# Patient Record
Sex: MALE | Race: BLACK OR AFRICAN AMERICAN | Employment: OTHER | URBAN - METROPOLITAN AREA
[De-identification: names, ages, dates, MRNs, and addresses within clinical notes are randomized per-mention and may not be internally consistent; named-entity substitution may affect disease eponyms.]

---

## 2017-01-20 NOTE — TELEPHONE ENCOUNTER
Requested Prescriptions     Pending Prescriptions Disp Refills    losartan (COZAAR) 100 mg tablet 30 Tab 5     Sig: Take 1 Tab by mouth daily.        Last office visit was  11/10/16  Next office visit is     2/9/17    Please assist.

## 2017-01-23 RX ORDER — LOSARTAN POTASSIUM 100 MG/1
100 TABLET ORAL DAILY
Qty: 30 TAB | Refills: 5 | Status: SHIPPED | OUTPATIENT
Start: 2017-01-23 | End: 2017-03-14 | Stop reason: SDUPTHER

## 2017-02-06 ENCOUNTER — HOSPITAL ENCOUNTER (OUTPATIENT)
Dept: LAB | Age: 72
Discharge: HOME | End: 2017-02-06
Payer: MEDICARE

## 2017-02-06 ENCOUNTER — LAB ONLY (OUTPATIENT)
Dept: INTERNAL MEDICINE CLINIC | Age: 72
End: 2017-02-06

## 2017-02-06 DIAGNOSIS — E11.65 TYPE 2 DIABETES MELLITUS WITH HYPERGLYCEMIA, WITHOUT LONG-TERM CURRENT USE OF INSULIN (HCC): ICD-10-CM

## 2017-02-06 LAB — HBA1C MFR BLD: 6.6 % (ref 4.2–5.6)

## 2017-02-06 PROCEDURE — 83036 HEMOGLOBIN GLYCOSYLATED A1C: CPT | Performed by: HOSPITALIST

## 2017-02-06 PROCEDURE — 36415 COLL VENOUS BLD VENIPUNCTURE: CPT | Performed by: HOSPITALIST

## 2017-02-09 ENCOUNTER — OFFICE VISIT (OUTPATIENT)
Dept: INTERNAL MEDICINE CLINIC | Age: 72
End: 2017-02-09

## 2017-02-09 DIAGNOSIS — I10 ESSENTIAL HYPERTENSION: Primary | ICD-10-CM

## 2017-02-09 NOTE — PATIENT INSTRUCTIONS
Hemoglobin A1c: About This Test  What is it? Hemoglobin A1c is a blood test that checks your average blood sugar level over the past 2 to 3 months. This test also is called a glycohemoglobin test or an A1c test.  Why is this test done? The A1c test is done to check how well your diabetes has been controlled over the past 2 to 3 months. Your doctor can use this information to adjust your medicine and diabetes treatment, if needed. How can you prepare for the test?  You do not need to stop eating before you have an A1c test. This test can be done at any time during the day, even after a meal.  What happens during the test?  The health professional taking a sample of your blood will:  · Wrap an elastic band around your upper arm. This makes the veins below the band larger so it is easier to put a needle into the vein. · Clean the needle site with alcohol. · Put the needle into the vein. · Attach a tube to the needle to fill it with blood. · Remove the band from your arm when enough blood is collected. · Put a gauze pad or cotton ball over the needle site as the needle is removed. · Put pressure on the site and then put on a bandage. What else should you know about the test?  The test result is usually given as a percentage. The normal A1c is less than 5.7%. The A1c test result also can be used to find your estimated average glucose, or eAG. Your eAG and A1c show the same thing in two different ways. They both help you learn more about your average blood sugar range over the past 2 to 3 months. Where can you learn more? Go to http://david-kayla.info/. Enter U216 in the search box to learn more about \"Hemoglobin A1c: About This Test.\"  Current as of: May 23, 2016  Content Version: 11.1  © 4259-6501 [employer]. Care instructions adapted under license by [employer] (which disclaims liability or warranty for this information).  If you have questions about a medical condition or this instruction, always ask your healthcare professional. [place] [address] any warranty or liability for your use of this information.

## 2017-03-08 NOTE — TELEPHONE ENCOUNTER
Requested Prescriptions     Pending Prescriptions Disp Refills    atorvastatin (LIPITOR) 80 mg tablet       Si Tab.

## 2017-03-08 NOTE — TELEPHONE ENCOUNTER
Requested Prescriptions     Pending Prescriptions Disp Refills    atorvastatin (LIPITOR) 80 mg tablet       Si Tab.        Last office visit was  17  Next office visit is     3/14/17    Please assist.

## 2017-03-09 RX ORDER — ATORVASTATIN CALCIUM 80 MG/1
80 TABLET, FILM COATED ORAL DAILY
Qty: 30 TAB | Refills: 4 | Status: SHIPPED | OUTPATIENT
Start: 2017-03-09 | End: 2017-10-10 | Stop reason: ALTCHOICE

## 2017-03-14 ENCOUNTER — OFFICE VISIT (OUTPATIENT)
Dept: INTERNAL MEDICINE CLINIC | Age: 72
End: 2017-03-14

## 2017-03-14 VITALS
BODY MASS INDEX: 32.2 KG/M2 | HEART RATE: 97 BPM | DIASTOLIC BLOOD PRESSURE: 68 MMHG | SYSTOLIC BLOOD PRESSURE: 142 MMHG | RESPIRATION RATE: 19 BRPM | HEIGHT: 64 IN | TEMPERATURE: 98.6 F | OXYGEN SATURATION: 98 % | WEIGHT: 188.6 LBS

## 2017-03-14 DIAGNOSIS — I10 ESSENTIAL HYPERTENSION: Primary | ICD-10-CM

## 2017-03-14 DIAGNOSIS — E11.65 TYPE 2 DIABETES MELLITUS WITH HYPERGLYCEMIA, WITHOUT LONG-TERM CURRENT USE OF INSULIN (HCC): ICD-10-CM

## 2017-03-14 DIAGNOSIS — E55.9 VITAMIN D DEFICIENCY: ICD-10-CM

## 2017-03-14 DIAGNOSIS — E78.00 PURE HYPERCHOLESTEROLEMIA: ICD-10-CM

## 2017-03-14 DIAGNOSIS — E66.9 OBESITY (BMI 30.0-34.9): ICD-10-CM

## 2017-03-14 RX ORDER — LOSARTAN POTASSIUM 100 MG/1
100 TABLET ORAL
Status: CANCELLED | OUTPATIENT
Start: 2017-03-14

## 2017-03-14 RX ORDER — CLOPIDOGREL BISULFATE 75 MG/1
75 TABLET ORAL DAILY
Qty: 30 TAB | Refills: 4 | Status: SHIPPED | OUTPATIENT
Start: 2017-03-14 | End: 2017-10-10 | Stop reason: ALTCHOICE

## 2017-03-14 RX ORDER — LOSARTAN POTASSIUM 100 MG/1
100 TABLET ORAL DAILY
Qty: 30 TAB | Refills: 5 | Status: SHIPPED | OUTPATIENT
Start: 2017-03-14 | End: 2017-08-12

## 2017-03-14 NOTE — PATIENT INSTRUCTIONS
A Healthy Lifestyle: Care Instructions  Your Care Instructions  A healthy lifestyle can help you feel good, stay at a healthy weight, and have plenty of energy for both work and play. A healthy lifestyle is something you can share with your whole family. A healthy lifestyle also can lower your risk for serious health problems, such as high blood pressure, heart disease, and diabetes. You can follow a few steps listed below to improve your health and the health of your family. Follow-up care is a key part of your treatment and safety. Be sure to make and go to all appointments, and call your doctor if you are having problems. Its also a good idea to know your test results and keep a list of the medicines you take. How can you care for yourself at home? · Do not eat too much sugar, fat, or fast foods. You can still have dessert and treats now and then. The goal is moderation. · Start small to improve your eating habits. Pay attention to portion sizes, drink less juice and soda pop, and eat more fruits and vegetables. ¨ Eat a healthy amount of food. A 3-ounce serving of meat, for example, is about the size of a deck of cards. Fill the rest of your plate with vegetables and whole grains. ¨ Limit the amount of soda and sports drinks you have every day. Drink more water when you are thirsty. ¨ Eat at least 5 servings of fruits and vegetables every day. It may seem like a lot, but it is not hard to reach this goal. A serving or helping is 1 piece of fruit, 1 cup of vegetables, or 2 cups of leafy, raw vegetables. Have an apple or some carrot sticks as an afternoon snack instead of a candy bar. Try to have fruits and/or vegetables at every meal.  · Make exercise part of your daily routine. You may want to start with simple activities, such as walking, bicycling, or slow swimming. Try to be active 30 to 60 minutes every day. You do not need to do all 30 to 60 minutes all at once.  For example, you can exercise 3 times a day for 10 or 20 minutes. Moderate exercise is safe for most people, but it is always a good idea to talk to your doctor before starting an exercise program.  · Keep moving. Su [employer] the lawn, work in the garden, or [employer]. Take the stairs instead of the elevator at work. · If you smoke, quit. People who smoke have an increased risk for heart attack, stroke, cancer, and other lung illnesses. Quitting is hard, but there are ways to boost your chance of quitting tobacco for good. ¨ Use nicotine gum, patches, or lozenges. ¨ Ask your doctor about stop-smoking programs and medicines. ¨ Keep trying. In addition to reducing your risk of diseases in the future, you will notice some benefits soon after you stop using tobacco. If you have shortness of breath or asthma symptoms, they will likely get better within a few weeks after you quit. · Limit how much alcohol you drink. Moderate amounts of alcohol (up to 2 drinks a day for men, 1 drink a day for women) are okay. But drinking too much can lead to liver problems, high blood pressure, and other health problems. Family health  If you have a family, there are many things you can do together to improve your health. · Eat meals together as a family as often as possible. · Eat healthy foods. This includes fruits, vegetables, lean meats and dairy, and whole grains. · Include your family in your fitness plan. Most people think of activities such as jogging or tennis as the way to fitness, but there are many ways you and your family can be more active. Anything that makes you breathe hard and gets your heart pumping is exercise. Here are some tips:  ¨ Walk to do errands or to take your child to school or the bus. ¨ Go for a family bike ride after dinner instead of watching TV. Where can you learn more? Go to http://david-kayla.info/. Enter V033 in the search box to learn more about \"A Healthy Lifestyle: Care Instructions. \"  Current as of: July 26, 2016  Content Version: 11.1  © 0228-5398 [employer]. Care instructions adapted under license by [employer] (which disclaims liability or warranty for this information). If you have questions about a medical condition or this instruction, always ask your healthcare professional. [address] any warranty or liability for your use of this information. DASH Diet: Care Instructions  Your Care Instructions  The DASH diet is an eating plan that can help lower your blood pressure. DASH stands for Dietary Approaches to Stop Hypertension. Hypertension is high blood pressure. The DASH diet focuses on eating foods that are high in calcium, potassium, and magnesium. These nutrients can lower blood pressure. The foods that are highest in these nutrients are fruits, vegetables, low-fat dairy products, nuts, seeds, and legumes. But taking calcium, potassium, and magnesium supplements instead of eating foods that are high in those nutrients does not have the same effect. The DASH diet also includes whole grains, fish, and poultry. The DASH diet is one of several lifestyle changes your doctor may recommend to lower your high blood pressure. Your doctor may also want you to decrease the amount of sodium in your diet. Lowering sodium while following the DASH diet can lower blood pressure even further than just the DASH diet alone. Follow-up care is a key part of your treatment and safety. Be sure to make and go to all appointments, and call your doctor if you are having problems. It's also a good idea to know your test results and keep a list of the medicines you take. How can you care for yourself at home? Following the DASH diet  · Eat 4 to 5 servings of fruit each day. A serving is 1 medium-sized piece of fruit, ½ cup chopped or canned fruit, 1/4 cup dried fruit, or 4 ounces (½ cup) of fruit juice. Choose fruit more often than fruit juice.   · Eat 4 to 5 servings of vegetables each day. A serving is 1 cup of lettuce or raw leafy vegetables, ½ cup of chopped or cooked vegetables, or 4 ounces (½ cup) of vegetable juice. Choose vegetables more often than vegetable juice. · Get 2 to 3 servings of low-fat and fat-free dairy each day. A serving is 8 ounces of milk, 1 cup of yogurt, or 1 ½ ounces of cheese. · Eat 6 to 8 servings of grains each day. A serving is 1 slice of bread, 1 ounce of dry cereal, or ½ cup of cooked rice, pasta, or cooked cereal. Try to choose whole-grain products as much as possible. · Limit lean meat, poultry, and fish to 2 servings each day. A serving is 3 ounces, about the size of a deck of cards. · Eat 4 to 5 servings of nuts, seeds, and legumes (cooked dried beans, lentils, and split peas) each week. A serving is 1/3 cup of nuts, 2 tablespoons of seeds, or ½ cup of cooked beans or peas. · Limit fats and oils to 2 to 3 servings each day. A serving is 1 teaspoon of vegetable oil or 2 tablespoons of salad dressing. · Limit sweets and added sugars to 5 servings or less a week. A serving is 1 tablespoon jelly or jam, ½ cup sorbet, or 1 cup of lemonade. · Eat less than 2,300 milligrams (mg) of sodium a day. If you limit your sodium to 1,500 mg a day, you can lower your blood pressure even more. Tips for success  · Start small. Do not try to make dramatic changes to your diet all at once. You might feel that you are missing out on your favorite foods and then be more likely to not follow the plan. Make small changes, and stick with them. Once those changes become habit, add a few more changes. · Try some of the following:  ¨ Make it a goal to eat a fruit or vegetable at every meal and at snacks. This will make it easy to get the recommended amount of fruits and vegetables each day. ¨ Try yogurt topped with fruit and nuts for a snack or healthy dessert. ¨ Add lettuce, tomato, cucumber, and onion to sandwiches.   ¨ Combine a ready-made pizza crust with low-fat mozzarella cheese and lots of vegetable toppings. Try using tomatoes, squash, spinach, broccoli, carrots, cauliflower, and onions. ¨ Have a variety of cut-up vegetables with a low-fat dip as an appetizer instead of chips and dip. ¨ Sprinkle sunflower seeds or chopped almonds over salads. Or try adding chopped walnuts or almonds to cooked vegetables. ¨ Try some vegetarian meals using beans and peas. Add garbanzo or kidney beans to salads. Make burritos and tacos with mashed barboza beans or black beans. Where can you learn more? Go to http://davidZi Uniform Supplykayla.info/. Enter E473 in the search box to learn more about \"DASH Diet: Care Instructions. \"  Current as of: March 23, 2016  Content Version: 11.1  © 7484-8416 [employer]. Care instructions adapted under license by [employer] (which disclaims liability or warranty for this information). If you have questions about a medical condition or this instruction, always ask your healthcare professional. [place] [address] any warranty or liability for your use of this information. Learning About Diabetes Food Guidelines  Your Care Instructions  Meal planning is important to manage diabetes. It helps keep your blood sugar at a target level (which you set with your doctor). You don't have to eat special foods. You can eat what your family eats, including sweets once in a while. But you do have to pay attention to how often you eat and how much you eat of certain foods. You may want to work with a dietitian or a certified diabetes educator (CDE) to help you plan meals and snacks. A dietitian or CDE can also help you lose weight if that is one of your goals. What should you know about eating carbs? Managing the amount of carbohydrate (carbs) you eat is an important part of healthy meals when you have diabetes. Carbohydrate is found in many foods. · Learn which foods have carbs.  And learn the amounts of carbs in different foods. ¨ Bread, cereal, pasta, and rice have about 15 grams of carbs in a serving. A serving is 1 slice of bread (1 ounce), ½ cup of cooked cereal, or 1/3 cup of cooked pasta or rice. ¨ Fruits have 15 grams of carbs in a serving. A serving is 1 small fresh fruit, such as an apple or orange; ½ of a banana; ½ cup of cooked or canned fruit; ½ cup of fruit juice; 1 cup of melon or raspberries; or 2 tablespoons of dried fruit. ¨ Milk and no-sugar-added yogurt have 15 grams of carbs in a serving. A serving is 1 cup of milk or 2/3 cup of no-sugar-added yogurt. ¨ Starchy vegetables have 15 grams of carbs in a serving. A serving is ½ cup of mashed potatoes or sweet potato; 1 cup winter squash; ½ of a small baked potato; ½ cup of cooked beans; or ½ cup cooked corn or green peas. · Learn how much carbs to eat each day and at each meal. A dietitian or CDE can teach you how to keep track of the amount of carbs you eat. This is called carbohydrate counting. · If you are not sure how to count carbohydrate grams, use the Plate Method to plan meals. It is a good, quick way to make sure that you have a balanced meal. It also helps you spread carbs throughout the day. ¨ Divide your plate by types of foods. Put non-starchy vegetables on half the plate, meat or other protein food on one-quarter of the plate, and a grain or starchy vegetable in the final quarter of the plate. To this you can add a small piece of fruit and 1 cup of milk or yogurt, depending on how many carbs you are supposed to eat at a meal.  · Try to eat about the same amount of carbs at each meal. Do not \"save up\" your daily allowance of carbs to eat at one meal.  · Proteins have very little or no carbs per serving. Examples of proteins are beef, chicken, turkey, fish, eggs, tofu, cheese, cottage cheese, and peanut butter. A serving size of meat is 3 ounces, which is about the size of a deck of cards.  Examples of meat substitute serving sizes (equal to 1 ounce of meat) are 1/4 cup of cottage cheese, 1 egg, 1 tablespoon of peanut butter, and ½ cup of tofu. How can you eat out and still eat healthy? · Learn to estimate the serving sizes of foods that have carbohydrate. If you measure food at home, it will be easier to estimate the amount in a serving of restaurant food. · If the meal you order has too much carbohydrate (such as potatoes, corn, or baked beans), ask to have a low-carbohydrate food instead. Ask for a salad or green vegetables. · If you use insulin, check your blood sugar before and after eating out to help you plan how much to eat in the future. · If you eat more carbohydrate at a meal than you had planned, take a walk or do other exercise. This will help lower your blood sugar. What else should you know? · Limit saturated fat, such as the fat from meat and dairy products. This is a healthy choice because people who have diabetes are at higher risk of heart disease. So choose lean cuts of meat and nonfat or low-fat dairy products. Use olive or canola oil instead of butter or shortening when cooking. · Don't skip meals. Your blood sugar may drop too low if you skip meals and take insulin or certain medicines for diabetes. · Check with your doctor before you drink alcohol. Alcohol can cause your blood sugar to drop too low. Alcohol can also cause a bad reaction if you take certain diabetes medicines. Follow-up care is a key part of your treatment and safety. Be sure to make and go to all appointments, and call your doctor if you are having problems. It's also a good idea to know your test results and keep a list of the medicines you take. Where can you learn more? Go to http://david-kayla.info/. Enter R438 in the search box to learn more about \"Learning About Diabetes Food Guidelines. \"  Current as of: May 23, 2016  Content Version: 11.1  © 3434-6026 [employer].  Care instructions adapted under license by [address] (which disclaims liability or warranty for this information). If you have questions about a medical condition or this instruction, always ask your healthcare professional. [address] any warranty or liability for your use of this information. Starting a Weight Loss Plan: Care Instructions  Your Care Instructions  If you are thinking about losing weight, it can be hard to know where to start. Your doctor can help you set up a weight loss plan that best meets your needs. You may want to take a class on nutrition or exercise, or join a weight loss support group. If you have questions about how to make changes to your eating or exercise habits, ask your doctor about seeing a registered dietitian or an exercise specialist.  It can be a big challenge to lose weight. But you do not have to make huge changes at once. Make small changes, and stick with them. When those changes become habit, add a few more changes. If you do not think you are ready to make changes right now, try to pick a date in the future. Make an appointment to see your doctor to discuss whether the time is right for you to start a plan. Follow-up care is a key part of your treatment and safety. Be sure to make and go to all appointments, and call your doctor if you are having problems. Its also a good idea to know your test results and keep a list of the medicines you take. How can you care for yourself at home? · Set realistic goals. Many people expect to lose much more weight than is likely. A weight loss of 5% to 10% of your body weight may be enough to improve your health. · Get family and friends involved to provide support. Talk to them about why you are trying to lose weight, and ask them to help. They can help by participating in exercise and having meals with you, even if they may be eating something different. · Find what works best for you.  If you do not have time or do not like to cook, a program that offers meal replacement bars or shakes may be better for you. Or if you like to prepare meals, finding a plan that includes daily menus and recipes may be best.  · Ask your doctor about other health professionals who can help you achieve your weight loss goals. ¨ A dietitian can help you make healthy changes in your diet. ¨ An exercise specialist or  can help you develop a safe and effective exercise program.  ¨ A counselor or psychiatrist can help you cope with issues such as depression, anxiety, or family problems that can make it hard to focus on weight loss. · Consider joining a support group for people who are trying to lose weight. Your doctor can suggest groups in your area. Where can you learn more? Go to http://david-kayla.info/. Enter T723 in the search box to learn more about \"Starting a Weight Loss Plan: Care Instructions. \"  Current as of: February 16, 2016  Content Version: 11.1  © 2510-4872 [employer]. Care instructions adapted under license by [employer] (which disclaims liability or warranty for this information). If you have questions about a medical condition or this instruction, always ask your healthcare professional. [address] any warranty or liability for your use of this information.

## 2017-03-14 NOTE — PROGRESS NOTES
Chief Complaint   Patient presents with    Hypertension    Diabetes    Medication Refill     1. Have you been to the ER, urgent care clinic since your last visit? Hospitalized since your last visit? No    2. Have you seen or consulted any other health care providers outside of the [address] [place] [address] since your last visit? Include any pap smears or colon screening.  No

## 2017-03-14 NOTE — MR AVS SNAPSHOT
Visit Information Date & Time Provider Department Dept. Phone Encounter #  
 3/14/2017  1:30 PM Flip Telles DO [facility] [employer] [phone] Follow-up Instructions Return in about 3 months (around 6/14/2017) for Labs 1 week before. Your Appointments 3/21/2017 10:00 AM  
PHYSICAL THERAPY with Bridget Roman PTA [facility]. [address]) Appt Note: MCR,  [employer]  
 301 [facility] [address] [facility] [address]  
  
   
 [place] [address] [id number]  
  
    
 3/29/2017  9:00 AM  
INJECTION with [facility] NURSE [facility]. [address] ([address]) Appt Note: 1st ici dosing/already paid $35.00 on 2/1/2017  
 301 [facility] [address] [facility] [address]  
  
   
 [place] [address] [id number]  
  
    
 4/6/2017  1:30 PM  
ESTABLISHED PATIENT with Rodolfo Madison MD  
[facility] [address]) Appt Note: F/U 3 mos with PSA. 301 [facility], [address] [facility] [address]  
[phone]  
  
   
 301 [facility], [address]  
  
    
 4/12/2017  2:15 PM  
ESTABLISHED PATIENT with Eli Villa MD  
[facility]. [address] ([address]) Appt Note: 3 mth follow up ED  
 [address] [address]  
[phone]  
  
   
 [facility] [phone] Upcoming Health Maintenance Date Due DTaP/Tdap/Td series (1 - Tdap) 4/23/1968 ZOSTER VACCINE AGE 60> 4/23/2007 GLAUCOMA SCREENING Q2Y 4/23/2012 MEDICARE YEARLY EXAM 4/23/2012 FOOT EXAM Q1 2/3/2015 EYE EXAM RETINAL OR DILATED Q1 3/3/2015 HEMOGLOBIN A1C Q6M 8/6/2017 MICROALBUMIN Q1 11/2/2017 LIPID PANEL Q1 11/2/2017 COLONOSCOPY 11/21/2021 Allergies as of 3/14/2017  Review Complete On: 3/14/2017 By: Flip Telles DO  
 No Known Allergies Current Immunizations  Reviewed on 7/12/2012 Name Date Pneumococcal Vaccine (Unspecified Type) 7/12/2012 Not reviewed this visit You Were Diagnosed With   
  
 Codes Comments Essential hypertension    -  Primary ICD-10-CM: I10 
ICD-9-CM: 401.9 Pure hypercholesterolemia     ICD-10-CM: E78.00 ICD-9-CM: 272.0 Type 2 diabetes mellitus with hyperglycemia, without long-term current use of insulin (HCC)     ICD-10-CM: E11.65 ICD-9-CM: 250.00, 790.29 Vitamin D deficiency     ICD-10-CM: E55.9 ICD-9-CM: 268.9 Obesity (BMI 30.0-34.9)     ICD-10-CM: R46.8 ICD-9-CM: 278.00 Vitals BP Pulse Temp Resp Height(growth percentile) Weight(growth percentile) 142/68 (BP 1 Location: Left arm, BP Patient Position: Sitting) 97 98.6 °F (37 °C) (Oral) 19 5' 4\" (1.626 m) 188 lb 9.6 oz (85.5 kg) SpO2 BMI Smoking Status 98% 32.37 kg/m2 Never Smoker Vitals History BMI and BSA Data Body Mass Index Body Surface Area  
 32.37 kg/m 2 1.96 m 2 Preferred Pharmacy Pharmacy Name Phone [facility] [address] [phone] Your Updated Medication List  
  
   
This list is accurate as of: 3/14/17  2:06 PM.  Always use your most recent med list.  
  
  
  
  
 AGGRENOX  mg per SR capsule Generic drug:  aspirin-dipyridamole Take 1 Cap by Mouth Twice Daily. atorvastatin 80 mg tablet Commonly known as:  LIPITOR Take 1 Tab by mouth daily. clopidogrel 75 mg Tab Commonly known as:  PLAVIX Take 1 Tab by mouth daily. FISH OIL 1,000 mg Cap Generic drug:  omega-3 fatty acids-vitamin e Take 1 Cap by mouth. hydroCHLOROthiazide 25 mg tablet Commonly known as:  HYDRODIURIL  
25 mg.  
  
 * losartan 100 mg tablet Commonly known as:  COZAAR  
100 mg.  
  
 * losartan 100 mg tablet Commonly known as:  COZAAR Take 1 Tab by mouth daily. * metFORMIN 500 mg tablet Commonly known as:  GLUCOPHAGE  
500 mg.  
  
 * metFORMIN 500 mg tablet Commonly known as:  GLUCOPHAGE Take 1 Tab by mouth two (2) times daily (with meals). oxybutynin 5 mg tablet Commonly known as:  DITROPAN  
5 mg.  
  
 tadalafil 5 mg tablet Commonly known as:  CIALIS Take 1 Tab by mouth daily. * FLOMAX 0.4 mg capsule Generic drug:  tamsulosin 0.4 mg.  
  
 * tamsulosin 0.4 mg capsule Commonly known as:  FLOMAX Take 2 Caps by mouth daily (after dinner). terazosin 2 mg capsule Commonly known as:  HYTRIN  
2 mg. VITAMIN D3 1,000 unit Cap Generic drug:  cholecalciferol Take  by mouth daily. * Notice: This list has 6 medication(s) that are the same as other medications prescribed for you. Read the directions carefully, and ask your doctor or other care provider to review them with you. Prescriptions Sent to Pharmacy Refills  
 clopidogrel (PLAVIX) 75 mg tab 4 Sig: Take 1 Tab by mouth daily. Class: Normal  
 Pharmacy: [place] [address] [address], [facility] [address] Ph #: [phone] Route: Oral  
 losartan (COZAAR) 100 mg tablet 5 Sig: Take 1 Tab by mouth daily. Class: Normal  
 Pharmacy: [place] [address] [address], [facility] [address] Ph #: [phone] Route: Oral  
  
Follow-up Instructions Return in about 3 months (around 6/14/2017) for Labs 1 week before. To-Do List   
 06/12/2017 Lab:  CBC WITH AUTOMATED DIFF   
  
 06/12/2017 Lab:  HEMOGLOBIN A1C W/O EAG   
  
 06/12/2017 Lab:  LIPID PANEL   
  
 06/12/2017 Lab:  METABOLIC PANEL, COMPREHENSIVE   
  
 06/12/2017 Lab:  T4, FREE   
  
 06/12/2017 Lab:  TSH 3RD GENERATION   
  
 06/12/2017 Lab:  VITAMIN D, 25 HYDROXY Patient Instructions A Healthy Lifestyle: Care Instructions Your Care Instructions A healthy lifestyle can help you feel good, stay at a healthy weight, and have plenty of energy for both work and play. A healthy lifestyle is something you can share with your whole family. A healthy lifestyle also can lower your risk for serious health problems, such as high blood pressure, heart disease, and diabetes. You can follow a few steps listed below to improve your health and the health of your family. Follow-up care is a key part of your treatment and safety. Be sure to make and go to all appointments, and call your doctor if you are having problems. Its also a good idea to know your test results and keep a list of the medicines you take. How can you care for yourself at home? · Do not eat too much sugar, fat, or fast foods. You can still have dessert and treats now and then. The goal is moderation. · Start small to improve your eating habits. Pay attention to portion sizes, drink less juice and soda pop, and eat more fruits and vegetables. ¨ Eat a healthy amount of food. A 3-ounce serving of meat, for example, is about the size of a deck of cards. Fill the rest of your plate with vegetables and whole grains. ¨ Limit the amount of soda and sports drinks you have every day. Drink more water when you are thirsty. ¨ Eat at least 5 servings of fruits and vegetables every day. It may seem like a lot, but it is not hard to reach this goal. A serving or helping is 1 piece of fruit, 1 cup of vegetables, or 2 cups of leafy, raw vegetables. Have an apple or some carrot sticks as an afternoon snack instead of a candy bar. Try to have fruits and/or vegetables at every meal. 
· Make exercise part of your daily routine. You may want to start with simple activities, such as walking, bicycling, or slow swimming. Try to be active 30 to 60 minutes every day. You do not need to do all 30 to 60 minutes all at once.  For example, you can exercise 3 times a day for 10 or 20 minutes. Moderate exercise is safe for most people, but it is always a good idea to talk to your doctor before starting an exercise program. 
· Keep moving. [place] Bun the lawn, work in the garden, or [employer]. Take the stairs instead of the elevator at work. · If you smoke, quit. People who smoke have an increased risk for heart attack, stroke, cancer, and other lung illnesses. Quitting is hard, but there are ways to boost your chance of quitting tobacco for good. ¨ Use nicotine gum, patches, or lozenges. ¨ Ask your doctor about stop-smoking programs and medicines. ¨ Keep trying. In addition to reducing your risk of diseases in the future, you will notice some benefits soon after you stop using tobacco. If you have shortness of breath or asthma symptoms, they will likely get better within a few weeks after you quit. · Limit how much alcohol you drink. Moderate amounts of alcohol (up to 2 drinks a day for men, 1 drink a day for women) are okay. But drinking too much can lead to liver problems, high blood pressure, and other health problems. Family health If you have a family, there are many things you can do together to improve your health. · Eat meals together as a family as often as possible. · Eat healthy foods. This includes fruits, vegetables, lean meats and dairy, and whole grains. · Include your family in your fitness plan. Most people think of activities such as jogging or tennis as the way to fitness, but there are many ways you and your family can be more active. Anything that makes you breathe hard and gets your heart pumping is exercise. Here are some tips: 
¨ Walk to do errands or to take your child to school or the bus. ¨ Go for a family bike ride after dinner instead of watching TV. Where can you learn more? Go to http://david-kayla.info/. Enter G688 in the search box to learn more about \"A Healthy Lifestyle: Care Instructions. \" Current as of: July 26, 2016 Content Version: 11.1 © 5907-3462 [employer]. Care instructions adapted under license by [employer] (which disclaims liability or warranty for this information). If you have questions about a medical condition or this instruction, always ask your healthcare professional. [address] any warranty or liability for your use of this information. DASH Diet: Care Instructions Your Care Instructions The DASH diet is an eating plan that can help lower your blood pressure. DASH stands for Dietary Approaches to Stop Hypertension. Hypertension is high blood pressure. The DASH diet focuses on eating foods that are high in calcium, potassium, and magnesium. These nutrients can lower blood pressure. The foods that are highest in these nutrients are fruits, vegetables, low-fat dairy products, nuts, seeds, and legumes. But taking calcium, potassium, and magnesium supplements instead of eating foods that are high in those nutrients does not have the same effect. The DASH diet also includes whole grains, fish, and poultry. The DASH diet is one of several lifestyle changes your doctor may recommend to lower your high blood pressure. Your doctor may also want you to decrease the amount of sodium in your diet. Lowering sodium while following the DASH diet can lower blood pressure even further than just the DASH diet alone. Follow-up care is a key part of your treatment and safety. Be sure to make and go to all appointments, and call your doctor if you are having problems. It's also a good idea to know your test results and keep a list of the medicines you take. How can you care for yourself at home? Following the DASH diet · Eat 4 to 5 servings of fruit each day. A serving is 1 medium-sized piece of fruit, ½ cup chopped or canned fruit, 1/4 cup dried fruit, or 4 ounces (½ cup) of fruit juice. Choose fruit more often than fruit juice. · Eat 4 to 5 servings of vegetables each day. A serving is 1 cup of lettuce or raw leafy vegetables, ½ cup of chopped or cooked vegetables, or 4 ounces (½ cup) of vegetable juice. Choose vegetables more often than vegetable juice. · Get 2 to 3 servings of low-fat and fat-free dairy each day. A serving is 8 ounces of milk, 1 cup of yogurt, or 1 ½ ounces of cheese. · Eat 6 to 8 servings of grains each day. A serving is 1 slice of bread, 1 ounce of dry cereal, or ½ cup of cooked rice, pasta, or cooked cereal. Try to choose whole-grain products as much as possible. · Limit lean meat, poultry, and fish to 2 servings each day. A serving is 3 ounces, about the size of a deck of cards. · Eat 4 to 5 servings of nuts, seeds, and legumes (cooked dried beans, lentils, and split peas) each week. A serving is 1/3 cup of nuts, 2 tablespoons of seeds, or ½ cup of cooked beans or peas. · Limit fats and oils to 2 to 3 servings each day. A serving is 1 teaspoon of vegetable oil or 2 tablespoons of salad dressing. · Limit sweets and added sugars to 5 servings or less a week. A serving is 1 tablespoon jelly or jam, ½ cup sorbet, or 1 cup of lemonade. · Eat less than 2,300 milligrams (mg) of sodium a day. If you limit your sodium to 1,500 mg a day, you can lower your blood pressure even more. Tips for success · Start small. Do not try to make dramatic changes to your diet all at once. You might feel that you are missing out on your favorite foods and then be more likely to not follow the plan. Make small changes, and stick with them. Once those changes become habit, add a few more changes. · Try some of the following: ¨ Make it a goal to eat a fruit or vegetable at every meal and at snacks. This will make it easy to get the recommended amount of fruits and vegetables each day. ¨ Try yogurt topped with fruit and nuts for a snack or healthy dessert. ¨ Add lettuce, tomato, cucumber, and onion to sandwiches. ¨ Combine a ready-made pizza crust with low-fat mozzarella cheese and lots of vegetable toppings. Try using tomatoes, squash, spinach, broccoli, carrots, cauliflower, and onions. ¨ Have a variety of cut-up vegetables with a low-fat dip as an appetizer instead of chips and dip. ¨ Sprinkle sunflower seeds or chopped almonds over salads. Or try adding chopped walnuts or almonds to cooked vegetables. ¨ Try some vegetarian meals using beans and peas. Add garbanzo or kidney beans to salads. Make burritos and tacos with mashed barboza beans or black beans. Where can you learn more? Go to http://davidEnjoikayla.info/. Enter M724 in the search box to learn more about \"DASH Diet: Care Instructions. \" Current as of: March 23, 2016 Content Version: 11.1 © 3545-2783 [employer]entials. Care instructions adapted under license by [employer] (which disclaims liability or warranty for this information). If you have questions about a medical condition or this instruction, always ask your healthcare professional. [address] any warranty or liability for your use of this information. Learning About Diabetes Food Guidelines Your Care Instructions Meal planning is important to manage diabetes. It helps keep your blood sugar at a target level (which you set with your doctor). You don't have to eat special foods. You can eat what your family eats, including sweets once in a while. But you do have to pay attention to how often you eat and how much you eat of certain foods. You may want to work with a dietitian or a certified diabetes educator (CDE) to help you plan meals and snacks. A dietitian or CDE can also help you lose weight if that is one of your goals. What should you know about eating carbs? Managing the amount of carbohydrate (carbs) you eat is an important part of healthy meals when you have diabetes. Carbohydrate is found in many foods. · Learn which foods have carbs. And learn the amounts of carbs in different foods. ¨ Bread, cereal, pasta, and rice have about 15 grams of carbs in a serving. A serving is 1 slice of bread (1 ounce), ½ cup of cooked cereal, or 1/3 cup of cooked pasta or rice. ¨ Fruits have 15 grams of carbs in a serving. A serving is 1 small fresh fruit, such as an apple or orange; ½ of a banana; ½ cup of cooked or canned fruit; ½ cup of fruit juice; 1 cup of melon or raspberries; or 2 tablespoons of dried fruit. ¨ Milk and no-sugar-added yogurt have 15 grams of carbs in a serving. A serving is 1 cup of milk or 2/3 cup of no-sugar-added yogurt. ¨ Starchy vegetables have 15 grams of carbs in a serving. A serving is ½ cup of mashed potatoes or sweet potato; 1 cup winter squash; ½ of a small baked potato; ½ cup of cooked beans; or ½ cup cooked corn or green peas. · Learn how much carbs to eat each day and at each meal. A dietitian or CDE can teach you how to keep track of the amount of carbs you eat. This is called carbohydrate counting. · If you are not sure how to count carbohydrate grams, use the Plate Method to plan meals. It is a good, quick way to make sure that you have a balanced meal. It also helps you spread carbs throughout the day. ¨ Divide your plate by types of foods. Put non-starchy vegetables on half the plate, meat or other protein food on one-quarter of the plate, and a grain or starchy vegetable in the final quarter of the plate. To this you can add a small piece of fruit and 1 cup of milk or yogurt, depending on how many carbs you are supposed to eat at a meal. 
· Try to eat about the same amount of carbs at each meal. Do not \"save up\" your daily allowance of carbs to eat at one meal. 
· Proteins have very little or no carbs per serving. Examples of proteins are beef, chicken, turkey, fish, eggs, tofu, cheese, cottage cheese, and peanut butter.  A serving size of meat is 3 ounces, which is about the size of a deck of cards. Examples of meat substitute serving sizes (equal to 1 ounce of meat) are 1/4 cup of cottage cheese, 1 egg, 1 tablespoon of peanut butter, and ½ cup of tofu. How can you eat out and still eat healthy? · Learn to estimate the serving sizes of foods that have carbohydrate. If you measure food at home, it will be easier to estimate the amount in a serving of restaurant food. · If the meal you order has too much carbohydrate (such as potatoes, corn, or baked beans), ask to have a low-carbohydrate food instead. Ask for a salad or green vegetables. · If you use insulin, check your blood sugar before and after eating out to help you plan how much to eat in the future. · If you eat more carbohydrate at a meal than you had planned, take a walk or do other exercise. This will help lower your blood sugar. What else should you know? · Limit saturated fat, such as the fat from meat and dairy products. This is a healthy choice because people who have diabetes are at higher risk of heart disease. So choose lean cuts of meat and nonfat or low-fat dairy products. Use olive or canola oil instead of butter or shortening when cooking. · Don't skip meals. Your blood sugar may drop too low if you skip meals and take insulin or certain medicines for diabetes. · Check with your doctor before you drink alcohol. Alcohol can cause your blood sugar to drop too low. Alcohol can also cause a bad reaction if you take certain diabetes medicines. Follow-up care is a key part of your treatment and safety. Be sure to make and go to all appointments, and call your doctor if you are having problems. It's also a good idea to know your test results and keep a list of the medicines you take. Where can you learn more? Go to http://david-kayla.info/. Enter N779 in the search box to learn more about \"Learning About Diabetes Food Guidelines. \" Current as of: May 23, 2016 Content Version: 11.1 © 7698-2093 Healthwise, Incorporated. Care instructions adapted under license by [employer] (which disclaims liability or warranty for this information). If you have questions about a medical condition or this instruction, always ask your healthcare professional. [address] any warranty or liability for your use of this information. Starting a Weight Loss Plan: Care Instructions Your Care Instructions If you are thinking about losing weight, it can be hard to know where to start. Your doctor can help you set up a weight loss plan that best meets your needs. You may want to take a class on nutrition or exercise, or join a weight loss support group. If you have questions about how to make changes to your eating or exercise habits, ask your doctor about seeing a registered dietitian or an exercise specialist. 
It can be a big challenge to lose weight. But you do not have to make huge changes at once. Make small changes, and stick with them. When those changes become habit, add a few more changes. If you do not think you are ready to make changes right now, try to pick a date in the future. Make an appointment to see your doctor to discuss whether the time is right for you to start a plan. Follow-up care is a key part of your treatment and safety. Be sure to make and go to all appointments, and call your doctor if you are having problems. Its also a good idea to know your test results and keep a list of the medicines you take. How can you care for yourself at home? · Set realistic goals. Many people expect to lose much more weight than is likely. A weight loss of 5% to 10% of your body weight may be enough to improve your health. · Get family and friends involved to provide support. Talk to them about why you are trying to lose weight, and ask them to help.  They can help by participating in exercise and having meals with you, even if they may be eating something different. · Find what works best for you. If you do not have time or do not like to cook, a program that offers meal replacement bars or shakes may be better for you. Or if you like to prepare meals, finding a plan that includes daily menus and recipes may be best. 
· Ask your doctor about other health professionals who can help you achieve your weight loss goals. ¨ A dietitian can help you make healthy changes in your diet. ¨ An exercise specialist or  can help you develop a safe and effective exercise program. 
¨ A counselor or psychiatrist can help you cope with issues such as depression, anxiety, or family problems that can make it hard to focus on weight loss. · Consider joining a support group for people who are trying to lose weight. Your doctor can suggest groups in your area. Where can you learn more? Go to http://david-kayla.info/. Enter Y181 in the search box to learn more about \"Starting a Weight Loss Plan: Care Instructions. \" Current as of: February 16, 2016 Content Version: 11.1 © 3903-0293 [employer]. Care instructions adapted under license by [employer] (which disclaims liability or warranty for this information). If you have questions about a medical condition or this instruction, always ask your healthcare professional. [address] any warranty or liability for your use of this information. Introducing [facility]! Yelena Kenney introduces LectureTools patient portal. Now you can access parts of your medical record, email your doctor's office, and request medication refills online. 1. In your internet browser, go to [email or web address] 2. Click on the First Time User? Click Here link in the Sign In box. You will see the New Member Sign Up page. 3. Enter your LectureTools Access Code exactly as it appears below.  You will not need to use this code after youve completed the sign-up process. If you do not sign up before the expiration date, you must request a new code. · OkCupid Access Code: [email or web address] Expires: 5/2/2017 10:24 AM 
 
4. Enter the last four digits of your Social Security Number (xxxx) and Date of Birth (mm/dd/yyyy) as indicated and click Submit. You will be taken to the next sign-up page. 5. Create a OkCupid ID. This will be your OkCupid login ID and cannot be changed, so think of one that is secure and easy to remember. 6. Create a OkCupid password. You can change your password at any time. 7. Enter your Password Reset Question and Answer. This can be used at a later time if you forget your password. 8. Enter your e-mail address. You will receive e-mail notification when new information is available in [address]. 9. Click Sign Up. You can now view and download portions of your medical record. 10. Click the Download Summary menu link to download a portable copy of your medical information. If you have questions, please visit the Frequently Asked Questions section of the OkCupid website. Remember, OkCupid is NOT to be used for urgent needs. For medical emergencies, dial 911. Now available from your iPhone and Android! Please provide this summary of care documentation to your next provider. Your primary care clinician is listed as Zoraida [place]. If you have any questions after today's visit, please call [phone].

## 2017-03-14 NOTE — PROGRESS NOTES
Chief Complaint   Patient presents with    Hypertension    Diabetes    Medication Refill       HPI:  Patient is a 73year old obese  male with medical problems listed below presents today for follow up of Hypertension, DM 2, Hyperlipidemia, Vit D def, etc. He has been feeling well and voices no complaints today. He is complaint with his medications with no adverse effects reported and his weight has been stable. Past Medical History:   Diagnosis Date    BPH (benign prostatic hypertrophy) with urinary obstruction     CAD (coronary artery disease) 6-21-02 8-5-06 Thallium ST: +inferolat ischemic    CVA 3-17-06    acute CVA, L Eual w/ mild R sided weakness    Diabetes ([facility] [address] [facility]     [facility] 7/12/2012    ED (erectile dysfunction) 12-22-08    Elevated PSA 11/21/2011    Erectile dysfunction due to arterial insufficiency     Erectile dysfunction of organic origin     Essential hypertension     Heartburn 12-4-03    Hemorrhoids     Hypercholesterolemia 7-15-08    TSH 0.88    Hyperglycemia     Hyperlipidemia     Hypertension 6-22-01    MILD    Obstructive sleep apnea (adult) (pediatric) 2-2009    Prediabetes 8/18/2010    Prostate cancer ([facility] 03.25.2016    Darvin 7 in 1/12 cores and Darvin 6 in 2/12 cores. PSA 6.0ng/mL. TRUS Volume 143 grams    Psoriasis     Rising PSA level     Rotator cuff tear, right     [facility] Shen [facility]    Seborrheic dermatitis     Shoulder pain, right 1/27/2012    Stroke [facility])     Vitamin D deficiency 11/25/2011     No Known Allergies    Current Outpatient Prescriptions   Medication Sig Dispense Refill    atorvastatin (LIPITOR) 80 mg tablet Take 1 Tab by mouth daily. 30 Tab 4    losartan (COZAAR) 100 mg tablet Take 1 Tab by mouth daily. 30 Tab 5    aspirin-dipyridamole (AGGRENOX)  mg per SR capsule Take 1 Cap by Mouth Twice Daily.  oxybutynin (DITROPAN) 5 mg tablet 5 mg.       hydroCHLOROthiazide (HYDRODIURIL) 25 mg tablet 25 mg.      metFORMIN (GLUCOPHAGE) 500 mg tablet 500 mg.      losartan (COZAAR) 100 mg tablet 100 mg.  clopidogrel (PLAVIX) 75 mg tab 75 mg.  tamsulosin (FLOMAX) 0.4 mg capsule 0.4 mg.      terazosin (HYTRIN) 2 mg capsule 2 mg.  tadalafil (CIALIS) 5 mg tablet Take 1 Tab by mouth daily. 30 Tab 3    cholecalciferol (VITAMIN D3) 1,000 unit cap Take  by mouth daily.  tamsulosin (FLOMAX) 0.4 mg capsule Take 2 Caps by mouth daily (after dinner). 180 Cap 3    metFORMIN (GLUCOPHAGE) 500 mg tablet Take 1 Tab by mouth two (2) times daily (with meals). 60 Tab 4    omega-3 fatty acids-vitamin e (FISH OIL) 1,000 mg cap Take 1 Cap by mouth. Past Surgical History:   Procedure Laterality Date    HX KNEE ARTHROSCOPY      HX PROSTATECTOMY  11/16/2016    Robotic-assisted laparoscopic radical prostatectomy. Robotic-assisted laparoscopic bilateral pelvic lymph node dissection  .Dissection was very difficult, bladder neck repair/anastamosis complex and time for this was >50% of time ordinarily required for this procedure.     NE COLONOSCOPY FLX DX W/COLLJ SPEC WHEN PFRMD  4-30-01    papules/ Christiano    NE COLONOSCOPY FLX DX W/COLLJ SPEC WHEN PFRMD      normal . Dr. Beck       ROS:  Constitutional: Negative for fever, chills, or fatigue  Neurological: Negative for headache, dizziness, visual disturbance, or loss of conciousness  Respiratory: Negative for SOB, hemoptysis, or wheezing  Cardiovascular: Negative for chest pain, palpitation, or leg swelling  Gastrointestinal: Negative for abdominal pain, nausea, vomiting, diarrhea, blood in stool, melena, or heartburn  Musculoskeletal: Negative for falls      Physical Exam:  Visit Vitals    /68 (BP 1 Location: Left arm, BP Patient Position: Sitting)    Pulse 97    Temp 98.6 °F (37 °C) (Oral)    Resp 19    Ht 5' 4\" (1.626 m)    Wt 188 lb 9.6 oz (85.5 kg)    SpO2 98%    BMI 32.37 kg/m2 General: Obese black male, a & o x 3, afebrile, well-nourished, interacting appropriately, in no acute distress  Skin: warm and dry, no rashes , no bruises  Neck: supple, symmetrical, no thyromegaly  Respiratory: symmetrical chest expansion, lung sounds clear bilaterally, good air entry, good respiratory effort, no wheezes or crackles  Cardiovascular: normal S1S2, regular rate and rhythm, no murmurs, pulses palpable, no thrill, no carotid or abdominal bruits, no peripheral edema, no JVD  Abdomen: non-distended, normoactive bowel sounds x 4 quadrants, soft, non-tender to palpation  Musculoskeletal: normal ROM on all joints, no swelling or deformity, no perilumbar tenderness, steady gait  Diabetic foot exam: pedal pulses palpable and no callus noted          Assessment/Plan:    ICD-10-CM ICD-9-CM    1. Essential hypertension I10 401.9 Controlled  Continue current meds and he was counseled on low salt diet. losartan (COZAAR) 100 mg tablet      CBC WITH AUTOMATED DIFF      METABOLIC PANEL, COMPREHENSIVE      T4, FREE      TSH 3RD GENERATION   2. Pure hypercholesterolemia E78.00 272.0 Continue Lipitor 80 mg daily and he was counseled on low fat diet - will check lipid panel at f/u in 3 months. LIPID PANEL   3. Type 2 diabetes mellitus with hyperglycemia, without long-term current use of insulin (HCC) E11.65 250.00 Controlled with recent HBA1C at 6.6. Continue current meds and he was counseled on diabetic diet. HEMOGLOBIN A1C W/O EAG     790.29    4. Vitamin D deficiency E55.9 268.9 VITAMIN D, 25 HYDROXY   5. Obesity (BMI 30.0-34. 9) E66.9 278.00 I have reviewed/discussed the above normal BMI with the patient. I have recommended the following interventions: dietary management education, guidance, and counseling, encourage exercise and lifestyle education regarding diet . The plan is as follows: I have counseled this patient on diet and exercise regimens.  .           Orders Placed This Encounter    CBC WITH AUTOMATED DIFF     Standing Status:   Future     Standing Expiration Date:   9/10/2017    HEMOGLOBIN A1C W/O EAG     Standing Status:   Future     Standing Expiration Date:   3/15/2018    LIPID PANEL     Standing Status:   Future     Standing Expiration Date:   1/08/9685    METABOLIC PANEL, COMPREHENSIVE     Standing Status:   Future     Standing Expiration Date:   9/10/2017    T4, FREE     Standing Status:   Future     Standing Expiration Date:   9/10/2017    TSH 3RD GENERATION     Standing Status:   Future     Standing Expiration Date:   7/12/2017    VITAMIN D, 25 HYDROXY     Standing Status:   Future     Standing Expiration Date:   7/14/2017    clopidogrel (PLAVIX) 75 mg tab     Sig: Take 1 Tab by mouth daily. Dispense:  30 Tab     Refill:  4    losartan (COZAAR) 100 mg tablet     Sig: Take 1 Tab by mouth daily. Dispense:  30 Tab     Refill:  5         Recent labs reviewed with pt        Additional Notes: Discussed today's diagnosis, treatment plans. Discussed medication indications and side effects. Weight Management: Counseled  After Visit Summary: Discussed provided printed patient instructions. Answered questions accordingly. Follow-up Disposition:  In 3 months with labs 1 week prior        Kristin Pa DO, MPH  Internal Medicine

## 2017-04-06 PROBLEM — N52.9 IMPOTENCE OF ORGANIC ORIGIN: Status: ACTIVE | Noted: 2017-04-06

## 2017-04-06 PROBLEM — N39.3 SUI (STRESS URINARY INCONTINENCE), MALE: Status: ACTIVE | Noted: 2017-04-06

## 2017-06-01 RX ORDER — HYDROCHLOROTHIAZIDE 25 MG/1
25 TABLET ORAL DAILY
Qty: 30 TAB | Refills: 3 | Status: SHIPPED | OUTPATIENT
Start: 2017-06-01 | End: 2017-08-12

## 2017-06-01 NOTE — TELEPHONE ENCOUNTER
I called Pt in regards to Rx refill for HCTZ. Informed Pt that Rx was refilled and sent to the pharmacy.

## 2017-06-01 NOTE — TELEPHONE ENCOUNTER
Requested Prescriptions     Pending Prescriptions Disp Refills    hydroCHLOROthiazide (HYDRODIURIL) 25 mg tablet       Si Tab.        Last office visit was  3/14/17  Next office visit is     17    Please assist.

## 2017-06-01 NOTE — TELEPHONE ENCOUNTER
Pt called in requesting refill of his   Requested Prescriptions     Pending Prescriptions Disp Refills    hydroCHLOROthiazide (HYDRODIURIL) 25 mg tablet       Si Tab. Jonas Ricks

## 2017-06-12 ENCOUNTER — OFFICE VISIT (OUTPATIENT)
Dept: INTERNAL MEDICINE CLINIC | Age: 72
End: 2017-06-12

## 2017-06-12 ENCOUNTER — HOSPITAL ENCOUNTER (OUTPATIENT)
Dept: LAB | Age: 72
Discharge: HOME | End: 2017-06-12
Payer: MEDICARE

## 2017-06-12 VITALS
BODY MASS INDEX: 32.33 KG/M2 | OXYGEN SATURATION: 98 % | SYSTOLIC BLOOD PRESSURE: 140 MMHG | RESPIRATION RATE: 16 BRPM | TEMPERATURE: 98.1 F | HEIGHT: 64 IN | HEART RATE: 69 BPM | DIASTOLIC BLOOD PRESSURE: 78 MMHG | WEIGHT: 189.4 LBS

## 2017-06-12 DIAGNOSIS — I10 ESSENTIAL HYPERTENSION: ICD-10-CM

## 2017-06-12 DIAGNOSIS — E66.9 OBESITY (BMI 30.0-34.9): ICD-10-CM

## 2017-06-12 DIAGNOSIS — E78.00 PURE HYPERCHOLESTEROLEMIA: ICD-10-CM

## 2017-06-12 DIAGNOSIS — E55.9 VITAMIN D DEFICIENCY: ICD-10-CM

## 2017-06-12 DIAGNOSIS — E11.65 TYPE 2 DIABETES MELLITUS WITH HYPERGLYCEMIA, WITHOUT LONG-TERM CURRENT USE OF INSULIN (HCC): ICD-10-CM

## 2017-06-12 DIAGNOSIS — E11.65 TYPE 2 DIABETES MELLITUS WITH HYPERGLYCEMIA, WITHOUT LONG-TERM CURRENT USE OF INSULIN (HCC): Primary | ICD-10-CM

## 2017-06-12 LAB
ALBUMIN SERPL BCP-MCNC: 3.2 G/DL (ref 3.4–5)
ALBUMIN/GLOB SERPL: 0.9 {RATIO} (ref 0.8–1.7)
ALP SERPL-CCNC: 82 U/L (ref 45–117)
ALT SERPL-CCNC: 35 U/L (ref 16–61)
ANION GAP BLD CALC-SCNC: 7 MMOL/L (ref 3–18)
AST SERPL W P-5'-P-CCNC: 17 U/L (ref 15–37)
BASOPHILS # BLD AUTO: 0 K/UL (ref 0–0.06)
BASOPHILS # BLD: 0 % (ref 0–2)
BILIRUB SERPL-MCNC: 0.4 MG/DL (ref 0.2–1)
BUN SERPL-MCNC: 15 MG/DL (ref 7–18)
BUN/CREAT SERPL: 12 (ref 12–20)
CALCIUM SERPL-MCNC: 8.9 MG/DL (ref 8.5–10.1)
CHLORIDE SERPL-SCNC: 108 MMOL/L (ref 100–108)
CHOLEST SERPL-MCNC: 211 MG/DL
CO2 SERPL-SCNC: 26 MMOL/L (ref 21–32)
CREAT SERPL-MCNC: 1.22 MG/DL (ref 0.6–1.3)
DIFFERENTIAL METHOD BLD: ABNORMAL
EOSINOPHIL # BLD: 0.1 K/UL (ref 0–0.4)
EOSINOPHIL NFR BLD: 2 % (ref 0–5)
ERYTHROCYTE [DISTWIDTH] IN BLOOD BY AUTOMATED COUNT: 15.9 % (ref 11.6–14.5)
GLOBULIN SER CALC-MCNC: 3.5 G/DL (ref 2–4)
GLUCOSE SERPL-MCNC: 129 MG/DL (ref 74–99)
HBA1C MFR BLD: 6.7 % (ref 4.2–5.6)
HCT VFR BLD AUTO: 41.8 % (ref 36–48)
HDLC SERPL-MCNC: 56 MG/DL (ref 40–60)
HDLC SERPL: 3.8 {RATIO} (ref 0–5)
HGB BLD-MCNC: 13.3 G/DL (ref 13–16)
LDLC SERPL CALC-MCNC: 138.2 MG/DL (ref 0–100)
LIPID PROFILE,FLP: ABNORMAL
LYMPHOCYTES # BLD AUTO: 41 % (ref 21–52)
LYMPHOCYTES # BLD: 1.6 K/UL (ref 0.9–3.6)
MCH RBC QN AUTO: 30.4 PG (ref 24–34)
MCHC RBC AUTO-ENTMCNC: 31.8 G/DL (ref 31–37)
MCV RBC AUTO: 95.7 FL (ref 74–97)
MONOCYTES # BLD: 0.3 K/UL (ref 0.05–1.2)
MONOCYTES NFR BLD AUTO: 8 % (ref 3–10)
NEUTS SEG # BLD: 2 K/UL (ref 1.8–8)
NEUTS SEG NFR BLD AUTO: 49 % (ref 40–73)
PLATELET # BLD AUTO: 202 K/UL (ref 135–420)
PMV BLD AUTO: 10.8 FL (ref 9.2–11.8)
POTASSIUM SERPL-SCNC: 4 MMOL/L (ref 3.5–5.5)
PROT SERPL-MCNC: 6.7 G/DL (ref 6.4–8.2)
RBC # BLD AUTO: 4.37 M/UL (ref 4.7–5.5)
SODIUM SERPL-SCNC: 141 MMOL/L (ref 136–145)
T4 FREE SERPL-MCNC: 0.9 NG/DL (ref 0.7–1.5)
TRIGL SERPL-MCNC: 84 MG/DL (ref ?–150)
TSH SERPL DL<=0.05 MIU/L-ACNC: 1.52 UIU/ML (ref 0.36–3.74)
VLDLC SERPL CALC-MCNC: 16.8 MG/DL
WBC # BLD AUTO: 4 K/UL (ref 4.6–13.2)

## 2017-06-12 PROCEDURE — 80061 LIPID PANEL: CPT | Performed by: HOSPITALIST

## 2017-06-12 PROCEDURE — 80053 COMPREHEN METABOLIC PANEL: CPT | Performed by: HOSPITALIST

## 2017-06-12 PROCEDURE — 84439 ASSAY OF FREE THYROXINE: CPT | Performed by: HOSPITALIST

## 2017-06-12 PROCEDURE — 83036 HEMOGLOBIN GLYCOSYLATED A1C: CPT | Performed by: HOSPITALIST

## 2017-06-12 PROCEDURE — 85025 COMPLETE CBC W/AUTO DIFF WBC: CPT | Performed by: HOSPITALIST

## 2017-06-12 PROCEDURE — 84443 ASSAY THYROID STIM HORMONE: CPT | Performed by: HOSPITALIST

## 2017-06-12 PROCEDURE — 82306 VITAMIN D 25 HYDROXY: CPT | Performed by: HOSPITALIST

## 2017-06-12 PROCEDURE — 36415 COLL VENOUS BLD VENIPUNCTURE: CPT | Performed by: HOSPITALIST

## 2017-06-12 NOTE — PATIENT INSTRUCTIONS
DASH Diet: Care Instructions  Your Care Instructions  The DASH diet is an eating plan that can help lower your blood pressure. DASH stands for Dietary Approaches to Stop Hypertension. Hypertension is high blood pressure. The DASH diet focuses on eating foods that are high in calcium, potassium, and magnesium. These nutrients can lower blood pressure. The foods that are highest in these nutrients are fruits, vegetables, low-fat dairy products, nuts, seeds, and legumes. But taking calcium, potassium, and magnesium supplements instead of eating foods that are high in those nutrients does not have the same effect. The DASH diet also includes whole grains, fish, and poultry. The DASH diet is one of several lifestyle changes your doctor may recommend to lower your high blood pressure. Your doctor may also want you to decrease the amount of sodium in your diet. Lowering sodium while following the DASH diet can lower blood pressure even further than just the DASH diet alone. Follow-up care is a key part of your treatment and safety. Be sure to make and go to all appointments, and call your doctor if you are having problems. It's also a good idea to know your test results and keep a list of the medicines you take. How can you care for yourself at home? Following the DASH diet  · Eat 4 to 5 servings of fruit each day. A serving is 1 medium-sized piece of fruit, ½ cup chopped or canned fruit, 1/4 cup dried fruit, or 4 ounces (½ cup) of fruit juice. Choose fruit more often than fruit juice. · Eat 4 to 5 servings of vegetables each day. A serving is 1 cup of lettuce or raw leafy vegetables, ½ cup of chopped or cooked vegetables, or 4 ounces (½ cup) of vegetable juice. Choose vegetables more often than vegetable juice. · Get 2 to 3 servings of low-fat and fat-free dairy each day. A serving is 8 ounces of milk, 1 cup of yogurt, or 1 ½ ounces of cheese. · Eat 6 to 8 servings of grains each day.  A serving is 1 slice of bread, 1 ounce of dry cereal, or ½ cup of cooked rice, pasta, or cooked cereal. Try to choose whole-grain products as much as possible. · Limit lean meat, poultry, and fish to 2 servings each day. A serving is 3 ounces, about the size of a deck of cards. · Eat 4 to 5 servings of nuts, seeds, and legumes (cooked dried beans, lentils, and split peas) each week. A serving is 1/3 cup of nuts, 2 tablespoons of seeds, or ½ cup of cooked beans or peas. · Limit fats and oils to 2 to 3 servings each day. A serving is 1 teaspoon of vegetable oil or 2 tablespoons of salad dressing. · Limit sweets and added sugars to 5 servings or less a week. A serving is 1 tablespoon jelly or jam, ½ cup sorbet, or 1 cup of lemonade. · Eat less than 2,300 milligrams (mg) of sodium a day. If you limit your sodium to 1,500 mg a day, you can lower your blood pressure even more. Tips for success  · Start small. Do not try to make dramatic changes to your diet all at once. You might feel that you are missing out on your favorite foods and then be more likely to not follow the plan. Make small changes, and stick with them. Once those changes become habit, add a few more changes. · Try some of the following:  ¨ Make it a goal to eat a fruit or vegetable at every meal and at snacks. This will make it easy to get the recommended amount of fruits and vegetables each day. ¨ Try yogurt topped with fruit and nuts for a snack or healthy dessert. ¨ Add lettuce, tomato, cucumber, and onion to sandwiches. ¨ Combine a ready-made pizza crust with low-fat mozzarella cheese and lots of vegetable toppings. Try using tomatoes, squash, spinach, broccoli, carrots, cauliflower, and onions. ¨ Have a variety of cut-up vegetables with a low-fat dip as an appetizer instead of chips and dip. ¨ Sprinkle sunflower seeds or chopped almonds over salads. Or try adding chopped walnuts or almonds to cooked vegetables. ¨ Try some vegetarian meals using beans and peas. Add garbanzo or kidney beans to salads. Make burritos and tacos with mashed barboza beans or black beans. Where can you learn more? Go to http://david-kayla.info/. Enter M017 in the search box to learn more about \"DASH Diet: Care Instructions. \"  Current as of: March 23, 2016  Content Version: 11.2  © 0730-9846 [employer]. Care instructions adapted under license by [employer] (which disclaims liability or warranty for this information). If you have questions about a medical condition or this instruction, always ask your healthcare professional. [place] [address] any warranty or liability for your use of this information. Learning About Diabetes Food Guidelines  Your Care Instructions  Meal planning is important to manage diabetes. It helps keep your blood sugar at a target level (which you set with your doctor). You don't have to eat special foods. You can eat what your family eats, including sweets once in a while. But you do have to pay attention to how often you eat and how much you eat of certain foods. You may want to work with a dietitian or a certified diabetes educator (CDE) to help you plan meals and snacks. A dietitian or CDE can also help you lose weight if that is one of your goals. What should you know about eating carbs? Managing the amount of carbohydrate (carbs) you eat is an important part of healthy meals when you have diabetes. Carbohydrate is found in many foods. · Learn which foods have carbs. And learn the amounts of carbs in different foods. ¨ Bread, cereal, pasta, and rice have about 15 grams of carbs in a serving. A serving is 1 slice of bread (1 ounce), ½ cup of cooked cereal, or 1/3 cup of cooked pasta or rice. ¨ Fruits have 15 grams of carbs in a serving.  A serving is 1 small fresh fruit, such as an apple or orange; ½ of a banana; ½ cup of cooked or canned fruit; ½ cup of fruit juice; 1 cup of melon or raspberries; or 2 tablespoons of dried fruit. ¨ Milk and no-sugar-added yogurt have 15 grams of carbs in a serving. A serving is 1 cup of milk or 2/3 cup of no-sugar-added yogurt. ¨ Starchy vegetables have 15 grams of carbs in a serving. A serving is ½ cup of mashed potatoes or sweet potato; 1 cup winter squash; ½ of a small baked potato; ½ cup of cooked beans; or ½ cup cooked corn or green peas. · Learn how much carbs to eat each day and at each meal. A dietitian or CDE can teach you how to keep track of the amount of carbs you eat. This is called carbohydrate counting. · If you are not sure how to count carbohydrate grams, use the Plate Method to plan meals. It is a good, quick way to make sure that you have a balanced meal. It also helps you spread carbs throughout the day. ¨ Divide your plate by types of foods. Put non-starchy vegetables on half the plate, meat or other protein food on one-quarter of the plate, and a grain or starchy vegetable in the final quarter of the plate. To this you can add a small piece of fruit and 1 cup of milk or yogurt, depending on how many carbs you are supposed to eat at a meal.  · Try to eat about the same amount of carbs at each meal. Do not \"save up\" your daily allowance of carbs to eat at one meal.  · Proteins have very little or no carbs per serving. Examples of proteins are beef, chicken, turkey, fish, eggs, tofu, cheese, cottage cheese, and peanut butter. A serving size of meat is 3 ounces, which is about the size of a deck of cards. Examples of meat substitute serving sizes (equal to 1 ounce of meat) are 1/4 cup of cottage cheese, 1 egg, 1 tablespoon of peanut butter, and ½ cup of tofu. How can you eat out and still eat healthy? · Learn to estimate the serving sizes of foods that have carbohydrate. If you measure food at home, it will be easier to estimate the amount in a serving of restaurant food.   · If the meal you order has too much carbohydrate (such as potatoes, corn, or baked beans), ask to have a low-carbohydrate food instead. Ask for a salad or green vegetables. · If you use insulin, check your blood sugar before and after eating out to help you plan how much to eat in the future. · If you eat more carbohydrate at a meal than you had planned, take a walk or do other exercise. This will help lower your blood sugar. What else should you know? · Limit saturated fat, such as the fat from meat and dairy products. This is a healthy choice because people who have diabetes are at higher risk of heart disease. So choose lean cuts of meat and nonfat or low-fat dairy products. Use olive or canola oil instead of butter or shortening when cooking. · Don't skip meals. Your blood sugar may drop too low if you skip meals and take insulin or certain medicines for diabetes. · Check with your doctor before you drink alcohol. Alcohol can cause your blood sugar to drop too low. Alcohol can also cause a bad reaction if you take certain diabetes medicines. Follow-up care is a key part of your treatment and safety. Be sure to make and go to all appointments, and call your doctor if you are having problems. It's also a good idea to know your test results and keep a list of the medicines you take. Where can you learn more? Go to http://david-kayla.info/. Enter W422 in the search box to learn more about \"Learning About Diabetes Food Guidelines. \"  Current as of: May 23, 2016  Content Version: 11.2  © 7436-4112 [employer]. Care instructions adapted under license by [employer] (which disclaims liability or warranty for this information). If you have questions about a medical condition or this instruction, always ask your healthcare professional. [place] [address] any warranty or liability for your use of this information.        Starting a Weight Loss Plan: Care Instructions  Your Care Instructions  If you are thinking about losing weight, it can be hard to know where to start. Your doctor can help you set up a weight loss plan that best meets your needs. You may want to take a class on nutrition or exercise, or join a weight loss support group. If you have questions about how to make changes to your eating or exercise habits, ask your doctor about seeing a registered dietitian or an exercise specialist.  It can be a big challenge to lose weight. But you do not have to make huge changes at once. Make small changes, and stick with them. When those changes become habit, add a few more changes. If you do not think you are ready to make changes right now, try to pick a date in the future. Make an appointment to see your doctor to discuss whether the time is right for you to start a plan. Follow-up care is a key part of your treatment and safety. Be sure to make and go to all appointments, and call your doctor if you are having problems. Its also a good idea to know your test results and keep a list of the medicines you take. How can you care for yourself at home? · Set realistic goals. Many people expect to lose much more weight than is likely. A weight loss of 5% to 10% of your body weight may be enough to improve your health. · Get family and friends involved to provide support. Talk to them about why you are trying to lose weight, and ask them to help. They can help by participating in exercise and having meals with you, even if they may be eating something different. · Find what works best for you. If you do not have time or do not like to cook, a program that offers meal replacement bars or shakes may be better for you. Or if you like to prepare meals, finding a plan that includes daily menus and recipes may be best.  · Ask your doctor about other health professionals who can help you achieve your weight loss goals. ¨ A dietitian can help you make healthy changes in your diet.   ¨ An exercise specialist or  can help you develop a safe and effective exercise program.  ¨ A counselor or psychiatrist can help you cope with issues such as depression, anxiety, or family problems that can make it hard to focus on weight loss. · Consider joining a support group for people who are trying to lose weight. Your doctor can suggest groups in your area. Where can you learn more? Go to http://david-kayla.info/. Enter A037 in the search box to learn more about \"Starting a Weight Loss Plan: Care Instructions. \"  Current as of: October 13, 2016  Content Version: 11.2  © 4616-1977 [employer]. Care instructions adapted under license by [employer] (which disclaims liability or warranty for this information). If you have questions about a medical condition or this instruction, always ask your healthcare professional. [address] any warranty or liability for your use of this information.

## 2017-06-12 NOTE — PROGRESS NOTES
Chief Complaint   Patient presents with    Hypertension    Cholesterol Problem    Results     labs results   Patient is here for F/U.   1. Have you been to the ER, urgent care clinic since your last visit? Hospitalized since your last visit? No    2. Have you seen or consulted any other health care providers outside of the [address] [place] [address] since your last visit? Include any pap smears or colon screening.  No

## 2017-06-12 NOTE — MR AVS SNAPSHOT
Visit Information Date & Time Provider Department Dept. Phone Encounter #  
 6/12/2017  8:00  [address] DO [facility] 8 [id number] Follow-up Instructions Return in about 3 months (around 9/12/2017) for Labs 1 week before. Your Appointments 7/6/2017  2:15 PM  
ESTABLISHED PATIENT with Jerri Lozano MD  
[facility] [address]) Appt Note: Return for MD appt in 3mos with PSA on arrival.. [address] [facility], [place] [address] [facility] [address]  
  
   
 [address] [facility], [address] [facility] [address] Upcoming Health Maintenance Date Due DTaP/Tdap/Td series (1 - Tdap) 4/23/1968 ZOSTER VACCINE AGE 60> 4/23/2007 GLAUCOMA SCREENING Q2Y 4/23/2012 MEDICARE YEARLY EXAM 4/23/2012 EYE EXAM RETINAL OR DILATED Q1 3/3/2015 INFLUENZA AGE 9 TO ADULT 8/1/2017 HEMOGLOBIN A1C Q6M 8/6/2017 MICROALBUMIN Q1 11/2/2017 LIPID PANEL Q1 11/2/2017 FOOT EXAM Q1 6/12/2018 COLONOSCOPY 11/21/2021 Allergies as of 6/12/2017  Review Complete On: 6/12/2017 By: Linda Skelton LPN No Known Allergies Current Immunizations  Reviewed on 7/12/2012 Name Date Pneumococcal Vaccine (Unspecified Type) 7/12/2012 Not reviewed this visit You Were Diagnosed With   
  
 Codes Comments Type 2 diabetes mellitus with hyperglycemia, without long-term current use of insulin (HCC)    -  Primary ICD-10-CM: E11.65 ICD-9-CM: 250.00, 790.29 Pure hypercholesterolemia     ICD-10-CM: E78.00 ICD-9-CM: 272.0 Essential hypertension     ICD-10-CM: I10 
ICD-9-CM: 401.9 Obesity (BMI 30.0-34.9)     ICD-10-CM: Q16.9 ICD-9-CM: 278.00 Vitamin D deficiency     ICD-10-CM: E55.9 ICD-9-CM: 268.9 Vitals BP Pulse Temp Resp Height(growth percentile) Weight(growth percentile)  140/78 (BP 1 Location: Right arm, BP Patient Position: Sitting) 69 98.1 °F (36.7 °C) (Oral) 16 5' 4\" (1.626 m) 189 lb 6.4 oz (85.9 kg) SpO2 BMI Smoking Status 98% 32.51 kg/m2 Never Smoker Vitals History BMI and BSA Data Body Mass Index Body Surface Area 32.51 kg/m 2 1.97 m 2 Preferred Pharmacy Pharmacy Name Phone 42 [facility] Jose Guevara [phone] Your Updated Medication List  
  
   
This list is accurate as of: 6/12/17  8:47 AM.  Always use your most recent med list.  
  
  
  
  
 AGGRENOX  mg per SR capsule Generic drug:  aspirin-dipyridamole Take 1 Cap by Mouth Twice Daily. atorvastatin 80 mg tablet Commonly known as:  LIPITOR Take 1 Tab by mouth daily. clopidogrel 75 mg Tab Commonly known as:  PLAVIX Take 1 Tab by mouth daily. FISH OIL 1,000 mg Cap Generic drug:  omega-3 fatty acids-vitamin e Take 1 Cap by mouth. hydroCHLOROthiazide 25 mg tablet Commonly known as:  HYDRODIURIL Take 1 Tab by mouth daily. * losartan 100 mg tablet Commonly known as:  COZAAR  
100 mg.  
  
 * losartan 100 mg tablet Commonly known as:  COZAAR Take 1 Tab by mouth daily. * metFORMIN 500 mg tablet Commonly known as:  GLUCOPHAGE  
500 mg.  
  
 * metFORMIN 500 mg tablet Commonly known as:  GLUCOPHAGE Take 1 Tab by mouth two (2) times daily (with meals). oxybutynin 5 mg tablet Commonly known as:  DITROPAN  
5 mg.  
  
 tadalafil 5 mg tablet Commonly known as:  CIALIS Take 1 Tab by mouth daily. * FLOMAX 0.4 mg capsule Generic drug:  tamsulosin 0.4 mg.  
  
 * tamsulosin 0.4 mg capsule Commonly known as:  FLOMAX Take 2 Caps by mouth daily (after dinner). terazosin 2 mg capsule Commonly known as:  HYTRIN  
2 mg. VITAMIN D3 1,000 unit Cap Generic drug:  cholecalciferol Take  by mouth daily. * Notice:   This list has 6 medication(s) that are the same as other medications prescribed for you. Read the directions carefully, and ask your doctor or other care provider to review them with you. Follow-up Instructions Return in about 3 months (around 9/12/2017) for Labs 1 week before. To-Do List   
 09/10/2017 Lab:  HEMOGLOBIN A1C W/O EAG   
  
 09/10/2017 Lab:  MICROALBUMIN, UR, RAND W/ MICROALBUMIN/CREA RATIO Patient Instructions DASH Diet: Care Instructions Your Care Instructions The DASH diet is an eating plan that can help lower your blood pressure. DASH stands for Dietary Approaches to Stop Hypertension. Hypertension is high blood pressure. The DASH diet focuses on eating foods that are high in calcium, potassium, and magnesium. These nutrients can lower blood pressure. The foods that are highest in these nutrients are fruits, vegetables, low-fat dairy products, nuts, seeds, and legumes. But taking calcium, potassium, and magnesium supplements instead of eating foods that are high in those nutrients does not have the same effect. The DASH diet also includes whole grains, fish, and poultry. The DASH diet is one of several lifestyle changes your doctor may recommend to lower your high blood pressure. Your doctor may also want you to decrease the amount of sodium in your diet. Lowering sodium while following the DASH diet can lower blood pressure even further than just the DASH diet alone. Follow-up care is a key part of your treatment and safety. Be sure to make and go to all appointments, and call your doctor if you are having problems. It's also a good idea to know your test results and keep a list of the medicines you take. How can you care for yourself at home? Following the DASH diet · Eat 4 to 5 servings of fruit each day. A serving is 1 medium-sized piece of fruit, ½ cup chopped or canned fruit, 1/4 cup dried fruit, or 4 ounces (½ cup) of fruit juice. Choose fruit more often than fruit juice. · Eat 4 to 5 servings of vegetables each day. A serving is 1 cup of lettuce or raw leafy vegetables, ½ cup of chopped or cooked vegetables, or 4 ounces (½ cup) of vegetable juice. Choose vegetables more often than vegetable juice. · Get 2 to 3 servings of low-fat and fat-free dairy each day. A serving is 8 ounces of milk, 1 cup of yogurt, or 1 ½ ounces of cheese. · Eat 6 to 8 servings of grains each day. A serving is 1 slice of bread, 1 ounce of dry cereal, or ½ cup of cooked rice, pasta, or cooked cereal. Try to choose whole-grain products as much as possible. · Limit lean meat, poultry, and fish to 2 servings each day. A serving is 3 ounces, about the size of a deck of cards. · Eat 4 to 5 servings of nuts, seeds, and legumes (cooked dried beans, lentils, and split peas) each week. A serving is 1/3 cup of nuts, 2 tablespoons of seeds, or ½ cup of cooked beans or peas. · Limit fats and oils to 2 to 3 servings each day. A serving is 1 teaspoon of vegetable oil or 2 tablespoons of salad dressing. · Limit sweets and added sugars to 5 servings or less a week. A serving is 1 tablespoon jelly or jam, ½ cup sorbet, or 1 cup of lemonade. · Eat less than 2,300 milligrams (mg) of sodium a day. If you limit your sodium to 1,500 mg a day, you can lower your blood pressure even more. Tips for success · Start small. Do not try to make dramatic changes to your diet all at once. You might feel that you are missing out on your favorite foods and then be more likely to not follow the plan. Make small changes, and stick with them. Once those changes become habit, add a few more changes. · Try some of the following: ¨ Make it a goal to eat a fruit or vegetable at every meal and at snacks. This will make it easy to get the recommended amount of fruits and vegetables each day. ¨ Try yogurt topped with fruit and nuts for a snack or healthy dessert. ¨ Add lettuce, tomato, cucumber, and onion to sandwiches. ¨ Combine a ready-made pizza crust with low-fat mozzarella cheese and lots of vegetable toppings. Try using tomatoes, squash, spinach, broccoli, carrots, cauliflower, and onions. ¨ Have a variety of cut-up vegetables with a low-fat dip as an appetizer instead of chips and dip. ¨ Sprinkle sunflower seeds or chopped almonds over salads. Or try adding chopped walnuts or almonds to cooked vegetables. ¨ Try some vegetarian meals using beans and peas. Add garbanzo or kidney beans to salads. Make burritos and tacos with mashed barboza beans or black beans. Where can you learn more? Go to http://david-kayla.info/. Enter K470 in the search box to learn more about \"DASH Diet: Care Instructions. \" Current as of: March 23, 2016 Content Version: 11.2 © 1609-2810 [employer]. Care instructions adapted under license by [employer] (which disclaims liability or warranty for this information). If you have questions about a medical condition or this instruction, always ask your healthcare professional. [place] [address] any warranty or liability for your use of this information. Learning About Diabetes Food Guidelines Your Care Instructions Meal planning is important to manage diabetes. It helps keep your blood sugar at a target level (which you set with your doctor). You don't have to eat special foods. You can eat what your family eats, including sweets once in a while. But you do have to pay attention to how often you eat and how much you eat of certain foods. You may want to work with a dietitian or a certified diabetes educator (CDE) to help you plan meals and snacks. A dietitian or CDE can also help you lose weight if that is one of your goals. What should you know about eating carbs? Managing the amount of carbohydrate (carbs) you eat is an important part of healthy meals when you have diabetes. Carbohydrate is found in many foods. · Learn which foods have carbs. And learn the amounts of carbs in different foods. ¨ Bread, cereal, pasta, and rice have about 15 grams of carbs in a serving. A serving is 1 slice of bread (1 ounce), ½ cup of cooked cereal, or 1/3 cup of cooked pasta or rice. ¨ Fruits have 15 grams of carbs in a serving. A serving is 1 small fresh fruit, such as an apple or orange; ½ of a banana; ½ cup of cooked or canned fruit; ½ cup of fruit juice; 1 cup of melon or raspberries; or 2 tablespoons of dried fruit. ¨ Milk and no-sugar-added yogurt have 15 grams of carbs in a serving. A serving is 1 cup of milk or 2/3 cup of no-sugar-added yogurt. ¨ Starchy vegetables have 15 grams of carbs in a serving. A serving is ½ cup of mashed potatoes or sweet potato; 1 cup winter squash; ½ of a small baked potato; ½ cup of cooked beans; or ½ cup cooked corn or green peas. · Learn how much carbs to eat each day and at each meal. A dietitian or CDE can teach you how to keep track of the amount of carbs you eat. This is called carbohydrate counting. · If you are not sure how to count carbohydrate grams, use the Plate Method to plan meals. It is a good, quick way to make sure that you have a balanced meal. It also helps you spread carbs throughout the day. ¨ Divide your plate by types of foods. Put non-starchy vegetables on half the plate, meat or other protein food on one-quarter of the plate, and a grain or starchy vegetable in the final quarter of the plate. To this you can add a small piece of fruit and 1 cup of milk or yogurt, depending on how many carbs you are supposed to eat at a meal. 
· Try to eat about the same amount of carbs at each meal. Do not \"save up\" your daily allowance of carbs to eat at one meal. 
· Proteins have very little or no carbs per serving. Examples of proteins are beef, chicken, turkey, fish, eggs, tofu, cheese, cottage cheese, and peanut butter.  A serving size of meat is 3 ounces, which is about the size of a deck of cards. Examples of meat substitute serving sizes (equal to 1 ounce of meat) are 1/4 cup of cottage cheese, 1 egg, 1 tablespoon of peanut butter, and ½ cup of tofu. How can you eat out and still eat healthy? · Learn to estimate the serving sizes of foods that have carbohydrate. If you measure food at home, it will be easier to estimate the amount in a serving of restaurant food. · If the meal you order has too much carbohydrate (such as potatoes, corn, or baked beans), ask to have a low-carbohydrate food instead. Ask for a salad or green vegetables. · If you use insulin, check your blood sugar before and after eating out to help you plan how much to eat in the future. · If you eat more carbohydrate at a meal than you had planned, take a walk or do other exercise. This will help lower your blood sugar. What else should you know? · Limit saturated fat, such as the fat from meat and dairy products. This is a healthy choice because people who have diabetes are at higher risk of heart disease. So choose lean cuts of meat and nonfat or low-fat dairy products. Use olive or canola oil instead of butter or shortening when cooking. · Don't skip meals. Your blood sugar may drop too low if you skip meals and take insulin or certain medicines for diabetes. · Check with your doctor before you drink alcohol. Alcohol can cause your blood sugar to drop too low. Alcohol can also cause a bad reaction if you take certain diabetes medicines. Follow-up care is a key part of your treatment and safety. Be sure to make and go to all appointments, and call your doctor if you are having problems. It's also a good idea to know your test results and keep a list of the medicines you take. Where can you learn more? Go to http://david-kayla.info/. Enter W144 in the search box to learn more about \"Learning About Diabetes Food Guidelines. \" Current as of: May 23, 2016 Content Version: 11.2 © 7086-1851 [employer]. Care instructions adapted under license by [employer] (which disclaims liability or warranty for this information). If you have questions about a medical condition or this instruction, always ask your healthcare professional. [address] any warranty or liability for your use of this information. Starting a Weight Loss Plan: Care Instructions Your Care Instructions If you are thinking about losing weight, it can be hard to know where to start. Your doctor can help you set up a weight loss plan that best meets your needs. You may want to take a class on nutrition or exercise, or join a weight loss support group. If you have questions about how to make changes to your eating or exercise habits, ask your doctor about seeing a registered dietitian or an exercise specialist. 
It can be a big challenge to lose weight. But you do not have to make huge changes at once. Make small changes, and stick with them. When those changes become habit, add a few more changes. If you do not think you are ready to make changes right now, try to pick a date in the future. Make an appointment to see your doctor to discuss whether the time is right for you to start a plan. Follow-up care is a key part of your treatment and safety. Be sure to make and go to all appointments, and call your doctor if you are having problems. Its also a good idea to know your test results and keep a list of the medicines you take. How can you care for yourself at home? · Set realistic goals. Many people expect to lose much more weight than is likely. A weight loss of 5% to 10% of your body weight may be enough to improve your health. · Get family and friends involved to provide support. Talk to them about why you are trying to lose weight, and ask them to help.  They can help by participating in exercise and having meals with you, even if they may be eating something different. · Find what works best for you. If you do not have time or do not like to cook, a program that offers meal replacement bars or shakes may be better for you. Or if you like to prepare meals, finding a plan that includes daily menus and recipes may be best. 
· Ask your doctor about other health professionals who can help you achieve your weight loss goals. ¨ A dietitian can help you make healthy changes in your diet. ¨ An exercise specialist or  can help you develop a safe and effective exercise program. 
¨ A counselor or psychiatrist can help you cope with issues such as depression, anxiety, or family problems that can make it hard to focus on weight loss. · Consider joining a support group for people who are trying to lose weight. Your doctor can suggest groups in your area. Where can you learn more? Go to http://david-kayla.info/. Enter Y738 in the search box to learn more about \"Starting a Weight Loss Plan: Care Instructions. \" Current as of: October 13, 2016 Content Version: 11.2 © 4523-5221 [employer]. Care instructions adapted under license by [employer] (which disclaims liability or warranty for this information). If you have questions about a medical condition or this instruction, always ask your healthcare professional. [address] any warranty or liability for your use of this information. Introducing [facility] [place] [facility]! Jeanine Fernandez introduces [employer] patient portal. Now you can access parts of your medical record, email your doctor's office, and request medication refills online. 1. In your internet browser, go to [email or web address] 2. Click on the First Time User? Click Here link in the Sign In box. You will see the New Member Sign Up page. 3. Enter your [employer] Access Code exactly as it appears below.  You will not need to use this code after youve completed the sign-up process. If you do not sign up before the expiration date, you must request a new code. · [employer] Access Code: [email or web address] Expires: 9/10/2017  8:47 AM 
 
4. Enter the last four digits of your Social Security Number (xxxx) and Date of Birth (mm/dd/yyyy) as indicated and click Submit. You will be taken to the next sign-up page. 5. Create a [employer] ID. This will be your [employer] login ID and cannot be changed, so think of one that is secure and easy to remember. 6. Create a [employer] password. You can change your password at any time. 7. Enter your Password Reset Question and Answer. This can be used at a later time if you forget your password. 8. Enter your e-mail address. You will receive e-mail notification when new information is available in [address]. 9. Click Sign Up. You can now view and download portions of your medical record. 10. Click the Download Summary menu link to download a portable copy of your medical information. If you have questions, please visit the Frequently Asked Questions section of the [employer] website. Remember, [employer] is NOT to be used for urgent needs. For medical emergencies, dial 911. Now available from your iPhone and Android! Please provide this summary of care documentation to your next provider. Your primary care clinician is listed as Lars Verma. If you have any questions after today's visit, please call [phone].

## 2017-06-12 NOTE — PROGRESS NOTES
Chief Complaint   Patient presents with    Hypertension    Cholesterol Problem    Results     labs results       HPI:  Patient is a 81year old obese  male with medical problems listed below presents today for follow up of Hypertension, DM 2, Hyperlipidemia, Vit D def, etc. He has been feeling well and voices no complaints today. He is complaint with his medications with no adverse effects reported and has gained one pound in the last two months. He just had his blood work done earlier today prior to today's visit. Past Medical History:   Diagnosis Date    BPH (benign prostatic hypertrophy) with urinary obstruction     CAD (coronary artery disease) 6-21-02 8-5-06 Thallium ST: +inferolat ischemic    CVA 3-17-06    acute CVA, L [place] w/ mild R sided weakness    Diabetes ([facility] [address]     Diabetes mellitus ([facility] [address] 7/12/2012    ED (erectile dysfunction) 12-22-08    Elevated PSA 11/21/2011    Erectile dysfunction due to arterial insufficiency     Erectile dysfunction of organic origin     Essential hypertension     Heartburn 12-4-03    Hemorrhoids     Hypercholesterolemia 7-15-08    TSH 0.88    Hyperglycemia     Hyperlipidemia     Hypertension 6-22-01    MILD    Obstructive sleep apnea (adult) (pediatric) 2-2009    Prediabetes 8/18/2010    Prostate cancer ([facility] [address] 03.25.2016    [place] 7 in 1/12 cores and [place] 6 in 2/12 cores. PSA 6.0ng/mL. TRUS Volume 143 grams    Psoriasis     Rising PSA level     Rotator cuff tear, right     followed by Dr. Rich Mcclelland ([facility])    Seborrheic dermatitis     Shoulder pain, right 1/27/2012    Stroke [facility])     FIDEL (stress urinary incontinence), male     Vitamin D deficiency 11/25/2011     No Known Allergies    Current Outpatient Prescriptions   Medication Sig Dispense Refill    hydroCHLOROthiazide (HYDRODIURIL) 25 mg tablet Take 1 Tab by mouth daily. 30 Tab 3    clopidogrel (PLAVIX) 75 mg tab Take 1 Tab by mouth daily.  30 Tab 4  losartan (COZAAR) 100 mg tablet Take 1 Tab by mouth daily. 30 Tab 5    atorvastatin (LIPITOR) 80 mg tablet Take 1 Tab by mouth daily. 30 Tab 4    aspirin-dipyridamole (AGGRENOX)  mg per SR capsule Take 1 Cap by Mouth Twice Daily.  oxybutynin (DITROPAN) 5 mg tablet 5 mg.  metFORMIN (GLUCOPHAGE) 500 mg tablet 500 mg.      losartan (COZAAR) 100 mg tablet 100 mg.  tamsulosin (FLOMAX) 0.4 mg capsule 0.4 mg.      terazosin (HYTRIN) 2 mg capsule 2 mg.  tadalafil (CIALIS) 5 mg tablet Take 1 Tab by mouth daily. 30 Tab 3    cholecalciferol (VITAMIN D3) 1,000 unit cap Take  by mouth daily.  tamsulosin (FLOMAX) 0.4 mg capsule Take 2 Caps by mouth daily (after dinner). 180 Cap 3    metFORMIN (GLUCOPHAGE) 500 mg tablet Take 1 Tab by mouth two (2) times daily (with meals). 60 Tab 4    omega-3 fatty acids-vitamin e (FISH OIL) 1,000 mg cap Take 1 Cap by mouth. Past Surgical History:   Procedure Laterality Date    HX KNEE ARTHROSCOPY      HX PROSTATECTOMY  11/16/2016    Robotic-assisted laparoscopic radical prostatectomy. Robotic-assisted laparoscopic bilateral pelvic lymph node dissection  .Dissection was very difficult, bladder neck repair/anastamosis complex and time for this was >50% of time ordinarily required for this procedure.     [place] COLONOSCOPY FLX DX W/COLLJ SPEC WHEN PFRMD  4-30-01    papules/ Christiano    [place] COLONOSCOPY FLX DX W/COLLJ SPEC WHEN PFRMD      normal . Dr. Ambrosio Garcia       ROS:  Constitutional: Negative for fever, chills, or fatigue  Neurological: Negative for headache, dizziness, visual disturbance, or loss of conciousness  Respiratory: Negative for SOB, hemoptysis, or wheezing  Cardiovascular: Negative for chest pain, palpitation, or leg swelling  Gastrointestinal: Negative for abdominal pain, nausea, vomiting, diarrhea, blood in stool, melena, or heartburn  Musculoskeletal: Negative for falls      Physical Exam:  Visit Vitals    /78 (BP 1 Location: Right arm, BP Patient Position: Sitting)    Pulse 69    Temp 98.1 °F (36.7 °C) (Oral)    Resp 16    Ht 5' 4\" (1.626 m)    Wt 189 lb 6.4 oz (85.9 kg)    SpO2 98%    BMI 32.51 kg/m2     General: Obese black male, a & o x 3, afebrile, well-nourished, interacting appropriately, in no acute distress  Skin: warm and dry, no rashes , no bruises  Neck: supple, symmetrical, no thyromegaly  Respiratory: symmetrical chest expansion, lung sounds clear bilaterally, good air entry, good respiratory effort, no wheezes or crackles  Cardiovascular: normal S1S2, regular rate and rhythm, no murmurs, pulses palpable, no thrill, no carotid or abdominal bruits, trace edema noted, no JVD  Abdomen: non-distended, normoactive bowel sounds x 4 quadrants, soft, non-tender to palpation  Musculoskeletal: normal ROM on all joints, no swelling or deformity, no perilumbar tenderness, steady gait  Diabetic foot exam: pedal pulses palpable, no callus noted, and passed monofilament test.          Assessment/Plan:    ICD-10-CM ICD-9-CM    1. Type 2 diabetes mellitus with hyperglycemia, without long-term current use of insulin ([facility]) E11.65 250.00 Continue Metformin and he was counseled on diabetic diet - HBA1C was checked today. Diabetic foot exam:     Left: Reflexes 2+     Vibratory sensation normal    Proprioception normal   Sharp/dull discrimination normal    Filament test normal sensation with micro filament   Pulse DP: 2+ (normal)   Pulse PT: 2+ (normal)   Deformities: None  Right: Reflexes 2+   Vibratory sensation normal   Proprioception normal   Sharp/dull discrimination normal   Filament test normal sensation with micro filament   Pulse DP: 2+ (normal)   Pulse PT: 2+ (normal)   Deformities: None  HEMOGLOBIN A1C W/O EAG     790.29 MICROALBUMIN, UR, RAND W/ MICROALBUMIN/CREA RATIO   2. Pure hypercholesterolemia E78.00 272.0 Continue Lipitor 80 mg daily and he was counseled on low fat diet - lipid panel was checked today.    3. Essential hypertension I10 401.9 Controlled. Continue current meds and he was counseled on low salt diet. 4. Obesity (BMI 30.0-34. 9) E66.9 278.00 I have reviewed/discussed the above normal BMI with the patient. I have recommended the following interventions: dietary management education, guidance, and counseling, encourage exercise and monitor weight . .     5. Vitamin D deficiency E55.9 268.9 Stable and Vit D was checked today. Orders Placed This Encounter    HEMOGLOBIN A1C W/O EAG     Standing Status:   Future     Standing Expiration Date:   6/13/2018    MICROALBUMIN, UR, RAND W/ MICROALBUMIN/CREA RATIO     Standing Status:   Future     Standing Expiration Date:   6/13/2018           Additional Notes: Discussed today's diagnosis, treatment plans. Discussed medication indications and side effects. Weight Management: Counseled  After Visit Summary: Discussed provided printed patient instructions. Answered questions accordingly. Follow-up Disposition:  In 3 months with labs 1 week prior        Flip Telles DO, MPH  Internal Medicine

## 2017-06-13 LAB — 25(OH)D3 SERPL-MCNC: 38.8 NG/ML (ref 30–100)

## 2017-06-15 ENCOUNTER — TELEPHONE (OUTPATIENT)
Dept: INTERNAL MEDICINE CLINIC | Age: 72
End: 2017-06-15

## 2017-06-15 DIAGNOSIS — E78.00 PURE HYPERCHOLESTEROLEMIA: Primary | ICD-10-CM

## 2017-06-15 RX ORDER — EZETIMIBE 10 MG/1
10 TABLET ORAL DAILY
Qty: 30 TAB | Refills: 3 | Status: SHIPPED | OUTPATIENT
Start: 2017-06-15 | End: 2017-06-27 | Stop reason: SDUPTHER

## 2017-06-15 NOTE — TELEPHONE ENCOUNTER
Please call and notify pt that his labs were okay and diabetes was controlled with HBA1C at 6.7, but his lipids are elevated with Cho 211 and . Zetia 10 mg daily was sent to his pharmacy and he should continue taking Lipitor 80 mg daily and continue on low fat, low cholesterol, and diabetic diet.

## 2017-06-15 NOTE — TELEPHONE ENCOUNTER
Pt calling for lab results, said Dr Dayron Pruitt requested him to call     He can be reached at [phone]

## 2017-06-15 NOTE — TELEPHONE ENCOUNTER
I called Pt in regards Lab results and Medication. Informed pt that Per Dr Raji Costello his labs are okay and diabetes was controlled with A1C is 6.7 and Cholesterol is 211 with . Zetia has been sent to the pharmacy and he should continue taking the Lipitor 80mg daily. Also informed Pt to stay on low fat, Low cholesterol and diabetic diet. Pt verbalized understanding and would like to have lab sent to Fax# [phone]. Labs faxed.

## 2017-06-21 ENCOUNTER — TELEPHONE (OUTPATIENT)
Dept: INTERNAL MEDICINE CLINIC | Age: 72
End: 2017-06-21

## 2017-06-27 DIAGNOSIS — E78.00 PURE HYPERCHOLESTEROLEMIA: ICD-10-CM

## 2017-06-27 RX ORDER — EZETIMIBE 10 MG/1
10 TABLET ORAL DAILY
Qty: 30 TAB | Refills: 3 | Status: SHIPPED | OUTPATIENT
Start: 2017-06-27 | End: 2017-08-12

## 2017-06-27 NOTE — TELEPHONE ENCOUNTER
Requested Prescriptions     Pending Prescriptions Disp Refills    ezetimibe (ZETIA) 10 mg tablet 30 Tab 3     Sig: Take 1 Tab by mouth daily.      Last Office visit 6/12/17  Next office visit 9/12/17

## 2017-06-27 NOTE — TELEPHONE ENCOUNTER
I call Pt in regards to Rx refill on Zetia. Informed Pt that Rx was refilled and sent to the pharmacy.

## 2017-07-03 ENCOUNTER — TELEPHONE (OUTPATIENT)
Dept: INTERNAL MEDICINE CLINIC | Age: 72
End: 2017-07-03

## 2017-07-03 NOTE — TELEPHONE ENCOUNTER
I called [facility] program in regards to Pts Rx for Zetia. Currently the offices are closed. Will try again on Wednesday. Called and LM On VM informing Pt as well.

## 2017-07-03 NOTE — TELEPHONE ENCOUNTER
Pt calling re Zetia, his cost is $313 at local pharmacy and he can't pay that    He is asking if this can be submitted thru the [facility]. His current contract is valid thru 11/02/2017    The contract with [employer] [facility] was scanned to connect care 11/01/2016 (I printed it and gave it to nurse Tessa Brothers)    Pt request return call.

## 2017-07-05 NOTE — TELEPHONE ENCOUNTER
I called [facility] program Per Donna Patton, this patient is signed up with this program.  However he will have to received the mediation from a [facility] Ph# recvd for [place] [phone]  Ext [phone] and for [facility] [phone] Ext [phone], [phone] and [phone]. Called Pt in regards to decsion of [facility] [employer].

## 2017-07-05 NOTE — TELEPHONE ENCOUNTER
I called [facility] in regards to Zeita medication per Mary Moran the patient is currently enrolled in this program and can receive the medication only if he goes to the [facility] or [facility]. LM on  for return call.

## 2017-11-14 ENCOUNTER — OFFICE VISIT (OUTPATIENT)
Dept: FAMILY MEDICINE CLINIC | Age: 72
End: 2017-11-14

## 2017-11-14 ENCOUNTER — HOSPITAL ENCOUNTER (OUTPATIENT)
Dept: LAB | Age: 72
Discharge: HOME | End: 2017-11-14
Payer: MEDICARE

## 2017-11-14 VITALS
WEIGHT: 185.4 LBS | SYSTOLIC BLOOD PRESSURE: 140 MMHG | RESPIRATION RATE: 16 BRPM | DIASTOLIC BLOOD PRESSURE: 90 MMHG | HEIGHT: 64 IN | OXYGEN SATURATION: 96 % | HEART RATE: 87 BPM | TEMPERATURE: 97.9 F | BODY MASS INDEX: 31.65 KG/M2

## 2017-11-14 DIAGNOSIS — I10 ESSENTIAL HYPERTENSION: ICD-10-CM

## 2017-11-14 DIAGNOSIS — E55.9 VITAMIN D DEFICIENCY: ICD-10-CM

## 2017-11-14 DIAGNOSIS — E78.00 PURE HYPERCHOLESTEROLEMIA: ICD-10-CM

## 2017-11-14 DIAGNOSIS — E11.65 TYPE 2 DIABETES MELLITUS WITH HYPERGLYCEMIA, WITHOUT LONG-TERM CURRENT USE OF INSULIN (HCC): ICD-10-CM

## 2017-11-14 DIAGNOSIS — E66.9 OBESITY (BMI 30.0-34.9): ICD-10-CM

## 2017-11-14 DIAGNOSIS — I10 ESSENTIAL HYPERTENSION: Primary | ICD-10-CM

## 2017-11-14 LAB
ALBUMIN SERPL-MCNC: 3.5 G/DL (ref 3.4–5)
ALBUMIN/GLOB SERPL: 0.9 {RATIO} (ref 0.8–1.7)
ALP SERPL-CCNC: 93 U/L (ref 45–117)
ALT SERPL-CCNC: 37 U/L (ref 16–61)
ANION GAP SERPL CALC-SCNC: 9 MMOL/L (ref 3–18)
AST SERPL-CCNC: 17 U/L (ref 15–37)
BASOPHILS # BLD: 0 K/UL (ref 0–0.06)
BASOPHILS NFR BLD: 0 % (ref 0–2)
BILIRUB SERPL-MCNC: 0.3 MG/DL (ref 0.2–1)
BUN SERPL-MCNC: 14 MG/DL (ref 7–18)
BUN/CREAT SERPL: 10 (ref 12–20)
CALCIUM SERPL-MCNC: 8.8 MG/DL (ref 8.5–10.1)
CHLORIDE SERPL-SCNC: 104 MMOL/L (ref 100–108)
CHOLEST SERPL-MCNC: 220 MG/DL
CO2 SERPL-SCNC: 28 MMOL/L (ref 21–32)
CREAT SERPL-MCNC: 1.34 MG/DL (ref 0.6–1.3)
DIFFERENTIAL METHOD BLD: ABNORMAL
EOSINOPHIL # BLD: 0 K/UL (ref 0–0.4)
EOSINOPHIL NFR BLD: 1 % (ref 0–5)
ERYTHROCYTE [DISTWIDTH] IN BLOOD BY AUTOMATED COUNT: 14.2 % (ref 11.6–14.5)
EST. AVERAGE GLUCOSE BLD GHB EST-MCNC: 151 MG/DL
GLOBULIN SER CALC-MCNC: 4.1 G/DL (ref 2–4)
GLUCOSE SERPL-MCNC: 97 MG/DL (ref 74–99)
HBA1C MFR BLD: 6.9 % (ref 4.2–5.6)
HCT VFR BLD AUTO: 44.8 % (ref 36–48)
HDLC SERPL-MCNC: 57 MG/DL (ref 40–60)
HDLC SERPL: 3.9 {RATIO} (ref 0–5)
HGB BLD-MCNC: 14.7 G/DL (ref 13–16)
LDLC SERPL CALC-MCNC: 140.2 MG/DL (ref 0–100)
LIPID PROFILE,FLP: ABNORMAL
LYMPHOCYTES # BLD: 1.6 K/UL (ref 0.9–3.6)
LYMPHOCYTES NFR BLD: 38 % (ref 21–52)
MCH RBC QN AUTO: 30.6 PG (ref 24–34)
MCHC RBC AUTO-ENTMCNC: 32.8 G/DL (ref 31–37)
MCV RBC AUTO: 93.3 FL (ref 74–97)
MONOCYTES # BLD: 0.3 K/UL (ref 0.05–1.2)
MONOCYTES NFR BLD: 8 % (ref 3–10)
NEUTS SEG # BLD: 2.2 K/UL (ref 1.8–8)
NEUTS SEG NFR BLD: 53 % (ref 40–73)
PLATELET # BLD AUTO: 231 K/UL (ref 135–420)
PMV BLD AUTO: 11.7 FL (ref 9.2–11.8)
POTASSIUM SERPL-SCNC: 4 MMOL/L (ref 3.5–5.5)
PROT SERPL-MCNC: 7.6 G/DL (ref 6.4–8.2)
RBC # BLD AUTO: 4.8 M/UL (ref 4.7–5.5)
SODIUM SERPL-SCNC: 141 MMOL/L (ref 136–145)
T4 FREE SERPL-MCNC: 1.1 NG/DL (ref 0.7–1.5)
TRIGL SERPL-MCNC: 114 MG/DL (ref ?–150)
TSH SERPL DL<=0.05 MIU/L-ACNC: 1.33 UIU/ML (ref 0.36–3.74)
VLDLC SERPL CALC-MCNC: 22.8 MG/DL
WBC # BLD AUTO: 4.1 K/UL (ref 4.6–13.2)

## 2017-11-14 PROCEDURE — 83036 HEMOGLOBIN GLYCOSYLATED A1C: CPT | Performed by: HOSPITALIST

## 2017-11-14 PROCEDURE — 80053 COMPREHEN METABOLIC PANEL: CPT | Performed by: HOSPITALIST

## 2017-11-14 PROCEDURE — 84439 ASSAY OF FREE THYROXINE: CPT | Performed by: HOSPITALIST

## 2017-11-14 PROCEDURE — 85025 COMPLETE CBC W/AUTO DIFF WBC: CPT | Performed by: HOSPITALIST

## 2017-11-14 PROCEDURE — 82306 VITAMIN D 25 HYDROXY: CPT | Performed by: HOSPITALIST

## 2017-11-14 PROCEDURE — 84443 ASSAY THYROID STIM HORMONE: CPT | Performed by: HOSPITALIST

## 2017-11-14 PROCEDURE — 82043 UR ALBUMIN QUANTITATIVE: CPT | Performed by: HOSPITALIST

## 2017-11-14 PROCEDURE — 36415 COLL VENOUS BLD VENIPUNCTURE: CPT | Performed by: HOSPITALIST

## 2017-11-14 PROCEDURE — 80061 LIPID PANEL: CPT | Performed by: HOSPITALIST

## 2017-11-14 RX ORDER — GLUCOSAMINE SULFATE 1500 MG
POWDER IN PACKET (EA) ORAL DAILY
COMMUNITY
End: 2017-11-14 | Stop reason: SDUPTHER

## 2017-11-14 RX ORDER — GLUCOSAMINE SULFATE 1500 MG
1000 POWDER IN PACKET (EA) ORAL DAILY
Qty: 30 CAP | Refills: 5 | Status: SHIPPED | OUTPATIENT
Start: 2017-11-14 | End: 2018-01-11 | Stop reason: SDUPTHER

## 2017-11-14 RX ORDER — METFORMIN HYDROCHLORIDE 500 MG/1
500 TABLET ORAL 2 TIMES DAILY WITH MEALS
COMMUNITY
End: 2017-11-14 | Stop reason: SDUPTHER

## 2017-11-14 RX ORDER — METFORMIN HYDROCHLORIDE 500 MG/1
500 TABLET ORAL 2 TIMES DAILY WITH MEALS
Qty: 60 TAB | Refills: 5 | Status: SHIPPED | OUTPATIENT
Start: 2017-11-14 | End: 2018-01-11 | Stop reason: SDUPTHER

## 2017-11-14 RX ORDER — LOSARTAN POTASSIUM 100 MG/1
100 TABLET ORAL DAILY
Qty: 30 TAB | Refills: 5 | Status: SHIPPED | OUTPATIENT
Start: 2017-11-14 | End: 2018-03-06 | Stop reason: SDUPTHER

## 2017-11-14 RX ORDER — ATORVASTATIN CALCIUM 80 MG/1
TABLET, FILM COATED ORAL
Refills: 0 | COMMUNITY
Start: 2017-10-17 | End: 2017-11-14 | Stop reason: SDUPTHER

## 2017-11-14 RX ORDER — LOSARTAN POTASSIUM 100 MG/1
TABLET ORAL
Refills: 0 | COMMUNITY
Start: 2017-10-17 | End: 2017-11-14 | Stop reason: SDUPTHER

## 2017-11-14 RX ORDER — ATORVASTATIN CALCIUM 80 MG/1
80 TABLET, FILM COATED ORAL DAILY
Qty: 30 TAB | Refills: 5 | Status: SHIPPED | OUTPATIENT
Start: 2017-11-14 | End: 2018-03-06 | Stop reason: SDUPTHER

## 2017-11-14 RX ORDER — HYDROCHLOROTHIAZIDE 25 MG/1
25 TABLET ORAL DAILY
Qty: 30 TAB | Refills: 5 | Status: SHIPPED | OUTPATIENT
Start: 2017-11-14 | End: 2018-03-06 | Stop reason: SDUPTHER

## 2017-11-14 RX ORDER — HYDROCHLOROTHIAZIDE 25 MG/1
TABLET ORAL
Refills: 0 | COMMUNITY
Start: 2017-10-17 | End: 2017-11-14 | Stop reason: SDUPTHER

## 2017-11-14 RX ORDER — CLOPIDOGREL BISULFATE 75 MG/1
TABLET ORAL
Refills: 0 | COMMUNITY
Start: 2017-10-17 | End: 2017-11-21 | Stop reason: SDUPTHER

## 2017-11-14 NOTE — PROGRESS NOTES
Chief Complaint   Patient presents with    Medication Refill       HPI:  Patient is a 81year old obese  male with medical problems listed below presents today for follow up of Hypertension, DM 2, Hyperlipidemia, Vit D def, etc. He has been feeling well and voices no complaints today. He is complaint with his medications with no adverse effects reported. He is requesting refill of some meds today and he did not have labs done prior to today's visit and will be done today      Past Medical History:   Diagnosis Date    BPH (benign prostatic hypertrophy) with urinary obstruction     CAD (coronary artery disease) 6-21-02 8-5-06 Th[facility]ium ST: +inferolat ischemic    CVA 3-17-06    acute CVA, L [place] w/ mild R sided weakness    [facility] [address]     Diabetes mellitus ([facility] [address] 7/12/2012    ED (erectile dysfunction) 12-22-08    Elevated PSA 11/21/2011    Erectile dysfunction due to arterial insufficiency     Erectile dysfunction of organic origin     Essential hypertension     Heartburn 12-4-03    Hemorrhoids     Hypercholesterolemia 7-15-08    TSH 0.88    Hyperglycemia     Hyperlipidemia     Hypertension 6-22-01    MILD    Obstructive sleep apnea (adult) (pediatric) 2-2009    Prediabetes 8/18/2010    Prostate cancer ([facility] [address] 03/25/2016    [place] 7 in 1/12 cores and [place] 6 in 2/12 cores. PSA 6.0ng/mL. TRUS Volume 143 grams    Psoriasis     Rising PSA level     Rotator cuff tear, right     followed by Dr. Kenn Gil ([facility])    Seborrheic dermatitis     Shoulder pain, right 1/27/2012    Stroke [facility])     FIDEL (stress urinary incontinence), male     Vitamin D deficiency 11/25/2011     No Known Allergies    Current Outpatient Prescriptions   Medication Sig Dispense Refill    atorvastatin (LIPITOR) 80 mg tablet   0    clopidogrel (PLAVIX) 75 mg tab   0    hydroCHLOROthiazide (HYDRODIURIL) 25 mg tablet   0    losartan (COZAAR) 100 mg tablet   0    cholecalciferol (VITAMIN D3) 1,000 unit cap Take  by mouth daily.  metFORMIN (GLUCOPHAGE) 500 mg tablet Take 500 mg by mouth two (2) times daily (with meals). Past Surgical History:   Procedure Laterality Date    HX KNEE ARTHROSCOPY      HX PROSTATECTOMY  11/16/2016    Robotic-assisted laparoscopic radical prostatectomy. Robotic-assisted laparoscopic bilateral pelvic lymph node dissection  .Dissection was very difficult, bladder neck repair/anastamosis complex and time for this was >50% of time ordinarily required for this procedure.     [place] COLONOSCOPY FLX DX W/COLLJ SPEC WHEN PFRMD  4-30-01    papules/ Christiano    [place] COLONOSCOPY FLX DX W/COLLJ SPEC WHEN PFRMD      normal . Dr. Stephanie Stinson       ROS:  Constitutional: Negative for fever, chills, or fatigue  Neurological: Negative for headache, dizziness, visual disturbance, or loss of conciousness  Respiratory: Negative for SOB, hemoptysis, or wheezing  Cardiovascular: Negative for chest pain, palpitation, or leg swelling  Gastrointestinal: Negative for abdominal pain, nausea, vomiting, diarrhea, blood in stool, melena, or heartburn  Musculoskeletal: Negative for falls      Physical Exam:  Visit Vitals    /90 (BP 1 Location: Right arm, BP Patient Position: Sitting)    Pulse 87    Temp 97.9 °F (36.6 °C) (Oral)    Resp 16    Ht 5' 4\" (1.626 m)    Wt 185 lb 6.4 oz (84.1 kg)    SpO2 96%    BMI 31.82 kg/m2     General: Obese black male, a & o x 3, afebrile, well-nourished, interacting appropriately, in no acute distress  Skin: warm and dry, no rashes , no bruises  Neck: supple, symmetrical, no thyromegaly  Respiratory: symmetrical chest expansion, lung sounds clear bilaterally, good air entry, good respiratory effort, no wheezes or crackles  Cardiovascular: normal S1S2, regular rate and rhythm, no murmurs, pulses palpable, no thrill, no carotid or abdominal bruits, trace edema noted, no JVD  Abdomen: non-distended, normoactive bowel sounds x 4 quadrants, soft, non-tender to palpation  Musculoskeletal: normal ROM on all joints, no swelling or deformity, no perilumbar tenderness, steady gait  Diabetic foot exam: pedal pulses palpable and no callus noted        Assessment/Plan:    ICD-10-CM ICD-9-CM    1. Essential hypertension I10 401.9 Moderate control. Continue current meds and he was counseled on low salt diet. hydroCHLOROthiazide (HYDRODIURIL) 25 mg tablet      losartan (COZAAR) 100 mg tablet      METABOLIC PANEL, COMPREHENSIVE      CBC WITH AUTOMATED DIFF      TSH 3RD GENERATION      T4, FREE   2. Type 2 diabetes mellitus with hyperglycemia, without long-term current use of insulin ([facility]) E11.65 250.00 Controlled  Continue current meds and he was counseled on diabetic diet. HEMOGLOBIN A1C WITH EAG     790.29 MICROALBUMIN, UR, RAND W/ MICROALBUMIN/CREA RATIO      DISCONTINUED: metFORMIN (GLUCOPHAGE) 500 mg tablet   3. Pure hypercholesterolemia E78.00 272.0 atorvastatin (LIPITOR) 80 mg tablet      LIPID PANEL   4. Vitamin D deficiency E55.9 268.9 VITAMIN D, 25 HYDROXY      DISCONTINUED: cholecalciferol (VITAMIN D3) 1,000 unit cap   5. Obesity (BMI 30.0-34. 9) E66.9 278.00 I have reviewed/discussed the above normal BMI with the patient. I have recommended the following interventions: dietary management education, guidance, and counseling, encourage exercise and monitor weight . [place]            Orders Placed This Encounter    METABOLIC PANEL, COMPREHENSIVE     Standing Status:   Future     Number of Occurrences:   1     Standing Expiration Date:   11/15/2018    CBC WITH AUTOMATED DIFF     Standing Status:   Future     Number of Occurrences:   1     Standing Expiration Date:   11/15/2018    LIPID PANEL     Standing Status:   Future     Number of Occurrences:   1     Standing Expiration Date:   11/15/2018    VITAMIN D, 25 HYDROXY     Standing Status:   Future     Number of Occurrences:   1     Standing Expiration Date:   11/9/2018    TSH 3RD GENERATION     Standing Status:   Future     Number of Occurrences:   1     Standing Expiration Date:   11/15/2018    T4, FREE     Standing Status:   Future     Number of Occurrences:   1     Standing Expiration Date:   11/15/2018    HEMOGLOBIN A1C WITH EAG     Standing Status:   Future     Number of Occurrences:   1     Standing Expiration Date:   11/15/2018    MICROALBUMIN, UR, RAND W/ MICROALBUMIN/CREA RATIO     Standing Status:   Future     Number of Occurrences:   1     Standing Expiration Date:   11/15/2018    DISCONTD: atorvastatin (LIPITOR) 80 mg tablet     Refill:  0    DISCONTD: clopidogrel (PLAVIX) 75 mg tab     Refill:  0    DISCONTD: hydroCHLOROthiazide (HYDRODIURIL) 25 mg tablet     Refill:  0    DISCONTD: losartan (COZAAR) 100 mg tablet     Refill:  0    DISCONTD: cholecalciferol (VITAMIN D3) 1,000 unit cap     Sig: Take  by mouth daily.  DISCONTD: metFORMIN (GLUCOPHAGE) 500 mg tablet     Sig: Take 500 mg by mouth two (2) times daily (with meals).  DISCONTD: metFORMIN (GLUCOPHAGE) 500 mg tablet     Sig: Take 1 Tab by mouth two (2) times daily (with meals). Dispense:  60 Tab     Refill:  5    atorvastatin (LIPITOR) 80 mg tablet     Sig: Take 1 Tab by mouth daily. Dispense:  30 Tab     Refill:  5    hydroCHLOROthiazide (HYDRODIURIL) 25 mg tablet     Sig: Take 1 Tab by mouth daily. Dispense:  30 Tab     Refill:  5    losartan (COZAAR) 100 mg tablet     Sig: Take 1 Tab by mouth daily. Dispense:  30 Tab     Refill:  5    DISCONTD: cholecalciferol (VITAMIN D3) 1,000 unit cap     Sig: Take 1 Cap by mouth daily. Dispense:  30 Cap     Refill:  5         Additional Notes: Discussed today's diagnosis, treatment plans. Discussed medication indications and side effects. Weight Management: Counseled  After Visit Summary: Discussed provided printed patient instructions. Answered questions accordingly.   Follow-up Disposition: In 1 week to review labs and for Medicare Wellness Exam        Baxter See Abraham Churchill, MPH  Internal Medicine

## 2017-11-14 NOTE — PATIENT INSTRUCTIONS
A Healthy Lifestyle: Care Instructions  Your Care Instructions    A healthy lifestyle can help you feel good, stay at a healthy weight, and have plenty of energy for both work and play. A healthy lifestyle is something you can share with your whole family. A healthy lifestyle also can lower your risk for serious health problems, such as high blood pressure, heart disease, and diabetes. You can follow a few steps listed below to improve your health and the health of your family. Follow-up care is a key part of your treatment and safety. Be sure to make and go to all appointments, and call your doctor if you are having problems. It's also a good idea to know your test results and keep a list of the medicines you take. How can you care for yourself at home? · Do not eat too much sugar, fat, or fast foods. You can still have dessert and treats now and then. The goal is moderation. · Start small to improve your eating habits. Pay attention to portion sizes, drink less juice and soda pop, and eat more fruits and vegetables. ¨ Eat a healthy amount of food. A 3-ounce serving of meat, for example, is about the size of a deck of cards. Fill the rest of your plate with vegetables and whole grains. ¨ Limit the amount of soda and sports drinks you have every day. Drink more water when you are thirsty. ¨ Eat at least 5 servings of fruits and vegetables every day. It may seem like a lot, but it is not hard to reach this goal. A serving or helping is 1 piece of fruit, 1 cup of vegetables, or 2 cups of leafy, raw vegetables. Have an apple or some carrot sticks as an afternoon snack instead of a candy bar. Try to have fruits and/or vegetables at every meal.  · Make exercise part of your daily routine. You may want to start with simple activities, such as walking, bicycling, or slow swimming. Try to be active 30 to 60 minutes every day. You do not need to do all 30 to 60 minutes all at once.  For example, you can exercise 3 times a day for 10 or 20 minutes. Moderate exercise is safe for most people, but it is always a good idea to talk to your doctor before starting an exercise program.  · Keep moving. Carly Simmons the lawn, work in the garden, or [email or web address]. Take the stairs instead of the elevator at work. · If you smoke, quit. People who smoke have an increased risk for heart attack, stroke, cancer, and other lung illnesses. Quitting is hard, but there are ways to boost your chance of quitting tobacco for good. ¨ Use nicotine gum, patches, or lozenges. ¨ Ask your doctor about stop-smoking programs and medicines. ¨ Keep trying. In addition to reducing your risk of diseases in the future, you will notice some benefits soon after you stop using tobacco. If you have shortness of breath or asthma symptoms, they will likely get better within a few weeks after you quit. · Limit how much alcohol you drink. Moderate amounts of alcohol (up to 2 drinks a day for men, 1 drink a day for women) are okay. But drinking too much can lead to liver problems, high blood pressure, and other health problems. Family health  If you have a family, there are many things you can do together to improve your health. · Eat meals together as a family as often as possible. · Eat healthy foods. This includes fruits, vegetables, lean meats and dairy, and whole grains. · Include your family in your fitness plan. Most people think of activities such as jogging or tennis as the way to fitness, but there are many ways you and your family can be more active. Anything that makes you breathe hard and gets your heart pumping is exercise. Here are some tips:  ¨ Walk to do errands or to take your child to school or the bus. ¨ Go for a family bike ride after dinner instead of watching TV. Where can you learn more? Go to http://david-kayla.info/. Enter T360 in the search box to learn more about \"A Healthy Lifestyle: Care Instructions. \"  Current as of: May 12, 2017  Content Version: 11.4  © 5991-7128 [employer]. Care instructions adapted under license by [employer] (which disclaims liability or warranty for this information). If you have questions about a medical condition or this instruction, always ask your healthcare professional. [address] any warranty or liability for your use of this information. DASH Diet: Care Instructions  Your Care Instructions    The DASH diet is an eating plan that can help lower your blood pressure. DASH stands for Dietary Approaches to Stop Hypertension. Hypertension is high blood pressure. The DASH diet focuses on eating foods that are high in calcium, potassium, and magnesium. These nutrients can lower blood pressure. The foods that are highest in these nutrients are fruits, vegetables, low-fat dairy products, nuts, seeds, and legumes. But taking calcium, potassium, and magnesium supplements instead of eating foods that are high in those nutrients does not have the same effect. The DASH diet also includes whole grains, fish, and poultry. The DASH diet is one of several lifestyle changes your doctor may recommend to lower your high blood pressure. Your doctor may also want you to decrease the amount of sodium in your diet. Lowering sodium while following the DASH diet can lower blood pressure even further than just the DASH diet alone. Follow-up care is a key part of your treatment and safety. Be sure to make and go to all appointments, and call your doctor if you are having problems. It's also a good idea to know your test results and keep a list of the medicines you take. How can you care for yourself at home? Following the DASH diet  · Eat 4 to 5 servings of fruit each day. A serving is 1 medium-sized piece of fruit, ½ cup chopped or canned fruit, 1/4 cup dried fruit, or 4 ounces (½ cup) of fruit juice. Choose fruit more often than fruit juice.   · Eat 4 to 5 servings of vegetables each day. A serving is 1 cup of lettuce or raw leafy vegetables, ½ cup of chopped or cooked vegetables, or 4 ounces (½ cup) of vegetable juice. Choose vegetables more often than vegetable juice. · Get 2 to 3 servings of low-fat and fat-free dairy each day. A serving is 8 ounces of milk, 1 cup of yogurt, or 1 ½ ounces of cheese. · Eat 6 to 8 servings of grains each day. A serving is 1 slice of bread, 1 ounce of dry cereal, or ½ cup of cooked rice, pasta, or cooked cereal. Try to choose whole-grain products as much as possible. · Limit lean meat, poultry, and fish to 2 servings each day. A serving is 3 ounces, about the size of a deck of cards. · Eat 4 to 5 servings of nuts, seeds, and legumes (cooked dried beans, lentils, and split peas) each week. A serving is 1/3 cup of nuts, 2 tablespoons of seeds, or ½ cup of cooked beans or peas. · Limit fats and oils to 2 to 3 servings each day. A serving is 1 teaspoon of vegetable oil or 2 tablespoons of salad dressing. · Limit sweets and added sugars to 5 servings or less a week. A serving is 1 tablespoon jelly or jam, ½ cup sorbet, or 1 cup of lemonade. · Eat less than 2,300 milligrams (mg) of sodium a day. If you limit your sodium to 1,500 mg a day, you can lower your blood pressure even more. Tips for success  · Start small. Do not try to make dramatic changes to your diet all at once. You might feel that you are missing out on your favorite foods and then be more likely to not follow the plan. Make small changes, and stick with them. Once those changes become habit, add a few more changes. · Try some of the following:  ¨ Make it a goal to eat a fruit or vegetable at every meal and at snacks. This will make it easy to get the recommended amount of fruits and vegetables each day. ¨ Try yogurt topped with fruit and nuts for a snack or healthy dessert. ¨ Add lettuce, tomato, cucumber, and onion to sandwiches.   ¨ Combine a ready-made pizza crust with low-fat mozzarella cheese and lots of vegetable toppings. Try using tomatoes, squash, spinach, broccoli, carrots, cauliflower, and onions. ¨ Have a variety of cut-up vegetables with a low-fat dip as an appetizer instead of chips and dip. ¨ Sprinkle sunflower seeds or chopped almonds over salads. Or try adding chopped walnuts or almonds to cooked vegetables. ¨ Try some vegetarian meals using beans and peas. Add garbanzo or kidney beans to salads. Make burritos and tacos with mashed barboza beans or black beans. Where can you learn more? Go to http://davidAlacritechkayla.info/. Enter M189 in the search box to learn more about \"DASH Diet: Care Instructions. \"  Current as of: September 21, 2016  Content Version: 11.4  © 5147-0381 [employer]. Care instructions adapted under license by [employer] (which disclaims liability or warranty for this information). If you have questions about a medical condition or this instruction, always ask your healthcare professional. [place] [address] any warranty or liability for your use of this information. Learning About Diabetes Food Guidelines  Your Care Instructions    Meal planning is important to manage diabetes. It helps keep your blood sugar at a target level (which you set with your doctor). You don't have to eat special foods. You can eat what your family eats, including sweets once in a while. But you do have to pay attention to how often you eat and how much you eat of certain foods. You may want to work with a dietitian or a certified diabetes educator (CDE) to help you plan meals and snacks. A dietitian or CDE can also help you lose weight if that is one of your goals. What should you know about eating carbs? Managing the amount of carbohydrate (carbs) you eat is an important part of healthy meals when you have diabetes. Carbohydrate is found in many foods. · Learn which foods have carbs.  And learn the amounts of carbs in different foods. ¨ Bread, cereal, pasta, and rice have about 15 grams of carbs in a serving. A serving is 1 slice of bread (1 ounce), ½ cup of cooked cereal, or 1/3 cup of cooked pasta or rice. ¨ Fruits have 15 grams of carbs in a serving. A serving is 1 small fresh fruit, such as an apple or orange; ½ of a banana; ½ cup of cooked or canned fruit; ½ cup of fruit juice; 1 cup of melon or raspberries; or 2 tablespoons of dried fruit. ¨ Milk and no-sugar-added yogurt have 15 grams of carbs in a serving. A serving is 1 cup of milk or 2/3 cup of no-sugar-added yogurt. ¨ Starchy vegetables have 15 grams of carbs in a serving. A serving is ½ cup of mashed potatoes or sweet potato; 1 cup winter squash; ½ of a small baked potato; ½ cup of cooked beans; or ½ cup cooked corn or green peas. · Learn how much carbs to eat each day and at each meal. A dietitian or CDE can teach you how to keep track of the amount of carbs you eat. This is called carbohydrate counting. · If you are not sure how to count carbohydrate grams, use the Plate Method to plan meals. It is a good, quick way to make sure that you have a balanced meal. It also helps you spread carbs throughout the day. ¨ Divide your plate by types of foods. Put non-starchy vegetables on half the plate, meat or other protein food on one-quarter of the plate, and a grain or starchy vegetable in the final quarter of the plate. To this you can add a small piece of fruit and 1 cup of milk or yogurt, depending on how many carbs you are supposed to eat at a meal.  · Try to eat about the same amount of carbs at each meal. Do not \"save up\" your daily allowance of carbs to eat at one meal.  · Proteins have very little or no carbs per serving. Examples of proteins are beef, chicken, turkey, fish, eggs, tofu, cheese, cottage cheese, and peanut butter. A serving size of meat is 3 ounces, which is about the size of a deck of cards.  Examples of meat substitute serving sizes (equal to 1 ounce of meat) are 1/4 cup of cottage cheese, 1 egg, 1 tablespoon of peanut butter, and ½ cup of tofu. How can you eat out and still eat healthy? · Learn to estimate the serving sizes of foods that have carbohydrate. If you measure food at home, it will be easier to estimate the amount in a serving of restaurant food. · If the meal you order has too much carbohydrate (such as potatoes, corn, or baked beans), ask to have a low-carbohydrate food instead. Ask for a salad or green vegetables. · If you use insulin, check your blood sugar before and after eating out to help you plan how much to eat in the future. · If you eat more carbohydrate at a meal than you had planned, take a walk or do other exercise. This will help lower your blood sugar. What else should you know? · Limit saturated fat, such as the fat from meat and dairy products. This is a healthy choice because people who have diabetes are at higher risk of heart disease. So choose lean cuts of meat and nonfat or low-fat dairy products. Use olive or canola oil instead of butter or shortening when cooking. · Don't skip meals. Your blood sugar may drop too low if you skip meals and take insulin or certain medicines for diabetes. · Check with your doctor before you drink alcohol. Alcohol can cause your blood sugar to drop too low. Alcohol can also cause a bad reaction if you take certain diabetes medicines. Follow-up care is a key part of your treatment and safety. Be sure to make and go to all appointments, and call your doctor if you are having problems. It's also a good idea to know your test results and keep a list of the medicines you take. Where can you learn more? Go to http://david-kayla.info/. Enter K020 in the search box to learn more about \"Learning About Diabetes Food Guidelines. \"  Current as of: March 13, 2017  Content Version: 11.4  © 6413-2924 [employer], [facility].  Care instructions adapted under license by [employer] (which disclaims liability or warranty for this information). If you have questions about a medical condition or this instruction, always ask your healthcare professional. [address] any warranty or liability for your use of this information.

## 2017-11-14 NOTE — MR AVS SNAPSHOT
Visit Information Date & Time Provider Department Dept. Phone Encounter #  
 11/14/2017  2:00 PM Sona YepezPer[facility] [address] [facility] [phone] [id number] Follow-up Instructions Return in about 1 week (around 11/21/2017) for to review labs and Medicare Wellness Exam.  
  
Your Appointments 4/25/2018 11:30 AM  
ESTABLISHED PATIENT with Cherelle Acuna MD  
[facility] Appt Note: RTC in 6 months with Dr. Branden Cazares  with SD psa  
 [address], [place] [address] [facility] [address]  
  
   
 [address], [address] [facility] [place] [address] Upcoming Health Maintenance Date Due  
 GLAUCOMA SCREENING Q2Y 4/23/2012 MEDICARE YEARLY EXAM 4/23/2012 EYE EXAM RETINAL OR DILATED Q1 3/3/2015 MICROALBUMIN Q1 11/2/2017 HEMOGLOBIN A1C Q6M 12/12/2017 FOOT EXAM Q1 6/12/2018 LIPID PANEL Q1 6/12/2018 COLONOSCOPY 11/21/2021 DTaP/Tdap/Td series (2 - Td) 11/14/2027 Allergies as of 11/14/2017  Review Complete On: 11/14/2017 By: Sona Yepez DO No Known Allergies Current Immunizations  Reviewed on 7/12/2012 Name Date ZZZ-RETIRED (DO NOT USE) Pneumococcal Vaccine (Unspecified Type) 7/12/2012 Not reviewed this visit You Were Diagnosed With   
  
 Codes Comments Type 2 diabetes mellitus with hyperglycemia, without long-term current use of insulin (HCC)    -  Primary ICD-10-CM: E11.65 ICD-9-CM: 250.00, 790.29 Essential hypertension     ICD-10-CM: I10 
ICD-9-CM: 401.9 Pure hypercholesterolemia     ICD-10-CM: E78.00 ICD-9-CM: 272.0 Vitamin D deficiency     ICD-10-CM: E55.9 ICD-9-CM: 268.9 Obesity (BMI 30.0-34.9)     ICD-10-CM: E64.2 ICD-9-CM: 278.00 Vitals BP Pulse Temp Resp Height(growth percentile) Weight(growth percentile)  140/90 (BP 1 Location: Right arm, BP Patient Position: Sitting) 87 97.9 °F (36.6 °C) (Oral) 16 5' 4\" (1.626 m) 185 lb 6.4 oz (84.1 kg) SpO2 BMI Smoking Status 96% 31.82 kg/m2 Never Smoker Vitals History BMI and BSA Data Body Mass Index Body Surface Area  
 31.82 kg/m 2 1.95 m 2 Preferred Pharmacy Pharmacy Name Phone [address] [place] [address] [phone] Your Updated Medication List  
  
   
This list is accurate as of: 11/14/17  2:27 PM.  Always use your most recent med list.  
  
  
  
  
 atorvastatin 80 mg tablet Commonly known as:  LIPITOR Take 1 Tab by mouth daily. cholecalciferol 1,000 unit Cap Commonly known as:  VITAMIN D3 Take 1 Cap by mouth daily. clopidogrel 75 mg Tab Commonly known as:  PLAVIX  
  
 hydroCHLOROthiazide 25 mg tablet Commonly known as:  HYDRODIURIL Take 1 Tab by mouth daily. losartan 100 mg tablet Commonly known as:  COZAAR Take 1 Tab by mouth daily. metFORMIN 500 mg tablet Commonly known as:  GLUCOPHAGE Take 1 Tab by mouth two (2) times daily (with meals). Prescriptions Printed Refills  
 metFORMIN (GLUCOPHAGE) 500 mg tablet 5 Sig: Take 1 Tab by mouth two (2) times daily (with meals). Class: Print Route: Oral  
 atorvastatin (LIPITOR) 80 mg tablet 5 Sig: Take 1 Tab by mouth daily. Class: Print Route: Oral  
 hydroCHLOROthiazide (HYDRODIURIL) 25 mg tablet 5 Sig: Take 1 Tab by mouth daily. Class: Print Route: Oral  
 losartan (COZAAR) 100 mg tablet 5 Sig: Take 1 Tab by mouth daily. Class: Print Route: Oral  
 cholecalciferol (VITAMIN D3) 1,000 unit cap 5 Sig: Take 1 Cap by mouth daily. Class: Print Route: Oral  
  
Follow-up Instructions Return in about 1 week (around 11/21/2017) for to review labs and Medicare Wellness Exam. To-Do List   
 11/14/2017 Lab:  CBC WITH AUTOMATED DIFF   
  
 11/14/2017   Lab:  HEMOGLOBIN A1C WITH EAG   
  
 11/14/2017 Lab:  LIPID PANEL   
  
 11/14/2017 Lab:  METABOLIC PANEL, COMPREHENSIVE   
  
 11/14/2017 Lab:  MICROALBUMIN, UR, RAND W/ MICROALBUMIN/CREA RATIO   
  
 11/14/2017 Lab:  T4, FREE   
  
 11/14/2017 Lab:  TSH 3RD GENERATION   
  
 11/14/2017 Lab:  VITAMIN D, 25 HYDROXY Patient Instructions A Healthy Lifestyle: Care Instructions Your Care Instructions A healthy lifestyle can help you feel good, stay at a healthy weight, and have plenty of energy for both work and play. A healthy lifestyle is something you can share with your whole family. A healthy lifestyle also can lower your risk for serious health problems, such as high blood pressure, heart disease, and diabetes. You can follow a few steps listed below to improve your health and the health of your family. Follow-up care is a key part of your treatment and safety. Be sure to make and go to all appointments, and call your doctor if you are having problems. It's also a good idea to know your test results and keep a list of the medicines you take. How can you care for yourself at home? · Do not eat too much sugar, fat, or fast foods. You can still have dessert and treats now and then. The goal is moderation. · Start small to improve your eating habits. Pay attention to portion sizes, drink less juice and soda pop, and eat more fruits and vegetables. ¨ Eat a healthy amount of food. A 3-ounce serving of meat, for example, is about the size of a deck of cards. Fill the rest of your plate with vegetables and whole grains. ¨ Limit the amount of soda and sports drinks you have every day. Drink more water when you are thirsty. ¨ Eat at least 5 servings of fruits and vegetables every day. It may seem like a lot, but it is not hard to reach this goal. A serving or helping is 1 piece of fruit, 1 cup of vegetables, or 2 cups of leafy, raw vegetables. Have an apple or some carrot sticks as an afternoon snack instead of a candy bar. Try to have fruits and/or vegetables at every meal. 
· Make exercise part of your daily routine. You may want to start with simple activities, such as walking, bicycling, or slow swimming. Try to be active 30 to 60 minutes every day. You do not need to do all 30 to 60 minutes all at once. For example, you can exercise 3 times a day for 10 or 20 minutes. Moderate exercise is safe for most people, but it is always a good idea to talk to your doctor before starting an exercise program. 
· Keep moving. Evaristo Gonzalezor the lawn, work in the garden, or [employer]. Take the stairs instead of the elevator at work. · If you smoke, quit. People who smoke have an increased risk for heart attack, stroke, cancer, and other lung illnesses. Quitting is hard, but there are ways to boost your chance of quitting tobacco for good. ¨ Use nicotine gum, patches, or lozenges. ¨ Ask your doctor about stop-smoking programs and medicines. ¨ Keep trying. In addition to reducing your risk of diseases in the future, you will notice some benefits soon after you stop using tobacco. If you have shortness of breath or asthma symptoms, they will likely get better within a few weeks after you quit. · Limit how much alcohol you drink. Moderate amounts of alcohol (up to 2 drinks a day for men, 1 drink a day for women) are okay. But drinking too much can lead to liver problems, high blood pressure, and other health problems. Family health If you have a family, there are many things you can do together to improve your health. · Eat meals together as a family as often as possible. · Eat healthy foods. This includes fruits, vegetables, lean meats and dairy, and whole grains. · Include your family in your fitness plan.  Most people think of activities such as jogging or tennis as the way to fitness, but there are many ways you and your family can be more active. Anything that makes you breathe hard and gets your heart pumping is exercise. Here are some tips: 
¨ Walk to do errands or to take your child to school or the bus. ¨ Go for a family bike ride after dinner instead of watching TV. Where can you learn more? Go to http://david-kayla.info/. Enter Q116 in the search box to learn more about \"A Healthy Lifestyle: Care Instructions. \" Current as of: May 12, 2017 Content Version: 11.4 © 4445-2180 [employer] 2000. Care instructions adapted under license by [employer] (which disclaims liability or warranty for this information). If you have questions about a medical condition or this instruction, always ask your healthcare professional. [address] any warranty or liability for your use of this information. DASH Diet: Care Instructions Your Care Instructions The DASH diet is an eating plan that can help lower your blood pressure. DASH stands for Dietary Approaches to Stop Hypertension. Hypertension is high blood pressure. The DASH diet focuses on eating foods that are high in calcium, potassium, and magnesium. These nutrients can lower blood pressure. The foods that are highest in these nutrients are fruits, vegetables, low-fat dairy products, nuts, seeds, and legumes. But taking calcium, potassium, and magnesium supplements instead of eating foods that are high in those nutrients does not have the same effect. The DASH diet also includes whole grains, fish, and poultry. The DASH diet is one of several lifestyle changes your doctor may recommend to lower your high blood pressure. Your doctor may also want you to decrease the amount of sodium in your diet. Lowering sodium while following the DASH diet can lower blood pressure even further than just the DASH diet alone. Follow-up care is a key part of your treatment and safety.  Be sure to make and go to all appointments, and call your doctor if you are having problems. It's also a good idea to know your test results and keep a list of the medicines you take. How can you care for yourself at home? Following the DASH diet · Eat 4 to 5 servings of fruit each day. A serving is 1 medium-sized piece of fruit, ½ cup chopped or canned fruit, 1/4 cup dried fruit, or 4 ounces (½ cup) of fruit juice. Choose fruit more often than fruit juice. · Eat 4 to 5 servings of vegetables each day. A serving is 1 cup of lettuce or raw leafy vegetables, ½ cup of chopped or cooked vegetables, or 4 ounces (½ cup) of vegetable juice. Choose vegetables more often than vegetable juice. · Get 2 to 3 servings of low-fat and fat-free dairy each day. A serving is 8 ounces of milk, 1 cup of yogurt, or 1 ½ ounces of cheese. · Eat 6 to 8 servings of grains each day. A serving is 1 slice of bread, 1 ounce of dry cereal, or ½ cup of cooked rice, pasta, or cooked cereal. Try to choose whole-grain products as much as possible. · Limit lean meat, poultry, and fish to 2 servings each day. A serving is 3 ounces, about the size of a deck of cards. · Eat 4 to 5 servings of nuts, seeds, and legumes (cooked dried beans, lentils, and split peas) each week. A serving is 1/3 cup of nuts, 2 tablespoons of seeds, or ½ cup of cooked beans or peas. · Limit fats and oils to 2 to 3 servings each day. A serving is 1 teaspoon of vegetable oil or 2 tablespoons of salad dressing. · Limit sweets and added sugars to 5 servings or less a week. A serving is 1 tablespoon jelly or jam, ½ cup sorbet, or 1 cup of lemonade. · Eat less than 2,300 milligrams (mg) of sodium a day. If you limit your sodium to 1,500 mg a day, you can lower your blood pressure even more. Tips for success · Start small. Do not try to make dramatic changes to your diet all at once.  You might feel that you are missing out on your favorite foods and then be more likely to not follow the plan. Make small changes, and stick with them. Once those changes become habit, add a few more changes. · Try some of the following: ¨ Make it a goal to eat a fruit or vegetable at every meal and at snacks. This will make it easy to get the recommended amount of fruits and vegetables each day. ¨ Try yogurt topped with fruit and nuts for a snack or healthy dessert. ¨ Add lettuce, tomato, cucumber, and onion to sandwiches. ¨ Combine a ready-made pizza crust with low-fat mozzarella cheese and lots of vegetable toppings. Try using tomatoes, squash, spinach, broccoli, carrots, cauliflower, and onions. ¨ Have a variety of cut-up vegetables with a low-fat dip as an appetizer instead of chips and dip. ¨ Sprinkle sunflower seeds or chopped almonds over salads. Or try adding chopped walnuts or almonds to cooked vegetables. ¨ Try some vegetarian meals using beans and peas. Add garbanzo or kidney beans to salads. Make burritos and tacos with mashed barboza beans or black beans. Where can you learn more? Go to http://david-kayla.info/. Enter A304 in the search box to learn more about \"DASH Diet: Care Instructions. \" Current as of: September 21, 2016 Content Version: 11.4 © 7497-1246 [employer]. Care instructions adapted under license by [employer] (which disclaims liability or warranty for this information). If you have questions about a medical condition or this instruction, always ask your healthcare professional. [place] [address] any warranty or liability for your use of this information. Learning About Diabetes Food Guidelines Your Care Instructions Meal planning is important to manage diabetes. It helps keep your blood sugar at a target level (which you set with your doctor). You don't have to eat special foods.  You can eat what your family eats, including sweets once in a while. But you do have to pay attention to how often you eat and how much you eat of certain foods. You may want to work with a dietitian or a certified diabetes educator (CDE) to help you plan meals and snacks. A dietitian or CDE can also help you lose weight if that is one of your goals. What should you know about eating carbs? Managing the amount of carbohydrate (carbs) you eat is an important part of healthy meals when you have diabetes. Carbohydrate is found in many foods. · Learn which foods have carbs. And learn the amounts of carbs in different foods. ¨ Bread, cereal, pasta, and rice have about 15 grams of carbs in a serving. A serving is 1 slice of bread (1 ounce), ½ cup of cooked cereal, or 1/3 cup of cooked pasta or rice. ¨ Fruits have 15 grams of carbs in a serving. A serving is 1 small fresh fruit, such as an apple or orange; ½ of a banana; ½ cup of cooked or canned fruit; ½ cup of fruit juice; 1 cup of melon or raspberries; or 2 tablespoons of dried fruit. ¨ Milk and no-sugar-added yogurt have 15 grams of carbs in a serving. A serving is 1 cup of milk or 2/3 cup of no-sugar-added yogurt. ¨ Starchy vegetables have 15 grams of carbs in a serving. A serving is ½ cup of mashed potatoes or sweet potato; 1 cup winter squash; ½ of a small baked potato; ½ cup of cooked beans; or ½ cup cooked corn or green peas. · Learn how much carbs to eat each day and at each meal. A dietitian or CDE can teach you how to keep track of the amount of carbs you eat. This is called carbohydrate counting. · If you are not sure how to count carbohydrate grams, use the Plate Method to plan meals. It is a good, quick way to make sure that you have a balanced meal. It also helps you spread carbs throughout the day. ¨ Divide your plate by types of foods.  Put non-starchy vegetables on half the plate, meat or other protein food on one-quarter of the plate, and a grain or starchy vegetable in the final quarter of the plate. To this you can add a small piece of fruit and 1 cup of milk or yogurt, depending on how many carbs you are supposed to eat at a meal. 
· Try to eat about the same amount of carbs at each meal. Do not \"save up\" your daily allowance of carbs to eat at one meal. 
· Proteins have very little or no carbs per serving. Examples of proteins are beef, chicken, turkey, fish, eggs, tofu, cheese, cottage cheese, and peanut butter. A serving size of meat is 3 ounces, which is about the size of a deck of cards. Examples of meat substitute serving sizes (equal to 1 ounce of meat) are 1/4 cup of cottage cheese, 1 egg, 1 tablespoon of peanut butter, and ½ cup of tofu. How can you eat out and still eat healthy? · Learn to estimate the serving sizes of foods that have carbohydrate. If you measure food at home, it will be easier to estimate the amount in a serving of restaurant food. · If the meal you order has too much carbohydrate (such as potatoes, corn, or baked beans), ask to have a low-carbohydrate food instead. Ask for a salad or green vegetables. · If you use insulin, check your blood sugar before and after eating out to help you plan how much to eat in the future. · If you eat more carbohydrate at a meal than you had planned, take a walk or do other exercise. This will help lower your blood sugar. What else should you know? · Limit saturated fat, such as the fat from meat and dairy products. This is a healthy choice because people who have diabetes are at higher risk of heart disease. So choose lean cuts of meat and nonfat or low-fat dairy products. Use olive or canola oil instead of butter or shortening when cooking. · Don't skip meals. Your blood sugar may drop too low if you skip meals and take insulin or certain medicines for diabetes. · Check with your doctor before you drink alcohol.  Alcohol can cause your blood sugar to drop too low. Alcohol can also cause a bad reaction if you take certain diabetes medicines. Follow-up care is a key part of your treatment and safety. Be sure to make and go to all appointments, and call your doctor if you are having problems. It's also a good idea to know your test results and keep a list of the medicines you take. Where can you learn more? Go to http://david-kayla.info/. Enter R638 in the search box to learn more about \"Learning About Diabetes Food Guidelines. \" Current as of: March 13, 2017 Content Version: 11.4 © 3522-0143 [employer]. Care instructions adapted under license by [employer] (which disclaims liability or warranty for this information). If you have questions about a medical condition or this instruction, always ask your healthcare professional. [address] any warranty or liability for your use of this information. Introducing [facility]! Kacy Cuevas introduces [employer] patient portal. Now you can access parts of your medical record, email your doctor's office, and request medication refills online. 1. In your internet browser, go to [email or web address] 2. Click on the First Time User? Click Here link in the Sign In box. You will see the New Member Sign Up page. 3. Enter your [employer]ce Access Code exactly as it appears below. You will not need to use this code after youve completed the sign-up process. If you do not sign up before the expiration date, you must request a new code. · [employer] Access Code: [email or web address] Expires: 1/8/2018  1:50 PM 
 
4. Enter the last four digits of your Social Security Number (xxxx) and Date of Birth (mm/dd/yyyy) as indicated and click Submit. You will be taken to the next sign-up page. 5. Create a Plyce ID. This will be your [employer]ce login ID and cannot be changed, so think of one that is secure and easy to remember. 6. Create a [employer] password. You can change your password at any time. 7. Enter your Password Reset Question and Answer. This can be used at a later time if you forget your password. 8. Enter your e-mail address. You will receive e-mail notification when new information is available in [address] 9. Click Sign Up. You can now view and download portions of your medical record. 10. Click the Download Summary menu link to download a portable copy of your medical information. If you have questions, please visit the Frequently Asked Questions section of the [employer] website. Remember, [employer] is NOT to be used for urgent needs. For medical emergencies, dial 911. Now available from your iPhone and Android! Please provide this summary of care documentation to your next provider. Your primary care clinician is listed as Grace Pastrana. If you have any questions after today's visit, please call [phone].

## 2017-11-14 NOTE — PROGRESS NOTES
Chief Complaint   Patient presents with    Medication Refill   Patient is here today for medication refill. Pt would like a referral for [facility] and Eye doctor due to [employer]. 1. Have you been to the ER, urgent care clinic since your last visit? Hospitalized since your last visit? No    2. Have you seen or consulted any other health care providers outside of the [facility] since your last visit? Include any pap smears or colon screening.  No

## 2017-11-15 LAB
25(OH)D3 SERPL-MCNC: 30.5 NG/ML (ref 30–100)
CREAT UR-MCNC: 188.7 MG/DL (ref 30–125)
MICROALBUMIN UR-MCNC: 9.5 MG/DL (ref 0–3)
MICROALBUMIN/CREAT UR-RTO: 50 MG/G (ref 0–30)

## 2017-11-21 ENCOUNTER — APPOINTMENT (OUTPATIENT)
Dept: GENERAL RADIOLOGY | Age: 72
End: 2017-11-21
Attending: PHYSICIAN ASSISTANT
Payer: MEDICARE

## 2017-11-21 ENCOUNTER — HOSPITAL ENCOUNTER (EMERGENCY)
Age: 72
Discharge: HOME | End: 2017-11-21
Attending: EMERGENCY MEDICINE
Payer: MEDICARE

## 2017-11-21 ENCOUNTER — TELEPHONE (OUTPATIENT)
Dept: FAMILY MEDICINE CLINIC | Age: 72
End: 2017-11-21

## 2017-11-21 ENCOUNTER — OFFICE VISIT (OUTPATIENT)
Dept: FAMILY MEDICINE CLINIC | Age: 72
End: 2017-11-21

## 2017-11-21 VITALS
OXYGEN SATURATION: 96 % | WEIGHT: 182.8 LBS | DIASTOLIC BLOOD PRESSURE: 70 MMHG | BODY MASS INDEX: 31.21 KG/M2 | RESPIRATION RATE: 16 BRPM | SYSTOLIC BLOOD PRESSURE: 120 MMHG | TEMPERATURE: 98.3 F | HEIGHT: 64 IN | HEART RATE: 104 BPM

## 2017-11-21 VITALS
WEIGHT: 182 LBS | DIASTOLIC BLOOD PRESSURE: 72 MMHG | HEART RATE: 82 BPM | OXYGEN SATURATION: 96 % | BODY MASS INDEX: 31.24 KG/M2 | SYSTOLIC BLOOD PRESSURE: 148 MMHG | TEMPERATURE: 98.6 F | RESPIRATION RATE: 16 BRPM

## 2017-11-21 DIAGNOSIS — R55 NEAR SYNCOPE: ICD-10-CM

## 2017-11-21 DIAGNOSIS — I63.9 CEREBROVASCULAR ACCIDENT (CVA), UNSPECIFIED MECHANISM (HCC): ICD-10-CM

## 2017-11-21 DIAGNOSIS — E86.0 DEHYDRATION: ICD-10-CM

## 2017-11-21 DIAGNOSIS — R79.89 AZOTEMIA: ICD-10-CM

## 2017-11-21 DIAGNOSIS — R42 DIZZINESS: ICD-10-CM

## 2017-11-21 DIAGNOSIS — Z00.00 WELL ADULT EXAM: Primary | ICD-10-CM

## 2017-11-21 DIAGNOSIS — R55 NEAR SYNCOPE: Primary | ICD-10-CM

## 2017-11-21 DIAGNOSIS — R50.9 FEBRILE ILLNESS: ICD-10-CM

## 2017-11-21 LAB
ANION GAP SERPL CALC-SCNC: 7 MMOL/L (ref 3–18)
APPEARANCE UR: CLEAR
ATRIAL RATE: 84 BPM
BASOPHILS # BLD: 0 K/UL (ref 0–0.06)
BASOPHILS NFR BLD: 1 % (ref 0–2)
BILIRUB UR QL: NEGATIVE
BUN SERPL-MCNC: 28 MG/DL (ref 7–18)
BUN/CREAT SERPL: 13 (ref 12–20)
CALCIUM SERPL-MCNC: 9.3 MG/DL (ref 8.5–10.1)
CALCULATED P AXIS, ECG09: 51 DEGREES
CALCULATED R AXIS, ECG10: 23 DEGREES
CALCULATED T AXIS, ECG11: 107 DEGREES
CHLORIDE SERPL-SCNC: 99 MMOL/L (ref 100–108)
CK MB CFR SERPL CALC: NORMAL % (ref 0–4)
CK MB SERPL-MCNC: <1 NG/ML (ref 5–25)
CK SERPL-CCNC: 98 U/L (ref 39–308)
CO2 SERPL-SCNC: 34 MMOL/L (ref 21–32)
COLOR UR: YELLOW
CREAT SERPL-MCNC: 2.12 MG/DL (ref 0.6–1.3)
DIAGNOSIS, 93000: NORMAL
DIFFERENTIAL METHOD BLD: ABNORMAL
EOSINOPHIL # BLD: 0 K/UL (ref 0–0.4)
EOSINOPHIL NFR BLD: 0 % (ref 0–5)
ERYTHROCYTE [DISTWIDTH] IN BLOOD BY AUTOMATED COUNT: 14.1 % (ref 11.6–14.5)
GLUCOSE SERPL-MCNC: 177 MG/DL (ref 74–99)
GLUCOSE UR STRIP.AUTO-MCNC: NEGATIVE MG/DL
HCT VFR BLD AUTO: 47.7 % (ref 36–48)
HGB BLD-MCNC: 15.5 G/DL (ref 13–16)
HGB UR QL STRIP: NEGATIVE
KETONES UR QL STRIP.AUTO: NEGATIVE MG/DL
LACTATE BLD-SCNC: 2.3 MMOL/L (ref 0.4–2)
LEUKOCYTE ESTERASE UR QL STRIP.AUTO: NEGATIVE
LYMPHOCYTES # BLD: 1.1 K/UL (ref 0.9–3.6)
LYMPHOCYTES NFR BLD: 36 % (ref 21–52)
MCH RBC QN AUTO: 29.4 PG (ref 24–34)
MCHC RBC AUTO-ENTMCNC: 32.5 G/DL (ref 31–37)
MCV RBC AUTO: 90.3 FL (ref 74–97)
MONOCYTES # BLD: 0.5 K/UL (ref 0.05–1.2)
MONOCYTES NFR BLD: 15 % (ref 3–10)
NEUTS SEG # BLD: 1.5 K/UL (ref 1.8–8)
NEUTS SEG NFR BLD: 48 % (ref 40–73)
NITRITE UR QL STRIP.AUTO: NEGATIVE
P-R INTERVAL, ECG05: 218 MS
PH UR STRIP: 5 [PH] (ref 5–8)
PLATELET # BLD AUTO: 156 K/UL (ref 135–420)
PMV BLD AUTO: 11.2 FL (ref 9.2–11.8)
POTASSIUM SERPL-SCNC: 3.5 MMOL/L (ref 3.5–5.5)
PROT UR STRIP-MCNC: NEGATIVE MG/DL
Q-T INTERVAL, ECG07: 350 MS
QRS DURATION, ECG06: 82 MS
QTC CALCULATION (BEZET), ECG08: 413 MS
RBC # BLD AUTO: 5.28 M/UL (ref 4.7–5.5)
SODIUM SERPL-SCNC: 140 MMOL/L (ref 136–145)
SP GR UR REFRACTOMETRY: 1.01 (ref 1–1.03)
TROPONIN I SERPL-MCNC: <0.02 NG/ML (ref 0–0.06)
UROBILINOGEN UR QL STRIP.AUTO: 0.2 EU/DL (ref 0.2–1)
VENTRICULAR RATE, ECG03: 84 BPM
WBC # BLD AUTO: 3.1 K/UL (ref 4.6–13.2)

## 2017-11-21 PROCEDURE — 96361 HYDRATE IV INFUSION ADD-ON: CPT

## 2017-11-21 PROCEDURE — 99285 EMERGENCY DEPT VISIT HI MDM: CPT

## 2017-11-21 PROCEDURE — 93005 ELECTROCARDIOGRAM TRACING: CPT

## 2017-11-21 PROCEDURE — 85025 COMPLETE CBC W/AUTO DIFF WBC: CPT | Performed by: EMERGENCY MEDICINE

## 2017-11-21 PROCEDURE — 81003 URINALYSIS AUTO W/O SCOPE: CPT | Performed by: PHYSICIAN ASSISTANT

## 2017-11-21 PROCEDURE — 80048 BASIC METABOLIC PNL TOTAL CA: CPT | Performed by: EMERGENCY MEDICINE

## 2017-11-21 PROCEDURE — 96360 HYDRATION IV INFUSION INIT: CPT

## 2017-11-21 PROCEDURE — 74011250636 HC RX REV CODE- 250/636: Performed by: EMERGENCY MEDICINE

## 2017-11-21 PROCEDURE — 82550 ASSAY OF CK (CPK): CPT | Performed by: EMERGENCY MEDICINE

## 2017-11-21 PROCEDURE — 71020 XR CHEST PA LAT: CPT

## 2017-11-21 PROCEDURE — 83605 ASSAY OF LACTIC ACID: CPT

## 2017-11-21 RX ORDER — CLOPIDOGREL BISULFATE 75 MG/1
75 TABLET ORAL DAILY
Qty: 90 TAB | Refills: 0 | Status: SHIPPED | OUTPATIENT
Start: 2017-11-21 | End: 2018-03-06 | Stop reason: SDUPTHER

## 2017-11-21 RX ORDER — SODIUM CHLORIDE 9 MG/ML
1000 INJECTION, SOLUTION INTRAVENOUS ONCE
Status: COMPLETED | OUTPATIENT
Start: 2017-11-21 | End: 2017-11-21

## 2017-11-21 RX ADMIN — SODIUM CHLORIDE 1000 ML: 9 INJECTION, SOLUTION INTRAVENOUS at 12:51

## 2017-11-21 NOTE — ED NOTES
Giorgio Zavala is a 81 y.o. male that was discharged in stable. Pt was accompanied by daughter. Pt is not driving. The patients diagnosis, condition and treatment were explained to  patient and aftercare instructions were given. The patient verbalized understanding. Patient armband removed and shredded.

## 2017-11-21 NOTE — ED PROVIDER NOTES
HPI Comments: 12:10 PM: Michael Foster is a 81y.o. year old male with hx of CAD, DM and CVA presenting to the ED via EMT with c/o singular near syncopal episode onset 1 hour while at Dr. Cale Bagley office for annual exam.  Pt states having diarrhea during near syncopal episode but unsure of color. Wife states pt has had fever, chills, generalized weakness, and sore throat for several days. Denies rhinorrhea or cough. Denies ASA allergy. Denies heart stents. Denies recent dental work. Pt is non-smoker. The history is provided by the patient and the spouse. Past Medical History:   Diagnosis Date    BPH (benign prostatic hypertrophy) with urinary obstruction     CAD (coronary artery disease) 6-21-02 8-5-06 [place] ST: +inferolat ischemic    CVA 3-17-06    acute CVA, L [place] w/ mild R sided weakness    Diabetes ([facility]     Diabetes mellitus ([facility] 7/12/2012    ED (erectile dysfunction) 12-22-08    Elevated PSA 11/21/2011    Erectile dysfunction due to arterial insufficiency     Erectile dysfunction of organic origin     Essential hypertension     Heartburn 12-4-03    Hemorrhoids     Hypercholesterolemia 7-15-08    TSH 0.88    Hyperglycemia     Hyperlipidemia     Hypertension 6-22-01    MILD    Obstructive sleep apnea (adult) (pediatric) 2-2009    Prediabetes 8/18/2010    Prostate cancer ([facility] 03/25/2016    Darvin 7 in 1/12 cores and Darvin 6 in 2/12 cores. PSA 6.0ng/mL. TRUS Volume 143 grams    Psoriasis     Rising PSA level     Rotator cuff tear, right     followed by Dr[facility] Santiago Fernandez ([facility])    Seborrheic dermatitis     Shoulder pain, right 1/27/2012    Stroke [facility])     FIDEL (stress urinary incontinence), male     Vitamin D deficiency 11/25/2011       Past Surgical History:   Procedure Laterality Date    HX KNEE ARTHROSCOPY      HX PROSTATECTOMY  11/16/2016    Robotic-assisted laparoscopic radical prostatectomy. Robotic-assisted laparoscopic bilateral pelvic lymph node dissection  .Dissection was very difficult, bladder neck repair/anastamosis complex and time for this was >50% of time ordinarily required for this procedure.  ME COLONOSCOPY FLX DX W/COLLJ SPEC WHEN PFRMD  4-30-01    papules/ Christiano    ME COLONOSCOPY FLX DX W/COLLJ SPEC WHEN [facility]MD      normal . Dr. Abisai Catalan         Family History:   Problem Relation Age of Onset    Other Brother      vocal cord polyps    Cancer Brother      throat    Heart Disease Brother     Heart Attack Brother     Cancer Mother      leukemia       Social History     Social History    Marital status:      Spouse name: N/A    Number of children: N/A    Years of education: N/A     Occupational History    Not on file. Social History Main Topics    Smoking status: Never Smoker    Smokeless tobacco: Never Used    Alcohol use 0.5 - 1.0 oz/week     1 - 2 Cans of beer per week      Comment: Socially    Drug use: No    Sexual activity: Not Currently     Other Topics Concern    Not on file     Social History Narrative    ** Merged History Encounter **              ALLERGIES: Review of patient's allergies indicates no known allergies. Review of Systems   Constitutional: Positive for fatigue and fever. Negative for chills. HENT: Negative for sore throat. Respiratory: Negative for shortness of breath. Cardiovascular: Negative for chest pain. Gastrointestinal: Negative for diarrhea and vomiting. Skin: Negative for rash. Neurological: Positive for weakness and light-headedness. Negative for headaches. Vitals:    11/21/17 1300 11/21/17 1400 11/21/17 1500 11/21/17 1600   BP: 148/74 163/84 128/76 148/72   Pulse: 79 84 99 82   Resp: 19 18 20 16   Temp:       SpO2: 95% 95% 95% 96%   Weight:                Physical Exam   Constitutional: He is oriented to person, place, and time. He appears well-developed and well-nourished. No distress. HENT:   Head: Normocephalic and atraumatic.    Mouth/Throat: No oropharyngeal exudate. TMs ok. OP w/o exudate. Eyes: No scleral icterus. Cardiovascular: Normal rate, regular rhythm and normal heart sounds. Pulmonary/Chest: Effort normal and breath sounds normal. No respiratory distress. He has no wheezes. He has no rales. Abdominal: Soft. There is no tenderness. There is no rebound and no guarding. Neurological: He is alert and oriented to person, place, and time. No cranial nerve deficit. Coordination normal.   Skin: Skin is warm and dry. Psychiatric: He has a normal mood and affect. Nursing note and vitals reviewed. MDM  Number of Diagnoses or Management Options  Azotemia:   Dehydration:   Febrile illness:   Near syncope:   Diagnosis management comments: Imp: Near syncope. History of fever and malaise. Labs w/ azotemia and elevated BUN/Cr relative to baseline. Feels better after IVF 1 L NS. No focus for sepsis on clinical exam. Sluight elevation in lactic w/ low normal WBC and no fever here. Likely has nonspecific viral infection and component of dehydration. Clinically stable for outpt at this time.     ED Course       Procedures           Vitals:  Patient Vitals for the past 12 hrs:   Temp Pulse Resp BP SpO2   11/21/17 1600 - 82 16 148/72 96 %   11/21/17 1500 - 99 20 128/76 95 %   11/21/17 1400 - 84 18 163/84 95 %   11/21/17 1300 - 79 19 148/74 95 %   11/21/17 1200 - 83 18 132/77 95 %   11/21/17 1137 98.6 °F (37 °C) 89 16 125/66 96 %       Medications Ordered:  Medications   0.9% sodium chloride infusion 1,000 mL (0 mL IntraVENous IV Completed 11/21/17 1500)       Lab Findings:  Recent Results (from the past 12 hour(s))   EKG, 12 LEAD, INITIAL    Collection Time: 11/21/17 11:54 AM   Result Value Ref Range    Ventricular Rate 84 BPM    Atrial Rate 84 BPM    P-R Interval 218 ms    QRS Duration 82 ms    Q-T Interval 350 ms    QTC Calculation (Bezet) 413 ms    Calculated P Axis 51 degrees    Calculated R Axis 23 degrees    Calculated T Axis 107 degrees Diagnosis       Sinus rhythm with 1st degree AV block  Possible Left atrial enlargement  T wave abnormality, consider lateral ischemia  Abnormal ECG  No previous ECGs available     CBC WITH AUTOMATED DIFF    Collection Time: 11/21/17 11:55 AM   Result Value Ref Range    WBC 3.1 (L) 4.6 - 13.2 K/uL    RBC 5.28 4.70 - 5.50 M/uL    HGB 15.5 13.0 - 16.0 g/dL    HCT 47.7 36.0 - 48.0 %    MCV 90.3 74.0 - 97.0 FL    MCH 29.4 24.0 - 34.0 PG    MCHC 32.5 31.0 - 37.0 g/dL    RDW 14.1 11.6 - 14.5 %    PLATELET 332 174 - 994 K/uL    MPV 11.2 9.2 - 11.8 FL    NEUTROPHILS 48 40 - 73 %    LYMPHOCYTES 36 21 - 52 %    MONOCYTES 15 (H) 3 - 10 %    EOSINOPHILS 0 0 - 5 %    BASOPHILS 1 0 - 2 %    ABS. NEUTROPHILS 1.5 (L) 1.8 - 8.0 K/UL    ABS. LYMPHOCYTES 1.1 0.9 - 3.6 K/UL    ABS. MONOCYTES 0.5 0.05 - 1.2 K/UL    ABS. EOSINOPHILS 0.0 0.0 - 0.4 K/UL    ABS.  BASOPHILS 0.0 0.0 - 0.06 K/UL    DF AUTOMATED     METABOLIC PANEL, BASIC    Collection Time: 11/21/17 11:55 AM   Result Value Ref Range    Sodium 140 136 - 145 mmol/L    Potassium 3.5 3.5 - 5.5 mmol/L    Chloride 99 (L) 100 - 108 mmol/L    CO2 34 (H) 21 - 32 mmol/L    Anion gap 7 3.0 - 18 mmol/L    Glucose 177 (H) 74 - 99 mg/dL    BUN 28 (H) 7.0 - 18 MG/DL    Creatinine 2.12 (H) 0.6 - 1.3 MG/DL    BUN/Creatinine ratio 13 12 - 20      GFR est AA 38 (L) >60 ml/min/1.73m2    GFR est non-AA 31 (L) >60 ml/min/1.73m2    Calcium 9.3 8.5 - 10.1 MG/DL   CARDIAC PANEL,(CK, CKMB & TROPONIN)    Collection Time: 11/21/17 11:55 AM   Result Value Ref Range    CK 98 39 - 308 U/L    CK - MB <1.0 <3.6 ng/ml    CK-MB Index  0.0 - 4.0 %     CALCULATION NOT PERFORMED WHEN RESULT IS BELOW LINEAR LIMIT    Troponin-I, Qt. <0.02 0.00 - 0.06 NG/ML   POC LACTIC ACID    Collection Time: 11/21/17 12:58 PM   Result Value Ref Range    Lactic Acid (POC) 2.3 (HH) 0.4 - 2.0 mmol/L   URINALYSIS W/ RFLX MICROSCOPIC    Collection Time: 11/21/17  3:20 PM   Result Value Ref Range    Color YELLOW      Appearance CLEAR Specific gravity 1.015 1.005 - 1.030      pH (UA) 5.0 5.0 - 8.0      Protein NEGATIVE  NEG mg/dL    Glucose NEGATIVE  NEG mg/dL    Ketone NEGATIVE  NEG mg/dL    Bilirubin NEGATIVE  NEG      Blood NEGATIVE  NEG      Urobilinogen 0.2 0.2 - 1.0 EU/dL    Nitrites NEGATIVE  NEG      Leukocyte Esterase NEGATIVE  NEG         EKG Interpretation by ED physician    NSR, T wave inversions lat leads w/o st elevation (no old available)    X-ray, CT or radiology findings or impressions:  XR CHEST PA LAT   Final Result      IMPRESSION  Impression:      No radiographic evidence of acute cardiopulmonary disease. Reevaluation of the patient:   Feel better after IVF. Diagnosis:   1. Near syncope    2. Febrile illness    3. Dehydration    4. Azotemia      1) Routine labs ok with low-normal white blood cell count and negative urinalysis. Electrolytes and kidney function consitent with mild dehydration that likely contributed to near faint. EKG ok. 2) Drink plenty of fluids  3) Follow up with your PCP recheck in 5-7 days  4) Return for acutely worsening symptoms, high fever, trua fainting, chest pain, shortness of breath. Disposition: home    Follow-up Information     Follow up With Details Comments [address] [facility], DO In 1 week As needed, If symptoms persist [address] [place] [address] EMERGENCY DEPT  If symptoms worsen [address] [id number] [phone]           Patient's Medications   Start Taking    No medications on file   Continue Taking    ATORVASTATIN (LIPITOR) 80 MG TABLET    Take 1 Tab by mouth daily. CHOLECALCIFEROL (VITAMIN D3) 1,000 UNIT CAP    Take 1 Cap by mouth daily. HYDROCHLOROTHIAZIDE (HYDRODIURIL) 25 MG TABLET    Take 1 Tab by mouth daily. LOSARTAN (COZAAR) 100 MG TABLET    Take 1 Tab by mouth daily. METFORMIN (GLUCOPHAGE) 500 MG TABLET    Take 1 Tab by mouth two (2) times daily (with meals).    These Medications have changed    Modified Medication Previous Medication    CLOPIDOGREL (PLAVIX) 75 MG TAB clopidogrel (PLAVIX) 75 mg tab       Take 1 Tab by mouth daily. Stop Taking    No medications on file       Scribe Mary Ulrich acting as a scribe for and in the presence of Vanessa Robledo MD      November 21, 2017 at 12:21 PM       Provider Attestation:      I personally performed the services described in the documentation, reviewed the documentation, as recorded by the scribe in my presence, and it accurately and completely records my words and actions.  November 21, 2017 at 12:21 PM - Vanessa Robledo MD

## 2017-11-21 NOTE — TELEPHONE ENCOUNTER
Please send a Refill of the Plavix to the [facility] for this Pt. No Hard copy.   Thank you

## 2017-11-21 NOTE — PATIENT INSTRUCTIONS
A Healthy Lifestyle: Care Instructions  Your Care Instructions    A healthy lifestyle can help you feel good, stay at a healthy weight, and have plenty of energy for both work and play. A healthy lifestyle is something you can share with your whole family. A healthy lifestyle also can lower your risk for serious health problems, such as high blood pressure, heart disease, and diabetes. You can follow a few steps listed below to improve your health and the health of your family. Follow-up care is a key part of your treatment and safety. Be sure to make and go to all appointments, and call your doctor if you are having problems. It's also a good idea to know your test results and keep a list of the medicines you take. How can you care for yourself at home? · Do not eat too much sugar, fat, or fast foods. You can still have dessert and treats now and then. The goal is moderation. · Start small to improve your eating habits. Pay attention to portion sizes, drink less juice and soda pop, and eat more fruits and vegetables. ¨ Eat a healthy amount of food. A 3-ounce serving of meat, for example, is about the size of a deck of cards. Fill the rest of your plate with vegetables and whole grains. ¨ Limit the amount of soda and sports drinks you have every day. Drink more water when you are thirsty. ¨ Eat at least 5 servings of fruits and vegetables every day. It may seem like a lot, but it is not hard to reach this goal. A serving or helping is 1 piece of fruit, 1 cup of vegetables, or 2 cups of leafy, raw vegetables. Have an apple or some carrot sticks as an afternoon snack instead of a candy bar. Try to have fruits and/or vegetables at every meal.  · Make exercise part of your daily routine. You may want to start with simple activities, such as walking, bicycling, or slow swimming. Try to be active 30 to 60 minutes every day. You do not need to do all 30 to 60 minutes all at once.  For example, you can exercise 3 times a day for 10 or 20 minutes. Moderate exercise is safe for most people, but it is always a good idea to talk to your doctor before starting an exercise program.  · Keep moving. Ata Schaeferagi the lawn, work in the garden, or [employer]. Take the stairs instead of the elevator at work. · If you smoke, quit. People who smoke have an increased risk for heart attack, stroke, cancer, and other lung illnesses. Quitting is hard, but there are ways to boost your chance of quitting tobacco for good. ¨ Use nicotine gum, patches, or lozenges. ¨ Ask your doctor about stop-smoking programs and medicines. ¨ Keep trying. In addition to reducing your risk of diseases in the future, you will notice some benefits soon after you stop using tobacco. If you have shortness of breath or asthma symptoms, they will likely get better within a few weeks after you quit. · Limit how much alcohol you drink. Moderate amounts of alcohol (up to 2 drinks a day for men, 1 drink a day for women) are okay. But drinking too much can lead to liver problems, high blood pressure, and other health problems. Family health  If you have a family, there are many things you can do together to improve your health. · Eat meals together as a family as often as possible. · Eat healthy foods. This includes fruits, vegetables, lean meats and dairy, and whole grains. · Include your family in your fitness plan. Most people think of activities such as jogging or tennis as the way to fitness, but there are many ways you and your family can be more active. Anything that makes you breathe hard and gets your heart pumping is exercise. Here are some tips:  ¨ Walk to do errands or to take your child to school or the bus. ¨ Go for a family bike ride after dinner instead of watching TV. Where can you learn more? Go to http://david-kayla.info/. Enter U534 in the search box to learn more about \"A Healthy Lifestyle: Care Instructions. \"  Current as of: May 12, 2017  Content Version: 11.4  © 7500-7712 Healthwise, [employer]. Care instructions adapted under license by [employer] (which disclaims liability or warranty for this information). If you have questions about a medical condition or this instruction, always ask your healthcare professional. [address] any warranty or liability for your use of this information. Medicare Wellness Visit, Male    The best way to live healthy is to have a healthy lifestyle by eating a well-balanced diet, exercising regularly, limiting alcohol and stopping smoking. Regular physical exams and screening tests are another way to keep healthy. Preventive exams provided by your health care provider can find health problems before they become diseases or illnesses. Preventive services including immunizations, screening tests, monitoring and exams can help you take care of your own health. All people over age 72 should have a pneumovax  and and a prevnar shot to prevent pneumonia. These are once in a lifetime unless you and your provider decide differently. All people over 65 should have a yearly flu shot and a tetanus vaccine every 10 years. Screening for diabetes mellitus with a blood sugar test should be done every year. Glaucoma is a disease of the eye due to increased ocular pressure that can lead to blindness and it should be done every year by an eye professional.    Cardiovascular screening tests that check for elevated lipids (fatty part of blood) which can lead to heart disease and strokes should be done every 5 years. Colorectal screening that evaluates for blood or polyps in your colon should be done yearly as a stool test or every five years as a flexible sigmoidoscope or every 10 years as a colonoscopy up to age 76. Men up to age 76 may need a screening blood test for prostate cancer at certain intervals, depending on their personal and family history. This decision is between the patient and his provider. If you have been a smoker or had family history of abdominal aortic aneurysms, you and your provider may decide to schedule an ultrasound test of your aorta. Hepatitis C screening is also recommended for anyone born between [birth year] through [address]. A shingles vaccine is also recommended once in a lifetime after age 61. Your Medicare Wellness Exam is recommended annually.     Here is a list of your current Health Maintenance items with a due date:  Health Maintenance Due   Topic Date Due    Glaucoma Screening   04/23/2012    Annual Well Visit  04/23/2012    Eye Exam  03/03/2015

## 2017-11-21 NOTE — ED TRIAGE NOTES
Pt brought in by EMS for syncope at MD office. Dr Ronak Mon, Internal Medicine called 911 for transport. Pt defecated self when he passed out. Pt A&O x 4 upon arrival. Pt states he feels \"very weak\".

## 2017-11-21 NOTE — PROGRESS NOTES
Chief Complaint   Patient presents with    Well Male   Patient here today for [facility] exam. Pt has been weak and had a Fever with Dizziness. Pt will need a referral for Neurology. Pt will need a referral for Sleep apnea1. Pt has concerns with medications  1. Have you been to the ER, urgent care clinic since your last visit? Hospitalized since your last visit? No    2. Have you seen or consulted any other health care providers outside of the [address] [place] [address] since your last visit? Include any pap smears or colon screening.  Yes [facility]

## 2018-01-08 DIAGNOSIS — G47.33 OSA (OBSTRUCTIVE SLEEP APNEA): Primary | ICD-10-CM

## 2018-01-11 ENCOUNTER — TELEPHONE (OUTPATIENT)
Dept: FAMILY MEDICINE CLINIC | Age: 73
End: 2018-01-11

## 2018-01-11 DIAGNOSIS — E55.9 VITAMIN D DEFICIENCY: ICD-10-CM

## 2018-01-11 DIAGNOSIS — E11.65 TYPE 2 DIABETES MELLITUS WITH HYPERGLYCEMIA, WITHOUT LONG-TERM CURRENT USE OF INSULIN (HCC): ICD-10-CM

## 2018-01-11 RX ORDER — GLUCOSAMINE SULFATE 1500 MG
1000 POWDER IN PACKET (EA) ORAL DAILY
Qty: 30 CAP | Refills: 5 | Status: SHIPPED | OUTPATIENT
Start: 2018-01-11 | End: 2018-10-25

## 2018-01-11 RX ORDER — METFORMIN HYDROCHLORIDE 500 MG/1
500 TABLET ORAL 2 TIMES DAILY WITH MEALS
Qty: 60 TAB | Refills: 5 | Status: SHIPPED | OUTPATIENT
Start: 2018-01-11 | End: 2018-03-06 | Stop reason: SDUPTHER

## 2018-01-11 NOTE — TELEPHONE ENCOUNTER
The pharmacy in [place] called stating they can't take the rx that was sent over due to they are a compound pharmacy not a regular one  Please advise on patients rx

## 2018-01-11 NOTE — TELEPHONE ENCOUNTER
Requested Prescriptions     Pending Prescriptions Disp Refills    cholecalciferol (VITAMIN D3) 1,000 unit cap 30 Cap 5     Sig: Take 1 Cap by mouth daily.  metFORMIN (GLUCOPHAGE) 500 mg tablet 60 Tab 5     Sig: Take 1 Tab by mouth two (2) times daily (with meals).    Last OV 11/21/17  Next OV 2/21/18    11/15/2017  9:14 AM - [facility]   Component Results   Component Value Flag Ref Range Units Status   Vitamin D 25-Hydroxy 30.5  30 - 100 ng/mL Final     11/14/2017  9:34 PM - [facility]   Component Results   Component Value Flag Ref Range Units Status   Hemoglobin A1c 6.9 (H) 4.2 - 5.6 % Final   Comment:   (NOTE)   HbA1C Interpretive Ranges   <5.7              Normal   5.7 - 6.4         Consider Prediabetes   >6.5              Consider Diabetes      Est. average glucose 151   mg/dL Final

## 2018-01-15 NOTE — TELEPHONE ENCOUNTER
I called the pharmacy in regards to medication. Sts that they only fill the plavix for this Patient but recvd an Rx for Vitamin D and Metformin. Informed pharyVisterra tech that I would contact the local pharmacy in regards to medication. Order was cancelled for this Pharmacy.

## 2018-01-15 NOTE — TELEPHONE ENCOUNTER
I called Pt in regards to Pharmacy information. Metformin and Vit D were sent to a pharmacy in [facility] would like to see if the patient is ok with senting the Rx to the local pharmacy instead. LM on  for return call.

## 2018-02-09 ENCOUNTER — OFFICE VISIT (OUTPATIENT)
Dept: PULMONOLOGY | Age: 73
End: 2018-02-09

## 2018-02-09 VITALS
TEMPERATURE: 98 F | RESPIRATION RATE: 18 BRPM | OXYGEN SATURATION: 97 % | BODY MASS INDEX: 32.44 KG/M2 | DIASTOLIC BLOOD PRESSURE: 68 MMHG | WEIGHT: 190 LBS | HEART RATE: 77 BPM | SYSTOLIC BLOOD PRESSURE: 128 MMHG | HEIGHT: 64 IN

## 2018-02-09 DIAGNOSIS — F43.12 CHRONIC POST-TRAUMATIC STRESS DISORDER (PTSD): ICD-10-CM

## 2018-02-09 DIAGNOSIS — E66.9 OBESITY (BMI 30.0-34.9): ICD-10-CM

## 2018-02-09 DIAGNOSIS — G47.33 OSA (OBSTRUCTIVE SLEEP APNEA): ICD-10-CM

## 2018-02-09 DIAGNOSIS — R06.83 SNORING: Primary | ICD-10-CM

## 2018-02-09 DIAGNOSIS — F12.90 MARIJUANA USE, EPISODIC: ICD-10-CM

## 2018-02-09 PROBLEM — E11.21 TYPE 2 DIABETES WITH NEPHROPATHY (HCC): Status: ACTIVE | Noted: 2018-02-09

## 2018-02-09 NOTE — MR AVS SNAPSHOT
[address], [address] [address] 
[phone] Patient: Markos Jackson MRN: [id number] SQU:[id number] Visit Information Date & Time Provider Department Dept. Phone Encounter #  
 2/9/2018  9:30 AM DO [facility] Barak Zee [id number] Follow-up Instructions Return in about 3 months (around 5/9/2018). Your Appointments 4/25/2018 11:30 AM  
ESTABLISHED PATIENT with Jeremy Higuera MD  
[facility]) Appt Note: RTC in 6 months with Dr. Arline Kanner  with SD psa  
 [address], [address] [address] [facility] [address]  
  
   
 [address], [address] [place] [address] Upcoming Health Maintenance Date Due  
 GLAUCOMA SCREENING Q2Y 4/23/2012 EYE EXAM RETINAL OR DILATED Q1 3/3/2015 HEMOGLOBIN A1C Q6M 5/14/2018 FOOT EXAM Q1 6/12/2018 MICROALBUMIN Q1 11/14/2018 LIPID PANEL Q1 11/14/2018 MEDICARE YEARLY EXAM 11/22/2018 COLONOSCOPY 11/21/2021 DTaP/Tdap/Td series (2 - Td) 11/14/2027 Allergies as of 2/9/2018  Review Complete On: 2/9/2018 By: Austin Hernandez LPN No Known Allergies Current Immunizations  Reviewed on 7/12/2012 Name Date ZZZ-RETIRED (DO NOT USE) Pneumococcal Vaccine (Unspecified Type) 7/12/2012 Not reviewed this visit You Were Diagnosed With   
  
 Codes Comments Snoring    -  Primary ICD-10-CM: R06.83 
ICD-9-CM: 786.09 Vitals BP Pulse Temp Resp Height(growth percentile) Weight(growth percentile) 128/68 (BP 1 Location: Left arm, BP Patient Position: Sitting) 77 98 °F (36.7 °C) (Oral) 18 5' 4\" (1.626 m) 190 lb (86.2 kg) SpO2 BMI Smoking Status 97% 32.61 kg/m2 Never Smoker BMI and BSA Data Body Mass Index Body Surface Area  
 32.61 kg/m 2 1.97 m 2 Preferred Pharmacy Pharmacy Name Phone [facility], [address] [place] [address] Your Updated Medication List  
  
   
This list is accurate as of: 2/9/18 10:39 AM.  Always use your most recent med list.  
  
  
  
  
 atorvastatin 80 mg tablet Commonly known as:  LIPITOR Take 1 Tab by mouth daily. cholecalciferol 1,000 unit Cap Commonly known as:  VITAMIN D3 Take 1 Cap by mouth daily. clopidogrel 75 mg Tab Commonly known as:  PLAVIX Take 1 Tab by mouth daily. hydroCHLOROthiazide 25 mg tablet Commonly known as:  HYDRODIURIL Take 1 Tab by mouth daily. losartan 100 mg tablet Commonly known as:  COZAAR Take 1 Tab by mouth daily. metFORMIN 500 mg tablet Commonly known as:  GLUCOPHAGE Take 1 Tab by mouth two (2) times daily (with meals). Follow-up Instructions Return in about 3 months (around 5/9/2018). To-Do List   
 02/10/2018 Sleep Center:  SLEEP STUDY LIMITED Introducing [facility] SERVICES! [address] introduces MediSens patient portal. Now you can access parts of your medical record, email your doctor's office, and request medication refills online. 1. In your internet browser, go to [email or web address] 2. Click on the First Time User? Click Here link in the Sign In box. You will see the New Member Sign Up page. 3. Enter your MediSens Access Code exactly as it appears below. You will not need to use this code after youve completed the sign-up process. If you do not sign up before the expiration date, you must request a new code. · MediSens Access Code: [email or web address] Expires: 5/10/2018  9:38 AM 
 
4. Enter the last four digits of your Social Security Number (xxxx) and Date of Birth (mm/dd/yyyy) as indicated and click Submit. You will be taken to the next sign-up page. 5. Create a MediSens ID. This will be your MediSens login ID and cannot be changed, so think of one that is secure and easy to remember. 6. Create a [employer] password. You can change your password at any time. 7. Enter your Password Reset Question and Answer. This can be used at a later time if you forget your password. 8. Enter your e-mail address. You will receive e-mail notification when new information is available in [address] 9. Click Sign Up. You can now view and download portions of your medical record. 10. Click the Download Summary menu link to download a portable copy of your medical information. If you have questions, please visit the Frequently Asked Questions section of the [employer] website. Remember, [employer] is NOT to be used for urgent needs. For medical emergencies, dial 911. Now available from your iPhone and Android! Please provide this summary of care documentation to your next provider. Your primary care clinician is listed as [address] If you have any questions after today's visit, please call [phone].

## 2018-02-09 NOTE — PROGRESS NOTES
Mr. Antonio Cisse has a reminder for a \"due or due soon\" health maintenance. I have asked that he contact his primary care provider for follow-up on this health maintenance. Chief Complaint   Patient presents with    Sleep Apnea     1. Have you been to the ER, urgent care clinic since your last visit? Hospitalized since your last visit? No    2. Have you seen or consulted any other health care providers outside of the [address] [place] [address] since your last visit? Include any pap smears or colon screening.  No

## 2018-02-09 NOTE — PROGRESS NOTES
[facility]  Pulmonary, Critical Care, and Sleep Medicine     Office Progress Note- Initial Evaluation      Primary Care Physician: Paty Stuart DO     Reason for Visit:  Evaluation for BRENDAN    Assessment:  1. Snoring- disruptive  2. H/O BRENDAN per patient report  3. Excessive Daytime Sleepiness (EDS)  4. PTSD- [facility] by [facility]/ anxiety  5. Marijuana use  6. H/o Cocaine use  7. Obesity    Discussion:  Mr. Amanda Garcia is a 81 y.o. male who has symptoms and exam findings suggestive of a sleep breathing disorder. He also reports some\" kicking\" while sleeping. Unclear if these are from arousal, PLMD or something else. Ideally, would pursue an in-lab study. However the patient would like to avoid sleep lab if possible. Given the patient's history it would be reasonable to attempt an HST first. The patient is agreeable to this. If the patient does have BRENDAN we can start a trial of APAP and monitor for response. Based on our discussion will proceed with the following    Plan:  ·   · Schedule patient for home sleep study for further evaluation. · Potential consequences of untreated sleep apnea, and/or excessive daytime sleepiness were discussed with the patient. · Educational materials provided. · Treatment options including CPAP, dental appliance, weight reduction measures, positional therapy, surgeries etc were discussed. · Healthy lifestyle changes to include weight loss and exercise discussed. · Healthy sleep habits were reviewed and encouraged. ·  and workplace safety reviewed and discussed as appropriate. Drowsy and/or inattentive driving should be avoided. · Follow-up in 3 months, sooner should new symptoms or problems arise. History of Present Illness: Mr. Amanda Garcia is a 81 y.o. male patient who reports disruptive snoring and a prior diagnosis of BRENDAN. The history was provided by the patient.     [facility] He was prescribed CPAP at that time but could never get use to the device and stopped using. The patient presents again today for re-evaluation and at the urging of his wife. 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 - The patient refers to it as \"Heavy\"       Motor vehicle accident(s) associated with drowsy driving:is denied by the patient     Snoring: This is a Chronic problem which has been ongoing for years. His wife sleeps in another room which is disturbing to the patient and one of the main reasons for this follow up. Fatigue: This is a Chronic problem that has been on going for years. Fatigue has been increasing which he attributes to getting older. [place] today is 5/24    Dental: Teeth clenching or grindingis not reported. Naps: are reported. Daily 11:00 -1400 ( 10-15 minutes during the work; 2 hours on weekend)    Leg Symptoms/Pain: He does not have unpleasant or crawling sensation in legs or strong urge to move when inactive. GERD: is not reported. Mood: Has PTSD- does have flashbacks but no nightmares, Does have depression and anxiety but feels it is currently controlled. See's a psychiatrist at [address]. No thoughts of harming self or others. Denies prior suicidal attempts. Sleep-Wake History:     Estimates  Sleeping approximately 3-4 hours per night/day. He gets into bed at approximately . Once he gets into bed and does not watch TV or uses his phone. Room is dark. It usually takes up to 45 minutes to fall asleep after going to bed. The patient awakens several times throughout the night: Wife reports kicking, snoring but the patient is not aware of this. Gets up to use the bathroom once nightly ( He had a prostatectomy one year ago.  Has urinary incontinence and wears a diaper which he changes once nightly). Pain does not disrupt his sleep. Does report vivid dreams which he finds pleasant. Normally gets up out of bed at 0600 during the week and  on the weekend. Awakens spontaneously. Denies awakening with a headache or dry mouth. He denies symptoms suggestive of cataplexy, sleep paralysis, hypnagogic and/or hypnopompic hallucinations. No reports of  sleep talking/ walking, or other unusual sleep behaviors. Family Sleep History:  - none known      No flowsheet data found. PHQ over the last two weeks 2/9/2018   Little interest or pleasure in doing things Not at all   Feeling down, depressed or hopeless Not at all   Total Score PHQ 2 0       Laie Scale 2/9/2018   Sitting and Reading 0   Watching TV 1   Sitting, inactive in a public place (e.g. a movie theater or meeting) 1   As a passenger in a car for an hour, without a break 1   Lying down to rest in the afternoon, when circumstances permit 1   Sitting and talking to someone 0   Sitting quietly after lunch without alcohol 1   In a car, while stopped for a few minutes in traffic 0   Laie Sleepiness Score 5        Neck circ.  in \"inches\": 16         Past Medical History:  Past Medical History:   Diagnosis Date    BPH (benign prostatic hypertrophy) with urinary obstruction     CAD (coronary artery disease) 6-21-02 8-5-06 Th[facility] ST: +inferolat ischemic    CVA 3-17-06    acute CVA, L Eula w/ mild R sided weakness    Diabetes [facility]     Diabetes mellitus ([facility] [address] 7/12/2012    ED (erectile dysfunction) 12-22-08    Elevated PSA 11/21/2011    Erectile dysfunction due to arterial insufficiency     Erectile dysfunction of organic origin     Essential hypertension     Heartburn 12-4-03    Hemorrhoids     Hypercholesterolemia 7-15-08    TSH 0.88    Hyperglycemia     Hyperlipidemia     Hypertension 6-22-01    MILD    Obstructive sleep apnea (adult) (pediatric) 2-2009  Prediabetes 8/18/2010    Prostate cancer ([facility] [address] 03/25/2016    Darvin 7 in 1/12 cores and Darvin 6 in 2/12 cores. PSA 6.0ng/mL. TRUS Volume 143 grams    Psoriasis     Rising PSA level     Rotator cuff tear, right     [facility]. Martín Aldana ([facility])    Seborrheic dermatitis     Shoulder pain, right 1/27/2012    Stroke [facility])     FIDEL (stress urinary incontinence), male     Vitamin D deficiency 11/25/2011       Past Surgical History:  Past Surgical History:   Procedure Laterality Date    HX KNEE ARTHROSCOPY      HX PROSTATECTOMY  11/16/2016    Robotic-assisted laparoscopic radical prostatectomy. Robotic-assisted laparoscopic bilateral pelvic lymph node dissection  .Dissection was very difficult, bladder neck repair/anastamosis complex and time for this was >50% of time ordinarily required for this procedure.     OH COLONOSCOPY FLX DX W/COLLJ SPEC WHEN PFRMD  4-30-01    papules/ Christiano    OH COLONOSCOPY FLX DX W/COLLJ SPEC WHEN PFRMD      normal . Dr. Atilio Cash       Family History:  Family History   Problem Relation Age of Onset    Other Brother      vocal cord polyps    Cancer Brother      throat    Heart Disease Brother     Heart Attack Brother     Cancer Mother      leukemia       Social History:  Social History   Substance Use Topics    Smoking status: Never Smoker    Smokeless tobacco: Never Used    Alcohol use 0.5 - 1.0 oz/week     1 - 2 Cans of beer per week      Comment: Socially        Caffeine Amount Time of last Intake Comments   Coffee None     Soda 1 can QOD  Regular, caffinated   Tea 1 glass QOD     Energy Drinks None     Over- the - counter stimulant pills None     Other Substances      Alcohol 1 drink Q 3 days  Wine, wiskey   Tobacco Never     Drugs Marijuana- smoke  1 week ago; 2/year     H/O Cocaine 4 years ago        Medications:  Current Outpatient Prescriptions on File Prior to Visit   Medication Sig Dispense Refill    cholecalciferol (VITAMIN D3) 1,000 unit cap Take 1 Cap by mouth daily. 30 Cap 5    metFORMIN (GLUCOPHAGE) 500 mg tablet Take 1 Tab by mouth two (2) times daily (with meals). 60 Tab 5    clopidogrel (PLAVIX) 75 mg tab Take 1 Tab by mouth daily. 90 Tab 0    atorvastatin (LIPITOR) 80 mg tablet Take 1 Tab by mouth daily. 30 Tab 5    hydroCHLOROthiazide (HYDRODIURIL) 25 mg tablet Take 1 Tab by mouth daily. 30 Tab 5    losartan (COZAAR) 100 mg tablet Take 1 Tab by mouth daily. 30 Tab 5     No current facility-administered medications on file prior to visit. Allergy:  No Known Allergies    Review of Systems  General ROS: positive for  - fatigue and sleep disturbance  negative for - chills, fever, malaise, weight gain or weight loss  ENT ROS: negative  Hematological and Lymphatic ROS: negative for - bleeding problems, blood clots, bruising, jaundice, pallor or swollen lymph nodes  Endocrine ROS: negative for - polydipsia/polyuria, skin changes, temperature intolerance or unexpected weight changes  Respiratory ROS: no cough, shortness of breath, or wheezing  Cardiovascular ROS: no chest pain or dyspnea on exertion  Gastrointestinal ROS: no abdominal pain, change in bowel habits, or black or bloody stools  Genito-Urinary ROS: no dysuria, trouble voiding, or hematuria  Musculoskeletal ROS: negative  Neurological ROS: no TIA or stroke symptoms  Dermatological ROS: negative for - pruritus, rash or skin lesion changes   Psychological ROS: as above  Otherwise negative. Physical Exam:  Blood pressure 128/68, pulse 77, temperature 98 °F (36.7 °C), temperature source Oral, resp. rate 18, height 5' 4\" (1.626 m), weight 86.2 kg (190 lb), SpO2 97 %. on room air, Body mass index is 32.61 kg/(m^2).          3/14/2017 4/6/2017 6/12/2017 7/31/2017 8/12/2017 8/14/2017   WEIGHT 188 lb 9.6 oz 188 lb 189 lb 6.4 oz 181 lb 181 lb 181 lb      10/10/2017 11/14/2017 11/21/2017 2/9/2018   WEIGHT 181 lb 185 lb 6.4 oz 182 lb 190 lb     General: in no respiratory distress, acyanotic, appears stated age, cooperative, pleasant  HEENT: PERRL, EOMI, throat without erythema or exudate, + Macroglossia- dental indention on tongue, Mallampati's score 4+, Uvula- midline- Tonsils- present; not fully visualized- strong gag response  Neck: Supple,  no abnormally enlarged lymph nodes, thyroid is not enlarged, non-tender, No JVD  Chest: normal  Lungs: moderate air entry, clear to auscultation bilaterally,   Heart: Regular rate and rhythm, S1S2 present, without murmur  Abdomen: Obese, bowel sounds normoactive, abdomen is soft without significant tenderness, or guarding  Extremity: negative for edema, cyanosis or clubbing  Skin: Skin color, texture, turgor normal. No rashes or lesions    Data Reviewed:  CBC: Lab Results   Component Value Date/Time    WBC 3.1 (L) 11/21/2017 11:55 AM    HGB 15.5 11/21/2017 11:55 AM    HCT 47.7 11/21/2017 11:55 AM    PLATELET 449 58/24/8283 11:55 AM    MCV 90.3 11/21/2017 11:55 AM       BMP: Lab Results   Component Value Date/Time    Sodium 140 11/21/2017 11:55 AM    Potassium 3.5 11/21/2017 11:55 AM    Chloride 99 (L) 11/21/2017 11:55 AM    CO2 34 (H) 11/21/2017 11:55 AM    Anion gap 7 11/21/2017 11:55 AM    Glucose 177 (H) 11/21/2017 11:55 AM    BUN 28 (H) 11/21/2017 11:55 AM    Creatinine 2.12 (H) 11/21/2017 11:55 AM    BUN/Creatinine ratio 13 11/21/2017 11:55 AM    GFR est AA 38 (L) 11/21/2017 11:55 AM    GFR est non-AA 31 (L) 11/21/2017 11:55 AM    Calcium 9.3 11/21/2017 11:55 AM        TSH:  Lab Results   Component Value Date/Time    TSH 1.33 11/14/2017 02:31 PM    TSH 1.52 06/12/2017 08:10 AM    TSH 1.03 11/02/2016 11:20 AM    TSH 1.070 04/14/2014 12:51 PM    TSH 1.270 11/13/2013 11:38 AM    TSH 1.330 07/10/2012 11:30 AM    TSH 1.330 12/01/2010 09:15 AM       Imaging:  [x]I have personally reviewed the patients radiographs section     Results from Hospital Encounter encounter on 11/21/17   XR CHEST PA LAT   Narrative AP and lateral chest    History: Syncope and weakness today    Comparison: None    Findings: The cardiomediastinal silhouette is within normal limits. The pulmonary  vasculature is normal.  There is no focal consolidation, pneumothorax or pleural  effusion. The acromioclavicular joints are moderately hypertrophied. Impression Impression:     No radiographic evidence of acute cardiopulmonary disease. Thank you for this referral.          Results from Orders Only encounter on 11/14/16   CT ABD PELV W WO CONT   Impression CT STUDY OF THE ABDOMEN AND PELVIS    HISTORY:  82-year-old man     SYMPTOMS:  Prostate cancer, elevated PSA     REFERRING DIAGNOSIS:  Malignant neoplasm of prostate     TECHNIQUE: Ultra-low-dose helical    IN HOUSE CREATININE: 11-11-16    REASON: Age, Diabetic, and HTN    CREATININE: 1.3 eGFR: 63.44    CONTRAST DECISION: 100cc of Isovue      COMPARISON:  CT February 5, 2016     FINDINGS:      Kidneys/ureters: The kidneys appear normal in size, morphology, position, and contrast enhancement pattern. No calcified stones are identified in the kidneys, ureters or bladder. No solid masses are identified. No hydronephrosis or perirenal fluid collections. The ureters and bladder are unremarkable. Liver: The liver is normal in size, morphology and contrast enhancement pattern. Severe steatosis. No focal lesion identified. Biliary tree: Within normal limits. Gallbladder:  Within normal limits. Spleen:  Within normal limits. Pancreas:  Within normal limits. Adrenals:  Within normal limits. Lung bases:  Within normal limits. Bowel:  Small sliding hiatal hernia. No bowel obstruction or inflammation is seen. The appendix appears normal.           Aorta/vasculature: Within normal limits. Pelvis:  Prostatic enlargement. Multiple small stones are seen in the dependent portion of the bladder lumen.             Miscellaneous:  No pathologically enlarged lymph nodes or ascites is seen. IMPRESSION:    No CT evidence for abdominopelvic metastases. Bladder calculi. Severe hepatic steatosis. Large prostate. Small sliding hiatal hernia. Thank you for this referral.                              Interpreting Radiologist:  Nkechi Senior M.D. Electronically signed:  Nkechi Senior   11/11/2016          Name:  Alireza Viera                                               Cardiac Echo:     No results found for this or any previous visit.        Historical Sleep Testing Data:- Not available        Belle Calix DO, [facility]  Pulmonary, Sleep and Critical Care Medicine

## 2018-02-12 PROBLEM — R55 NEAR SYNCOPE: Status: ACTIVE | Noted: 2018-02-12

## 2018-02-12 PROBLEM — E86.0 DEHYDRATION: Status: ACTIVE | Noted: 2018-02-12

## 2018-02-12 PROBLEM — R42 DIZZINESS: Status: ACTIVE | Noted: 2018-02-12

## 2018-02-13 NOTE — PROGRESS NOTES
Chief Complaint   Patient presents with    Well Male       HPI:  Patient is a 81year old obese  male with medical problems listed below presents today for annual medicare wellness exam. While in the examination room, he suddenly slumped off the chair and passed out with transient loss of consciousness. He defecated on himself and later was conscious and stated that he has not feeling well in the last several days as he had upper respiratory symptoms and associated was dizziness and fever causing him to feel weak. He was resuscitated appropriately and EMS was called and he was taken to [facility] ED promptly for evaluation. Past Medical History:   Diagnosis Date    BPH (benign prostatic hypertrophy) with urinary obstruction     CAD (coronary artery disease) 6-21-02 8-5-06 Thallium ST: +inferolat ischemic    CVA 3-17-06    acute CVA, L Eula w/ mild R sided weakness    Diabetes ([facility] [address] [facility]     Diabetes mellitus ([facility] [address] [facility] 7/12/2012    ED (erectile dysfunction) 12-22-08    Elevated PSA 11/21/2011    Erectile dysfunction due to arterial insufficiency     Erectile dysfunction of organic origin     Essential hypertension     Heartburn 12-4-03    Hemorrhoids     Hypercholesterolemia 7-15-08    TSH 0.88    Hyperglycemia     Hyperlipidemia     Hypertension 6-22-01    MILD    Obstructive sleep apnea (adult) (pediatric) 2-2009    Prediabetes 8/18/2010    Prostate cancer ([facility] [address] 03/25/2016    Darvin 7 in 1/12 cores and Darvin 6 in 2/12 cores. PSA 6.0ng/mL. TRUS Volume 143 grams    Psoriasis     Rising PSA level     Rotator cuff tear, right     followed by Dr. Orlin Cabrera ([facility])    Seborrheic dermatitis     Shoulder pain, right 1/27/2012    Stroke [facility])     FIDEL (stress urinary incontinence), male     Vitamin D deficiency 11/25/2011     No Known Allergies    Current Outpatient Prescriptions   Medication Sig Dispense Refill    clopidogrel (PLAVIX) 75 mg tab Take 1 Tab by mouth daily. 90 Tab 0    atorvastatin (LIPITOR) 80 mg tablet Take 1 Tab by mouth daily. 30 Tab 5    hydroCHLOROthiazide (HYDRODIURIL) 25 mg tablet Take 1 Tab by mouth daily. 30 Tab 5    losartan (COZAAR) 100 mg tablet Take 1 Tab by mouth daily. 30 Tab 5    cholecalciferol (VITAMIN D3) 1,000 unit cap Take 1 Cap by mouth daily. 30 Cap 5    metFORMIN (GLUCOPHAGE) 500 mg tablet Take 1 Tab by mouth two (2) times daily (with meals). 61 Tab 5     Past Surgical History:   Procedure Laterality Date    HX KNEE ARTHROSCOPY      HX PROSTATECTOMY  11/16/2016    Robotic-assisted laparoscopic radical prostatectomy. Robotic-assisted laparoscopic bilateral pelvic lymph node dissection  .Dissection was very difficult, bladder neck repair/anastamosis complex and time for this was >50% of time ordinarily required for this procedure.     WY COLONOSCOPY FLX DX W/COLLJ SPEC WHEN PFRMD  4-30-01    papules/ Christiano    WY COLONOSCOPY FLX DX W/COLLJ SPEC WHEN PFRMD      normal . Dr. Abbi Anderson       ROS:  Constitutional: Negative for fever, chills, or fatigue  Neurological: Negative for headache, dizziness, visual disturbance, or loss of conciousness  Respiratory: Negative for SOB, hemoptysis, or wheezing  Cardiovascular: Negative for chest pain, palpitation, or leg swelling  Gastrointestinal: Negative for abdominal pain, nausea, vomiting, diarrhea, blood in stool, melena, or heartburn  Musculoskeletal: Negative for falls      Physical Exam:  Visit Vitals    /70 (BP 1 Location: Left arm, BP Patient Position: Sitting)    Pulse (!) 104    Temp 98.3 °F (36.8 °C) (Oral)    Resp 16    Ht 5' 4\" (1.626 m)    Wt 182 lb 12.8 oz (82.9 kg)    SpO2 96%    BMI 31.38 kg/m2     General: Obese black male, a & o x 3, afebrile, well-nourished, interacting appropriately, in no acute distress  HEENT: Dry oropharynx and mucous membrane  Skin: warm and dry, no rashes , no bruises  Neck: supple, symmetrical, no thyromegaly  Respiratory: symmetrical chest expansion, lung sounds clear bilaterally, good air entry, good respiratory effort, no wheezes or crackles  Cardiovascular: normal S1S2, regular rate and rhythm, no murmurs, pulses palpable, no thrill, no carotid or abdominal bruits, no JVD  Abdomen: non-distended, normoactive bowel sounds x 4 quadrants, soft, non-tender to palpation  Extremity: No edema noted  Neurological: CN 2 to 12 intact with no focal deficits noted        Assessment/Plan:    ICD-10-CM ICD-9-CM    1. Well adult exam Z00.00 V70.0 AMB POC EKG ROUTINE W/ 12 LEADS, INTER & REP   2. Cerebrovascular accident (CVA), unspecified mechanism ([facility] [address] I63.9 434.91 clopidogrel (PLAVIX) 75 mg tab   3. Near syncope R55 780.2 Sent to the ED for evaluation. 4. Dizziness R42 780.4 Sent to the ED for evaluation   5. Dehydration E86.0 276.51 Sent to the ED for evaluation       Orders Placed This Encounter    AMB POC EKG ROUTINE W/ 12 LEADS, INTER & REP     Order Specific Question:   Reason for Exam:     Answer:   [facility] wellness    clopidogrel (PLAVIX) 75 mg tab     Sig: Take 1 Tab by mouth daily. Dispense:  90 Tab     Refill:  0       Additional Notes: Discussed today's diagnosis, treatment plans. Discussed medication indications and side effects. After Visit Summary: Discussed provided printed patient instructions. Answered questions accordingly. Follow-up Disposition: As previously scheduled        Flip Telles DO, MPH  Internal Medicine        Total time taken for today's visit was 40 minutes with greater than 50% of time spent involved in counseling and coordination of care as outlined above.

## 2018-03-05 ENCOUNTER — TELEPHONE (OUTPATIENT)
Dept: FAMILY MEDICINE CLINIC | Age: 73
End: 2018-03-05

## 2018-03-05 NOTE — TELEPHONE ENCOUNTER
pts wife Dr Ramirez [facility] CECILIA) calling to schedule hosp f/u. Stated pt discharged 03/01/2018    She is asking for appt tomorrow 03/06/2018 early am, no appts are available.  Stated that is her only availability to bring him     Please advise

## 2018-03-05 NOTE — TELEPHONE ENCOUNTER
I called Pts wife back in regards to Appt. Per Dr Mo Echols ok to double book on tomorrow at 10:00 a.m. Lesley Heaps Pts wife sts she will bring him in then.

## 2018-03-06 ENCOUNTER — OFFICE VISIT (OUTPATIENT)
Dept: FAMILY MEDICINE CLINIC | Age: 73
End: 2018-03-06

## 2018-03-06 VITALS
HEART RATE: 74 BPM | HEIGHT: 64 IN | SYSTOLIC BLOOD PRESSURE: 136 MMHG | TEMPERATURE: 98.1 F | OXYGEN SATURATION: 98 % | DIASTOLIC BLOOD PRESSURE: 80 MMHG | WEIGHT: 183.6 LBS | BODY MASS INDEX: 31.34 KG/M2 | RESPIRATION RATE: 18 BRPM

## 2018-03-06 DIAGNOSIS — E55.9 VITAMIN D DEFICIENCY: ICD-10-CM

## 2018-03-06 DIAGNOSIS — E78.00 PURE HYPERCHOLESTEROLEMIA: ICD-10-CM

## 2018-03-06 DIAGNOSIS — I63.9 CEREBROVASCULAR ACCIDENT (CVA), UNSPECIFIED MECHANISM (HCC): ICD-10-CM

## 2018-03-06 DIAGNOSIS — E11.65 TYPE 2 DIABETES MELLITUS WITH HYPERGLYCEMIA, WITHOUT LONG-TERM CURRENT USE OF INSULIN (HCC): ICD-10-CM

## 2018-03-06 DIAGNOSIS — Z09 HOSPITAL DISCHARGE FOLLOW-UP: Primary | ICD-10-CM

## 2018-03-06 DIAGNOSIS — I10 ESSENTIAL HYPERTENSION: ICD-10-CM

## 2018-03-06 RX ORDER — TAMSULOSIN HYDROCHLORIDE 0.4 MG/1
0.4 CAPSULE ORAL
COMMUNITY
Start: 2018-03-01 | End: 2018-10-25

## 2018-03-06 RX ORDER — LOSARTAN POTASSIUM 100 MG/1
100 TABLET ORAL DAILY
Qty: 30 TAB | Refills: 5 | Status: SHIPPED | OUTPATIENT
Start: 2018-03-06 | End: 2018-04-02 | Stop reason: SDUPTHER

## 2018-03-06 RX ORDER — SERTRALINE HYDROCHLORIDE 50 MG/1
50 TABLET, FILM COATED ORAL DAILY
Qty: 30 TAB | Refills: 3 | Status: SHIPPED | OUTPATIENT
Start: 2018-03-06

## 2018-03-06 RX ORDER — ATORVASTATIN CALCIUM 80 MG/1
80 TABLET, FILM COATED ORAL DAILY
Qty: 30 TAB | Refills: 5 | Status: SHIPPED | OUTPATIENT
Start: 2018-03-06 | End: 2018-06-11 | Stop reason: SDUPTHER

## 2018-03-06 RX ORDER — CLOPIDOGREL BISULFATE 75 MG/1
75 TABLET ORAL DAILY
Qty: 90 TAB | Refills: 0 | Status: SHIPPED | OUTPATIENT
Start: 2018-03-06

## 2018-03-06 RX ORDER — CLOPIDOGREL BISULFATE 75 MG/1
75 TABLET ORAL
COMMUNITY
Start: 2018-03-01 | End: 2018-04-02 | Stop reason: SDUPTHER

## 2018-03-06 RX ORDER — ASPIRIN 325 MG
325 TABLET ORAL DAILY
COMMUNITY
End: 2018-03-06 | Stop reason: SDUPTHER

## 2018-03-06 RX ORDER — HYDROCHLOROTHIAZIDE 25 MG/1
25 TABLET ORAL DAILY
Qty: 30 TAB | Refills: 5 | Status: SHIPPED | OUTPATIENT
Start: 2018-03-06 | End: 2018-04-02 | Stop reason: SDUPTHER

## 2018-03-06 RX ORDER — ASPIRIN 325 MG
325 TABLET ORAL DAILY
Qty: 90 TAB | Refills: 1 | Status: SHIPPED | OUTPATIENT
Start: 2018-03-06 | End: 2019-08-19

## 2018-03-06 RX ORDER — TERAZOSIN 2 MG/1
2 CAPSULE ORAL
Qty: 90 CAP | Refills: 1 | Status: CANCELLED | OUTPATIENT
Start: 2018-03-06

## 2018-03-06 RX ORDER — METFORMIN HYDROCHLORIDE 500 MG/1
500 TABLET ORAL 2 TIMES DAILY WITH MEALS
Qty: 60 TAB | Refills: 5 | Status: SHIPPED | OUTPATIENT
Start: 2018-03-06 | End: 2018-04-02 | Stop reason: SDUPTHER

## 2018-03-06 RX ORDER — TAMSULOSIN HYDROCHLORIDE 0.4 MG/1
0.4 CAPSULE ORAL
Status: CANCELLED | OUTPATIENT
Start: 2018-03-06

## 2018-03-06 RX ORDER — CLOPIDOGREL BISULFATE 75 MG/1
75 TABLET ORAL
Qty: 90 TAB | Refills: 1 | Status: CANCELLED | OUTPATIENT
Start: 2018-03-06

## 2018-03-06 RX ORDER — ERGOCALCIFEROL 1.25 MG/1
50000 CAPSULE ORAL
Qty: 6 CAP | Refills: 3 | Status: SHIPPED | OUTPATIENT
Start: 2018-03-06 | End: 2018-04-02 | Stop reason: SDUPTHER

## 2018-03-06 RX ORDER — SERTRALINE HYDROCHLORIDE 50 MG/1
TABLET, FILM COATED ORAL DAILY
COMMUNITY
End: 2018-03-06 | Stop reason: SDUPTHER

## 2018-03-06 RX ORDER — TERAZOSIN 2 MG/1
2 CAPSULE ORAL
COMMUNITY
Start: 2018-03-01 | End: 2019-08-19

## 2018-03-06 NOTE — PROGRESS NOTES
Chief Complaint   Patient presents with    Ischemic Stroke     hospital F/U     Patient is here today for hospital F/U from [facility] due to stroke. Pt will need a refill on all medication Printed out hard copy. Pt.will need a referral to [address] [place] [address] has an Appt with Neurology on March 23rd with Dr Jacquelin Jauregui of [place], [place]    1. Have you been to the ER, urgent care clinic since your last visit? Hospitalized since your last visit? Yes [facility] due to Stroke 2/28/18    2. Have you seen or consulted any other health care providers outside of the [address] [place] [address] since your last visit? Include any pap smears or colon screening.  No

## 2018-03-06 NOTE — PROGRESS NOTES
Chief Complaint   Patient presents with    Ischemic Stroke     hospital F/U       HPI:  Patient is a 81year old obese  male with medical problems listed below presents today for post hospital discharge visit. He was admitted at [facility] on 2/27/18 and was discharged on 3/1/18. During that admission, he presented with dizziness and was noted to have Acute pontine CVA noted on MRI  Brain and was started on  mg and continued on Plavix 75 mg daily and Lipitor 80 mg and his previous medication of Aggrenox was discontinued. He had carotid PVL one that revealed < 50% bilat ICA  and Echo that was wnl with EF at 65%. Apparently, he was grossly non complaint and was not taking his ASA and Plavix for several months prior to this admission. Post discharge, he has been doing well and denies headache, dizziness, vertigo, focal weakness, speech or vision problems, CP, SOB, or other complaints. He was accompanied by his wife to today's visit. He was seen by PT/OT during his recent admission and will be continued on physical therapy post discharge via [address] [place] [address] Michael Moody. Past Medical History:   Diagnosis Date    BPH (benign prostatic hypertrophy) with urinary obstruction     CAD (coronary artery disease) 6-21-02 8-5-06 Thallium ST: +inferolat ischemic    CVA 3-17-06    acute CVA, L Eula w/ mild R sided weakness    Diabetes ([facility] [address]     Diabetes mellitus ([facility] [address] 7/12/2012    ED (erectile dysfunction) 12-22-08    Elevated PSA 11/21/2011    Erectile dysfunction due to arterial insufficiency     Erectile dysfunction of organic origin     Essential hypertension     Heartburn 12-4-03    Hemorrhoids     Hypercholesterolemia 7-15-08    TSH 0.88    Hyperglycemia     Hyperlipidemia     Hypertension 6-22-01    MILD    Obstructive sleep apnea (adult) (pediatric) 2-2009    Prediabetes 8/18/2010    Prostate cancer ([facility] [address] 03/25/2016    Darvin 7 in 1/12 cores and Darvin 6 in 2/12 cores.  PSA 6.0ng/mL. TRUS Volume 143 grams    Psoriasis     Rising PSA level     Rotator cuff tear, right     followed by Dr. Yessy Recinos ([facility])    Seborrheic dermatitis     Shoulder pain, right 1/27/2012    Stroke [facility])     FIDEL (stress urinary incontinence), male     Vitamin D deficiency 11/25/2011     No Known Allergies    Current Outpatient Prescriptions   Medication Sig Dispense Refill    clopidogrel (PLAVIX) 75 mg tab 75 mg.  terazosin (HYTRIN) 2 mg capsule 2 mg.  tamsulosin (FLOMAX) 0.4 mg capsule 0.4 mg.      sertraline (ZOLOFT) 50 mg tablet Take  by mouth daily.  aspirin (ASPIRIN) 325 mg tablet Take 325 mg by mouth daily.  cholecalciferol (VITAMIN D3) 1,000 unit cap Take 1 Cap by mouth daily. 30 Cap 5    metFORMIN (GLUCOPHAGE) 500 mg tablet Take 1 Tab by mouth two (2) times daily (with meals). 60 Tab 5    clopidogrel (PLAVIX) 75 mg tab Take 1 Tab by mouth daily. 90 Tab 0    atorvastatin (LIPITOR) 80 mg tablet Take 1 Tab by mouth daily. 30 Tab 5    hydroCHLOROthiazide (HYDRODIURIL) 25 mg tablet Take 1 Tab by mouth daily. 30 Tab 5    losartan (COZAAR) 100 mg tablet Take 1 Tab by mouth daily. 30 Tab 5     Past Surgical History:   Procedure Laterality Date    HX KNEE ARTHROSCOPY      HX PROSTATECTOMY  11/16/2016    Robotic-assisted laparoscopic radical prostatectomy. Robotic-assisted laparoscopic bilateral pelvic lymph node dissection  .Dissection was very difficult, bladder neck repair/anastamosis complex and time for this was >50% of time ordinarily required for this procedure.     WY COLONOSCOPY FLX DX W/COLLJ SPEC WHEN PFRMD  4-30-01    papules/ Christiano    WY COLONOSCOPY FLX DX W/COLLJ SPEC WHEN PFRMD      normal . Dr. Radha Baig       ROS:  Constitutional: Negative for fever, chills, or fatigue  Neurological: Negative for headache, dizziness, visual disturbance, or loss of conciousness  Respiratory: Negative for SOB, hemoptysis, or wheezing  Cardiovascular: Negative for chest pain, palpitation, or leg swelling  Gastrointestinal: Negative for abdominal pain, nausea, vomiting, diarrhea, blood in stool, melena, or heartburn  Musculoskeletal: Negative for falls      Physical Exam:  Visit Vitals    /80 (BP 1 Location: Left arm, BP Patient Position: Sitting)    Pulse 74    Temp 98.1 °F (36.7 °C) (Oral)    Resp 18    Ht 5' 4\" (1.626 m)    Wt 183 lb 9.6 oz (83.3 kg)    SpO2 98%    BMI 31.51 kg/m2       General: Obese black male, a & o x 3, afebrile, well-nourished, interacting appropriately, in no acute distress  Skin: warm and dry, no rashes , no bruises  Neck: supple, symmetrical, no thyromegaly  Respiratory: symmetrical chest expansion, lung sounds clear bilaterally, good air entry, good respiratory effort, no wheezes or crackles  Cardiovascular: normal S1S2, regular rate and rhythm, no murmurs, pulses palpable, no thrill, no carotid or abdominal bruits, no JVD  Abdomen: non-distended, normoactive bowel sounds x 4 quadrants, soft, non-tender to palpation  Extremity: No edema noted  Neurological: CN 2 to 12 intact with no focal deficits noted        Assessment/Plan:    ICD-10-CM ICD-9-CM    1. Hospital discharge follow-up Z09 [facility] discharge follow up was completed at the office today. 2. Type 2 diabetes mellitus with hyperglycemia, without long-term current use of insulin (HCC) E11.65 250.00 Continue Metformin and he was counseled on diabetic diet. metFORMIN (GLUCOPHAGE) 500 mg tablet     790.29 HEMOGLOBIN A1C W/O EAG   3. Cerebrovascular accident (CVA), unspecified mechanism ([facility] [address] I63.9 434.91 Continue  daily, Plavix 75 mg daily, and Lipitor 80 mg daily. aspirin (ASPIRIN) 325 mg tablet      clopidogrel (PLAVIX) 75 mg tab   4. Pure hypercholesterolemia E78.00 272.0 Continue Lipitor 80 mfg daily and he was counseled on low fat diet. atorvastatin (LIPITOR) 80 mg tablet      LIPID PANEL   5.  Essential hypertension I10 401.9 Controlled  Continue current meds and he was counseled on low salt diet. hydroCHLOROthiazide (HYDRODIURIL) 25 mg tablet      losartan (COZAAR) 100 mg tablet      CBC WITH AUTOMATED DIFF      METABOLIC PANEL, COMPREHENSIVE      T4, FREE      TSH 3RD GENERATION   6. Vitamin D deficiency E55.9 268.9 ergocalciferol (ERGOCALCIFEROL) 50,000 unit capsule      VITAMIN D, 25 HYDROXY         Orders Placed This Encounter    CBC WITH AUTOMATED DIFF     Standing Status:   Future     Standing Expiration Date:   2018    HEMOGLOBIN A1C W/O EAG     Standing Status:   Future     Standing Expiration Date:   3/7/2019    LIPID PANEL     Standing Status:   Future     Standing Expiration Date:   6482    METABOLIC PANEL, COMPREHENSIVE     Standing Status:   Future     Standing Expiration Date:   2018    T4, FREE     Standing Status:   Future     Standing Expiration Date:   2018    TSH 3RD GENERATION     Standing Status:   Future     Standing Expiration Date:   2018    VITAMIN D, 25 HYDROXY     Standing Status:   Future     Standing Expiration Date:   2018    clopidogrel (PLAVIX) 75 mg tab     Si mg.  terazosin (HYTRIN) 2 mg capsule     Si mg.  tamsulosin (FLOMAX) 0.4 mg capsule     Si.4 mg.    DISCONTD: sertraline (ZOLOFT) 50 mg tablet     Sig: Take  by mouth daily.  DISCONTD: aspirin (ASPIRIN) 325 mg tablet     Sig: Take 325 mg by mouth daily.  sertraline (ZOLOFT) 50 mg tablet     Sig: Take 1 Tab by mouth daily. Dispense:  30 Tab     Refill:  3    aspirin (ASPIRIN) 325 mg tablet     Sig: Take 1 Tab by mouth daily. Dispense:  90 Tab     Refill:  1    metFORMIN (GLUCOPHAGE) 500 mg tablet     Sig: Take 1 Tab by mouth two (2) times daily (with meals). Dispense:  60 Tab     Refill:  5    clopidogrel (PLAVIX) 75 mg tab     Sig: Take 1 Tab by mouth daily. Dispense:  90 Tab     Refill:  0    atorvastatin (LIPITOR) 80 mg tablet     Sig: Take 1 Tab by mouth daily. Dispense:  30 Tab     Refill:  5    hydroCHLOROthiazide (HYDRODIURIL) 25 mg tablet     Sig: Take 1 Tab by mouth daily. Dispense:  30 Tab     Refill:  5    losartan (COZAAR) 100 mg tablet     Sig: Take 1 Tab by mouth daily. Dispense:  30 Tab     Refill:  5    ergocalciferol (ERGOCALCIFEROL) 50,000 unit capsule     Sig: Take 1 Cap by mouth every seven (7) days. Dispense:  6 Cap     Refill:  3       Review of records of recent admission at [facility] was done by me. Recent CT Head, MRI Brain, Carotid duplex, and Echo was reviewed. Additional Notes: Discussed today's diagnosis, treatment plans. Discussed medication indications and side effects. After Visit Summary: Discussed provided printed patient instructions. Answered questions accordingly. Follow-up Disposition: As previously scheduled        Sharon Tim DO, MPH  Internal Medicine        Total time taken for today's visit was 40 minutes with greater than 50% of time spent involved in counseling and coordination of care as outlined above.

## 2018-03-06 NOTE — MR AVS SNAPSHOT
[address] [place] [address] 
 
 
 [address], [place] [address] [facility] [address] 
[phone] Patient: Radhames Hidalgo MRN:  [id number] Visit Information Date & Time Provider Department Dept. Phone Encounter #  
 3/6/2018 10:00  [address] [facility] [phone] [id number] Follow-up Instructions Return in about 3 months (around 6/6/2018) for Labs 1 week before. Your Appointments 4/25/2018 11:30 AM  
ESTABLISHED PATIENT with Ceci Patton MD  
[facility]) Appt Note: RTC in 6 months with Dr. Richardson Holding  with SD psa  
 [address], [place] [address] [facility] [address]  
  
   
 [address], [facility] [address] Upcoming Health Maintenance Date Due  
 GLAUCOMA SCREENING Q2Y 4/23/2012 EYE EXAM RETINAL OR DILATED Q1 3/3/2015 HEMOGLOBIN A1C Q6M 5/14/2018 FOOT EXAM Q1 6/12/2018 MICROALBUMIN Q1 11/14/2018 LIPID PANEL Q1 11/14/2018 MEDICARE YEARLY EXAM 11/22/2018 COLONOSCOPY 11/21/2021 DTaP/Tdap/Td series (2 - Td) 11/14/2027 Allergies as of 3/6/2018  Review Complete On: 3/6/2018 By: Aubree Macario LPN No Known Allergies Current Immunizations  Reviewed on 7/12/2012 Name Date ZZZ-RETIRED (DO NOT USE) Pneumococcal Vaccine (Unspecified Type) 7/12/2012 Not reviewed this visit You Were Diagnosed With   
  
 Codes Comments Hospital discharge follow-up    -  Primary ICD-10-CM: [address] ICD-9-CM: V67.59 Type 2 diabetes mellitus with hyperglycemia, without long-term current use of insulin (HCC)     ICD-10-CM: E11.65 ICD-9-CM: 250.00, 790.29 Cerebrovascular accident (CVA), unspecified mechanism ([facility] [address]     ICD-10-CM: I63.9 ICD-9-CM: 434.91   
 Pure hypercholesterolemia     ICD-10-CM: E78.00 ICD-9-CM: 272.0 Essential hypertension     ICD-10-CM: I10 
ICD-9-CM: 401.9 Vitamin D deficiency     ICD-10-CM: E55.9 ICD-9-CM: 268.9 Vitals Height(growth percentile) Smoking Status 5' 4\" (1.626 m) Never Smoker Preferred Pharmacy Pharmacy Name Phone [facility] [place] [id number] [address] [phone] Your Updated Medication List  
  
   
This list is accurate as of 3/6/18 10:59 AM.  Always use your most recent med list.  
  
  
  
  
 aspirin 325 mg tablet Commonly known as:  ASPIRIN Take 1 Tab by mouth daily. atorvastatin 80 mg tablet Commonly known as:  LIPITOR Take 1 Tab by mouth daily. cholecalciferol 1,000 unit Cap Commonly known as:  VITAMIN D3 Take 1 Cap by mouth daily. * clopidogrel 75 mg Tab Commonly known as:  PLAVIX 75 mg.  
  
 * clopidogrel 75 mg Tab Commonly known as:  PLAVIX Take 1 Tab by mouth daily. ergocalciferol 50,000 unit capsule Commonly known as:  ERGOCALCIFEROL Take 1 Cap by mouth every seven (7) days. hydroCHLOROthiazide 25 mg tablet Commonly known as:  HYDRODIURIL Take 1 Tab by mouth daily. losartan 100 mg tablet Commonly known as:  COZAAR Take 1 Tab by mouth daily. metFORMIN 500 mg tablet Commonly known as:  GLUCOPHAGE Take 1 Tab by mouth two (2) times daily (with meals). sertraline 50 mg tablet Commonly known as:  ZOLOFT Take 1 Tab by mouth daily. tamsulosin 0.4 mg capsule Commonly known as:  FLOMAX  
0.4 mg.  
  
 terazosin 2 mg capsule Commonly known as:  HYTRIN  
2 mg. * Notice: This list has 2 medication(s) that are the same as other medications prescribed for you. Read the directions carefully, and ask your doctor or other care provider to review them with you. Prescriptions Printed Refills  
 sertraline (ZOLOFT) 50 mg tablet 3 Sig: Take 1 Tab by mouth daily. Class: Print  Route: Oral  
 aspirin (ASPIRIN) 325 mg tablet 1  
 Sig: Take 1 Tab by mouth daily. Class: Print Route: Oral  
 metFORMIN (GLUCOPHAGE) 500 mg tablet 5 Sig: Take 1 Tab by mouth two (2) times daily (with meals). Class: Print Route: Oral  
 clopidogrel (PLAVIX) 75 mg tab 0 Sig: Take 1 Tab by mouth daily. Class: Print Route: Oral  
 atorvastatin (LIPITOR) 80 mg tablet 5 Sig: Take 1 Tab by mouth daily. Class: Print Route: Oral  
 hydroCHLOROthiazide (HYDRODIURIL) 25 mg tablet 5 Sig: Take 1 Tab by mouth daily. Class: Print Route: Oral  
 losartan (COZAAR) 100 mg tablet 5 Sig: Take 1 Tab by mouth daily. Class: Print Route: Oral  
 ergocalciferol (ERGOCALCIFEROL) 50,000 unit capsule 3 Sig: Take 1 Cap by mouth every seven (7) days. Class: Print Route: Oral  
  
Follow-up Instructions Return in about 3 months (around 6/6/2018) for Labs 1 week before. To-Do List   
 06/04/2018 Lab:  CBC WITH AUTOMATED DIFF   
  
 06/04/2018 Lab:  HEMOGLOBIN A1C W/O EAG   
  
 06/04/2018 Lab:  LIPID PANEL   
  
 06/04/2018 Lab:  METABOLIC PANEL, COMPREHENSIVE   
  
 06/04/2018 Lab:  T4, FREE   
  
 06/04/2018 Lab:  TSH 3RD GENERATION   
  
 06/04/2018 Lab:  VITAMIN D, 25 HYDROXY Patient Instructions Learning About an Ischemic Stroke What is an ischemic stroke? An ischemic (say \"zcn-FOK-kejl\") stroke occurs when a blood clot blocks a blood vessel in the brain. This means that blood cannot flow to some part of the brain. Without blood and the oxygen it carries, this part of the brain starts to die. The part of the body controlled by the damaged area of the brain cannot work properly. This is different from a hemorrhagic (say \"rot-tjp-HE-jick\") stroke, which happens when a blood vessel in the brain has burst open or has started to leak. The brain damage from a stroke starts within minutes. Quick treatment can help limit damage to the brain and make recovery more likely. People who have had a stroke may have a hard time talking, understanding things, and making decisions. They may have to relearn daily activities, such as how to eat, bathe, and dress. How well someone recovers from a stroke depends on how quickly the person gets to the hospital, where in the brain the stroke happened, and how severe it was. Training and therapy also make a difference in how well people recover. What are the symptoms? If you have any of these symptoms, call 911 or other emergency services right away: 
· You have symptoms of a stroke. These may include: 
¨ Sudden numbness, tingling, weakness, or loss of movement in your face, arm, or leg, especially on only one side of your body. ¨ Sudden vision changes. ¨ Sudden trouble speaking. ¨ Sudden confusion or trouble understanding simple statements. ¨ Sudden problems with walking or balance. ¨ A sudden, severe headache that is different from past headaches. See your doctor if you have symptoms that seem like a stroke, even if they go away quickly. You may have had a transient ischemic attack (TIA), sometimes called a mini-stroke. A TIA is a warning that a stroke may happen soon. Getting early treatment for a TIA can help prevent a stroke. What causes an ischemic stroke? An ischemic stroke is caused by a blood clot that blocks blood flow in the brain. The most common causes of blood clots include: 
· Hardening of the arteries (atherosclerosis). This is caused by high blood pressure, diabetes, high cholesterol, or smoking. · Atrial fibrillation. How is ischemic stroke treated? You may have to take several medicines, depending on what caused your stroke. Ask your doctor if a stroke rehab program is right for you. Rehab increases your chances of getting back some of the abilities you lost. 
How can you prevent another stroke? · Work with your doctor to treat any health problems you have.  High blood pressure, high cholesterol, atrial fibrillation, and diabetes all raise your chances of having a stroke. · Be safe with medicines. Take your medicine exactly as prescribed. Call your doctor if you think you are having a problem with your medicine. · Have a healthy lifestyle. ¨ Do not smoke or allow others to smoke around you. If you need help quitting, talk to your doctor about stop-smoking programs and medicines. These can increase your chances of quitting for good. Smoking makes a stroke more likely. ¨ Limit alcohol to 2 drinks a day for men and 1 drink a day for women. ¨ Lose weight if you need to. A healthy weight will help you keep your heart and body healthy. ¨ Be active. Ask your doctor what type and level of activity is safe for you. ¨ Eat heart-healthy foods, like fruits, vegetables, and high-fiber foods. Follow-up care is a key part of your treatment and safety. Be sure to make and go to all appointments, and call your doctor if you are having problems. It's also a good idea to know your test results and keep a list of the medicines you take. Where can you learn more? Go to http://davidDataCrowdkayla.info/. Enter D161 in the search box to learn more about \"Learning About an Ischemic Stroke. \" Current as of: March 20, 2017 Content Version: 11.4 © 1135-1699 Healthwise, Nooga.com. Care instructions adapted under license by [employer] (which disclaims liability or warranty for this information). If you have questions about a medical condition or this instruction, always ask your healthcare professional. [place] [address] any warranty or liability for your use of this information. DASH Diet: Care Instructions Your Care Instructions The DASH diet is an eating plan that can help lower your blood pressure. DASH stands for Dietary Approaches to Stop Hypertension. Hypertension is high blood pressure. The DASH diet focuses on eating foods that are high in calcium, potassium, and magnesium. These nutrients can lower blood pressure. The foods that are highest in these nutrients are fruits, vegetables, low-fat dairy products, nuts, seeds, and legumes. But taking calcium, potassium, and magnesium supplements instead of eating foods that are high in those nutrients does not have the same effect. The DASH diet also includes whole grains, fish, and poultry. The DASH diet is one of several lifestyle changes your doctor may recommend to lower your high blood pressure. Your doctor may also want you to decrease the amount of sodium in your diet. Lowering sodium while following the DASH diet can lower blood pressure even further than just the DASH diet alone. Follow-up care is a key part of your treatment and safety. Be sure to make and go to all appointments, and call your doctor if you are having problems. It's also a good idea to know your test results and keep a list of the medicines you take. How can you care for yourself at home? Following the DASH diet · Eat 4 to 5 servings of fruit each day. A serving is 1 medium-sized piece of fruit, ½ cup chopped or canned fruit, 1/4 cup dried fruit, or 4 ounces (½ cup) of fruit juice. Choose fruit more often than fruit juice. · Eat 4 to 5 servings of vegetables each day. A serving is 1 cup of lettuce or raw leafy vegetables, ½ cup of chopped or cooked vegetables, or 4 ounces (½ cup) of vegetable juice. Choose vegetables more often than vegetable juice. · Get 2 to 3 servings of low-fat and fat-free dairy each day. A serving is 8 ounces of milk, 1 cup of yogurt, or 1 ½ ounces of cheese. · Eat 6 to 8 servings of grains each day. A serving is 1 slice of bread, 1 ounce of dry cereal, or ½ cup of cooked rice, pasta, or cooked cereal. Try to choose whole-grain products as much as possible. · Limit lean meat, poultry, and fish to 2 servings each day.  A serving is 3 ounces, about the size of a deck of cards. · Eat 4 to 5 servings of nuts, seeds, and legumes (cooked dried beans, lentils, and split peas) each week. A serving is 1/3 cup of nuts, 2 tablespoons of seeds, or ½ cup of cooked beans or peas. · Limit fats and oils to 2 to 3 servings each day. A serving is 1 teaspoon of vegetable oil or 2 tablespoons of salad dressing. · Limit sweets and added sugars to 5 servings or less a week. A serving is 1 tablespoon jelly or jam, ½ cup sorbet, or 1 cup of lemonade. · Eat less than 2,300 milligrams (mg) of sodium a day. If you limit your sodium to 1,500 mg a day, you can lower your blood pressure even more. Tips for success · Start small. Do not try to make dramatic changes to your diet all at once. You might feel that you are missing out on your favorite foods and then be more likely to not follow the plan. Make small changes, and stick with them. Once those changes become habit, add a few more changes. · Try some of the following: ¨ Make it a goal to eat a fruit or vegetable at every meal and at snacks. This will make it easy to get the recommended amount of fruits and vegetables each day. ¨ Try yogurt topped with fruit and nuts for a snack or healthy dessert. ¨ Add lettuce, tomato, cucumber, and onion to sandwiches. ¨ Combine a ready-made pizza crust with low-fat mozzarella cheese and lots of vegetable toppings. Try using tomatoes, squash, spinach, broccoli, carrots, cauliflower, and onions. ¨ Have a variety of cut-up vegetables with a low-fat dip as an appetizer instead of chips and dip. ¨ Sprinkle sunflower seeds or chopped almonds over salads. Or try adding chopped walnuts or almonds to cooked vegetables. ¨ Try some vegetarian meals using beans and peas. Add garbanzo or kidney beans to salads. Make burritos and tacos with mashed barboza beans or black beans. Where can you learn more? Go to http://hernandez-kayla.info/. Enter Q139 in the search box to learn more about \"DASH Diet: Care Instructions. \" Current as of: September 21, 2016 Content Version: 11.4 © 8470-7929 miLibris. Care instructions adapted under license by [employer] (which disclaims liability or warranty for this information). If you have questions about a medical condition or this instruction, always ask your healthcare professional. [address] any warranty or liability for your use of this information. Learning About Diabetes Food Guidelines Your Care Instructions Meal planning is important to manage diabetes. It helps keep your blood sugar at a target level (which you set with your doctor). You don't have to eat special foods. You can eat what your family eats, including sweets once in a while. But you do have to pay attention to how often you eat and how much you eat of certain foods. You may want to work with a dietitian or a certified diabetes educator (CDE) to help you plan meals and snacks. A dietitian or CDE can also help you lose weight if that is one of your goals. What should you know about eating carbs? Managing the amount of carbohydrate (carbs) you eat is an important part of healthy meals when you have diabetes. Carbohydrate is found in many foods. · Learn which foods have carbs. And learn the amounts of carbs in different foods. ¨ Bread, cereal, pasta, and rice have about 15 grams of carbs in a serving. A serving is 1 slice of bread (1 ounce), ½ cup of cooked cereal, or 1/3 cup of cooked pasta or rice. ¨ Fruits have 15 grams of carbs in a serving. A serving is 1 small fresh fruit, such as an apple or orange; ½ of a banana; ½ cup of cooked or canned fruit; ½ cup of fruit juice; 1 cup of melon or raspberries; or 2 tablespoons of dried fruit. ¨ Milk and no-sugar-added yogurt have 15 grams of carbs in a serving. A serving is 1 cup of milk or 2/3 cup of no-sugar-added yogurt. ¨ Starchy vegetables have 15 grams of carbs in a serving. A serving is ½ cup of mashed potatoes or sweet potato; 1 cup winter squash; ½ of a small baked potato; ½ cup of cooked beans; or ½ cup cooked corn or green peas. · Learn how much carbs to eat each day and at each meal. A dietitian or CDE can teach you how to keep track of the amount of carbs you eat. This is called carbohydrate counting. · If you are not sure how to count carbohydrate grams, use the Plate Method to plan meals. It is a good, quick way to make sure that you have a balanced meal. It also helps you spread carbs throughout the day. ¨ Divide your plate by types of foods. Put non-starchy vegetables on half the plate, meat or other protein food on one-quarter of the plate, and a grain or starchy vegetable in the final quarter of the plate. To this you can add a small piece of fruit and 1 cup of milk or yogurt, depending on how many carbs you are supposed to eat at a meal. 
· Try to eat about the same amount of carbs at each meal. Do not \"save up\" your daily allowance of carbs to eat at one meal. 
· Proteins have very little or no carbs per serving. Examples of proteins are beef, chicken, turkey, fish, eggs, tofu, cheese, cottage cheese, and peanut butter. A serving size of meat is 3 ounces, which is about the size of a deck of cards. Examples of meat substitute serving sizes (equal to 1 ounce of meat) are 1/4 cup of cottage cheese, 1 egg, 1 tablespoon of peanut butter, and ½ cup of tofu. How can you eat out and still eat healthy? · Learn to estimate the serving sizes of foods that have carbohydrate. If you measure food at home, it will be easier to estimate the amount in a serving of restaurant food. · If the meal you order has too much carbohydrate (such as potatoes, corn, or baked beans), ask to have a low-carbohydrate food instead. Ask for a salad or green vegetables.  
· If you use insulin, check your blood sugar before and after eating out to help you plan how much to eat in the future. · If you eat more carbohydrate at a meal than you had planned, take a walk or do other exercise. This will help lower your blood sugar. What else should you know? · Limit saturated fat, such as the fat from meat and dairy products. This is a healthy choice because people who have diabetes are at higher risk of heart disease. So choose lean cuts of meat and nonfat or low-fat dairy products. Use olive or canola oil instead of butter or shortening when cooking. · Don't skip meals. Your blood sugar may drop too low if you skip meals and take insulin or certain medicines for diabetes. · Check with your doctor before you drink alcohol. Alcohol can cause your blood sugar to drop too low. Alcohol can also cause a bad reaction if you take certain diabetes medicines. Follow-up care is a key part of your treatment and safety. Be sure to make and go to all appointments, and call your doctor if you are having problems. It's also a good idea to know your test results and keep a list of the medicines you take. Where can you learn more? Go to http://david-kayla.info/. Enter B363 in the search box to learn more about \"Learning About Diabetes Food Guidelines. \" Current as of: March 13, 2017 Content Version: 11.4 © 0648-5265 Healthwise, Incorporated. Care instructions adapted under license by [employer] (which disclaims liability or warranty for this information). If you have questions about a medical condition or this instruction, always ask your healthcare professional. [place] [address] any warranty or liability for your use of this information. Starting a Weight Loss Plan: Care Instructions Your Care Instructions If you are thinking about losing weight, it can be hard to know where to start.  Your doctor can help you set up a weight loss plan that best meets your needs. You may want to take a class on nutrition or exercise, or join a weight loss support group. If you have questions about how to make changes to your eating or exercise habits, ask your doctor about seeing a registered dietitian or an exercise specialist. 
It can be a big challenge to lose weight. But you do not have to make huge changes at once. Make small changes, and stick with them. When those changes become habit, add a few more changes. If you do not think you are ready to make changes right now, try to pick a date in the future. Make an appointment to see your doctor to discuss whether the time is right for you to start a plan. Follow-up care is a key part of your treatment and safety. Be sure to make and go to all appointments, and call your doctor if you are having problems. It's also a good idea to know your test results and keep a list of the medicines you take. How can you care for yourself at home? · Set realistic goals. Many people expect to lose much more weight than is likely. A weight loss of 5% to 10% of your body weight may be enough to improve your health. · Get family and friends involved to provide support. Talk to them about why you are trying to lose weight, and ask them to help. They can help by participating in exercise and having meals with you, even if they may be eating something different. · Find what works best for you. If you do not have time or do not like to cook, a program that offers meal replacement bars or shakes may be better for you. Or if you like to prepare meals, finding a plan that includes daily menus and recipes may be best. 
· Ask your doctor about other health professionals who can help you achieve your weight loss goals. ¨ A dietitian can help you make healthy changes in your diet.  
¨ An exercise specialist or  can help you develop a safe and effective exercise program. 
 ¨ A counselor or psychiatrist can help you cope with issues such as depression, anxiety, or family problems that can make it hard to focus on weight loss. · Consider joining a support group for people who are trying to lose weight. Your doctor can suggest groups in your area. Where can you learn more? Go to http://david-kayla.info/. Enter Z770 in the search box to learn more about \"Starting a Weight Loss Plan: Care Instructions. \" Current as of: October 13, 2016 Content Version: 11.4 © 6862-0161 authorGEN. Care instructions adapted under license by [employer] (which disclaims liability or warranty for this information). If you have questions about a medical condition or this instruction, always ask your healthcare professional. [address] any warranty or liability for your use of this information. Introducing [facility]! [employer] introduces ClaimSync patient portal. Now you can access parts of your medical record, email your doctor's office, and request medication refills online. 1. In your internet browser, go to [email or web address] 2. Click on the First Time User? Click Here link in the Sign In box. You will see the New Member Sign Up page. 3. Enter your ClaimSync Access Code exactly as it appears below. You will not need to use this code after youve completed the sign-up process. If you do not sign up before the expiration date, you must request a new code. · ClaimSync Access Code: [email or web address] Expires: 5/10/2018  9:38 AM 
 
4. Enter the last four digits of your Social Security Number (xxxx) and Date of Birth (mm/dd/yyyy) as indicated and click Submit. You will be taken to the next sign-up page. 5. Create a ClaimSync ID. This will be your ClaimSync login ID and cannot be changed, so think of one that is secure and easy to remember. 6. Create a NeoVista password. You can change your password at any time. 7. Enter your Password Reset Question and Answer. This can be used at a later time if you forget your password. 8. Enter your e-mail address. You will receive e-mail notification when new information is available in [address] 9. Click Sign Up. You can now view and download portions of your medical record. 10. Click the Download Summary menu link to download a portable copy of your medical information. If you have questions, please visit the Frequently Asked Questions section of the NeoVista website. Remember, NeoVista is NOT to be used for urgent needs. For medical emergencies, dial 911. Now available from your iPhone and Android! Please provide this summary of care documentation to your next provider. Your primary care clinician is listed as Aspen Harris. If you have any questions after today's visit, please call [phone].

## 2018-03-06 NOTE — PATIENT INSTRUCTIONS
Learning About an Ischemic Stroke  What is an ischemic stroke? An ischemic (say \"Suzanne\") stroke occurs when a blood clot blocks a blood vessel in the brain. This means that blood cannot flow to some part of the brain. Without blood and the oxygen it carries, this part of the brain starts to die. The part of the body controlled by the damaged area of the brain cannot work properly. This is different from a hemorrhagic (say \"pta-iab-HW-nereidack\") stroke, which happens when a blood vessel in the brain has burst open or has started to leak. The brain damage from a stroke starts within minutes. Quick treatment can help limit damage to the brain and make recovery more likely. People who have had a stroke may have a hard time talking, understanding things, and making decisions. They may have to relearn daily activities, such as how to eat, bathe, and dress. How well someone recovers from a stroke depends on how quickly the person gets to the hospital, where in the brain the stroke happened, and how severe it was. Training and therapy also make a difference in how well people recover. What are the symptoms? If you have any of these symptoms, call 911 or other emergency services right away:  · You have symptoms of a stroke. These may include:  ¨ Sudden numbness, tingling, weakness, or loss of movement in your face, arm, or leg, especially on only one side of your body. ¨ Sudden vision changes. ¨ Sudden trouble speaking. ¨ Sudden confusion or trouble understanding simple statements. ¨ Sudden problems with walking or balance. ¨ A sudden, severe headache that is different from past headaches. See your doctor if you have symptoms that seem like a stroke, even if they go away quickly. You may have had a transient ischemic attack (TIA), sometimes called a mini-stroke. A TIA is a warning that a stroke may happen soon. Getting early treatment for a TIA can help prevent a stroke.   What causes an ischemic stroke? An ischemic stroke is caused by a blood clot that blocks blood flow in the brain. The most common causes of blood clots include:  · Hardening of the arteries (atherosclerosis). This is caused by high blood pressure, diabetes, high cholesterol, or smoking. · Atrial fibrillation. How is ischemic stroke treated? You may have to take several medicines, depending on what caused your stroke. Ask your doctor if a stroke rehab program is right for you. Rehab increases your chances of getting back some of the abilities you lost.  How can you prevent another stroke? · Work with your doctor to treat any health problems you have. High blood pressure, high cholesterol, atrial fibrillation, and diabetes all raise your chances of having a stroke. · Be safe with medicines. Take your medicine exactly as prescribed. Call your doctor if you think you are having a problem with your medicine. · Have a healthy lifestyle. ¨ Do not smoke or allow others to smoke around you. If you need help quitting, talk to your doctor about stop-smoking programs and medicines. These can increase your chances of quitting for good. Smoking makes a stroke more likely. ¨ Limit alcohol to 2 drinks a day for men and 1 drink a day for women. ¨ Lose weight if you need to. A healthy weight will help you keep your heart and body healthy. ¨ Be active. Ask your doctor what type and level of activity is safe for you. ¨ Eat heart-healthy foods, like fruits, vegetables, and high-fiber foods. Follow-up care is a key part of your treatment and safety. Be sure to make and go to all appointments, and call your doctor if you are having problems. It's also a good idea to know your test results and keep a list of the medicines you take. Where can you learn more? Go to http://david-kayla.info/. Enter D161 in the search box to learn more about \"Learning About an Ischemic Stroke. \"  Current as of: March 20, 2017  Content Version: 11.4  © 1767-5398 Healthwise, [employer]. Care instructions adapted under license by [employer] (which disclaims liability or warranty for this information). If you have questions about a medical condition or this instruction, always ask your healthcare professional. [address] any warranty or liability for your use of this information. DASH Diet: Care Instructions  Your Care Instructions    The DASH diet is an eating plan that can help lower your blood pressure. DASH stands for Dietary Approaches to Stop Hypertension. Hypertension is high blood pressure. The DASH diet focuses on eating foods that are high in calcium, potassium, and magnesium. These nutrients can lower blood pressure. The foods that are highest in these nutrients are fruits, vegetables, low-fat dairy products, nuts, seeds, and legumes. But taking calcium, potassium, and magnesium supplements instead of eating foods that are high in those nutrients does not have the same effect. The DASH diet also includes whole grains, fish, and poultry. The DASH diet is one of several lifestyle changes your doctor may recommend to lower your high blood pressure. Your doctor may also want you to decrease the amount of sodium in your diet. Lowering sodium while following the DASH diet can lower blood pressure even further than just the DASH diet alone. Follow-up care is a key part of your treatment and safety. Be sure to make and go to all appointments, and call your doctor if you are having problems. It's also a good idea to know your test results and keep a list of the medicines you take. How can you care for yourself at home? Following the DASH diet  · Eat 4 to 5 servings of fruit each day. A serving is 1 medium-sized piece of fruit, ½ cup chopped or canned fruit, 1/4 cup dried fruit, or 4 ounces (½ cup) of fruit juice. Choose fruit more often than fruit juice. · Eat 4 to 5 servings of vegetables each day.  A serving is 1 cup of lettuce or raw leafy vegetables, ½ cup of chopped or cooked vegetables, or 4 ounces (½ cup) of vegetable juice. Choose vegetables more often than vegetable juice. · Get 2 to 3 servings of low-fat and fat-free dairy each day. A serving is 8 ounces of milk, 1 cup of yogurt, or 1 ½ ounces of cheese. · Eat 6 to 8 servings of grains each day. A serving is 1 slice of bread, 1 ounce of dry cereal, or ½ cup of cooked rice, pasta, or cooked cereal. Try to choose whole-grain products as much as possible. · Limit lean meat, poultry, and fish to 2 servings each day. A serving is 3 ounces, about the size of a deck of cards. · Eat 4 to 5 servings of nuts, seeds, and legumes (cooked dried beans, lentils, and split peas) each week. A serving is 1/3 cup of nuts, 2 tablespoons of seeds, or ½ cup of cooked beans or peas. · Limit fats and oils to 2 to 3 servings each day. A serving is 1 teaspoon of vegetable oil or 2 tablespoons of salad dressing. · Limit sweets and added sugars to 5 servings or less a week. A serving is 1 tablespoon jelly or jam, ½ cup sorbet, or 1 cup of lemonade. · Eat less than 2,300 milligrams (mg) of sodium a day. If you limit your sodium to 1,500 mg a day, you can lower your blood pressure even more. Tips for success  · Start small. Do not try to make dramatic changes to your diet all at once. You might feel that you are missing out on your favorite foods and then be more likely to not follow the plan. Make small changes, and stick with them. Once those changes become habit, add a few more changes. · Try some of the following:  ¨ Make it a goal to eat a fruit or vegetable at every meal and at snacks. This will make it easy to get the recommended amount of fruits and vegetables each day. ¨ Try yogurt topped with fruit and nuts for a snack or healthy dessert. ¨ Add lettuce, tomato, cucumber, and onion to sandwiches.   ¨ Combine a ready-made pizza crust with low-fat mozzarella cheese and lots of vegetable toppings. Try using tomatoes, squash, spinach, broccoli, carrots, cauliflower, and onions. ¨ Have a variety of cut-up vegetables with a low-fat dip as an appetizer instead of chips and dip. ¨ Sprinkle sunflower seeds or chopped almonds over salads. Or try adding chopped walnuts or almonds to cooked vegetables. ¨ Try some vegetarian meals using beans and peas. Add garbanzo or kidney beans to salads. Make burritos and tacos with mashed barboza beans or black beans. Where can you learn more? Go to http://davidAktinokayla.info/. Enter P114 in the search box to learn more about \"DASH Diet: Care Instructions. \"  Current as of: September 21, 2016  Content Version: 11.4  © 0437-9025 [employer]. Care instructions adapted under license by [employer] (which disclaims liability or warranty for this information). If you have questions about a medical condition or this instruction, always ask your healthcare professional. [address] any warranty or liability for your use of this information. Learning About Diabetes Food Guidelines  Your Care Instructions    Meal planning is important to manage diabetes. It helps keep your blood sugar at a target level (which you set with your doctor). You don't have to eat special foods. You can eat what your family eats, including sweets once in a while. But you do have to pay attention to how often you eat and how much you eat of certain foods. You may want to work with a dietitian or a certified diabetes educator (CDE) to help you plan meals and snacks. A dietitian or CDE can also help you lose weight if that is one of your goals. What should you know about eating carbs? Managing the amount of carbohydrate (carbs) you eat is an important part of healthy meals when you have diabetes. Carbohydrate is found in many foods. · Learn which foods have carbs.  And learn the amounts of carbs in different foods.  ¨ Bread, cereal, pasta, and rice have about 15 grams of carbs in a serving. A serving is 1 slice of bread (1 ounce), ½ cup of cooked cereal, or 1/3 cup of cooked pasta or rice. ¨ Fruits have 15 grams of carbs in a serving. A serving is 1 small fresh fruit, such as an apple or orange; ½ of a banana; ½ cup of cooked or canned fruit; ½ cup of fruit juice; 1 cup of melon or raspberries; or 2 tablespoons of dried fruit. ¨ Milk and no-sugar-added yogurt have 15 grams of carbs in a serving. A serving is 1 cup of milk or 2/3 cup of no-sugar-added yogurt. ¨ Starchy vegetables have 15 grams of carbs in a serving. A serving is ½ cup of mashed potatoes or sweet potato; 1 cup winter squash; ½ of a small baked potato; ½ cup of cooked beans; or ½ cup cooked corn or green peas. · Learn how much carbs to eat each day and at each meal. A dietitian or CDE can teach you how to keep track of the amount of carbs you eat. This is called carbohydrate counting. · If you are not sure how to count carbohydrate grams, use the Plate Method to plan meals. It is a good, quick way to make sure that you have a balanced meal. It also helps you spread carbs throughout the day. ¨ Divide your plate by types of foods. Put non-starchy vegetables on half the plate, meat or other protein food on one-quarter of the plate, and a grain or starchy vegetable in the final quarter of the plate. To this you can add a small piece of fruit and 1 cup of milk or yogurt, depending on how many carbs you are supposed to eat at a meal.  · Try to eat about the same amount of carbs at each meal. Do not \"save up\" your daily allowance of carbs to eat at one meal.  · Proteins have very little or no carbs per serving. Examples of proteins are beef, chicken, turkey, fish, eggs, tofu, cheese, cottage cheese, and peanut butter. A serving size of meat is 3 ounces, which is about the size of a deck of cards.  Examples of meat substitute serving sizes (equal to 1 ounce of meat) are 1/4 cup of cottage cheese, 1 egg, 1 tablespoon of peanut butter, and ½ cup of tofu. How can you eat out and still eat healthy? · Learn to estimate the serving sizes of foods that have carbohydrate. If you measure food at home, it will be easier to estimate the amount in a serving of restaurant food. · If the meal you order has too much carbohydrate (such as potatoes, corn, or baked beans), ask to have a low-carbohydrate food instead. Ask for a salad or green vegetables. · If you use insulin, check your blood sugar before and after eating out to help you plan how much to eat in the future. · If you eat more carbohydrate at a meal than you had planned, take a walk or do other exercise. This will help lower your blood sugar. What else should you know? · Limit saturated fat, such as the fat from meat and dairy products. This is a healthy choice because people who have diabetes are at higher risk of heart disease. So choose lean cuts of meat and nonfat or low-fat dairy products. Use olive or canola oil instead of butter or shortening when cooking. · Don't skip meals. Your blood sugar may drop too low if you skip meals and take insulin or certain medicines for diabetes. · Check with your doctor before you drink alcohol. Alcohol can cause your blood sugar to drop too low. Alcohol can also cause a bad reaction if you take certain diabetes medicines. Follow-up care is a key part of your treatment and safety. Be sure to make and go to all appointments, and call your doctor if you are having problems. It's also a good idea to know your test results and keep a list of the medicines you take. Where can you learn more? Go to http://david-kayla.info/. Enter I981 in the search box to learn more about \"Learning About Diabetes Food Guidelines. \"  Current as of: March 13, 2017  Content Version: 11.4  © 7460-9674 Healthwise, [employer].  Care instructions adapted under license by [facility] (which disclaims liability or warranty for this information). If you have questions about a medical condition or this instruction, always ask your healthcare professional. [address] any warranty or liability for your use of this information. Starting a Weight Loss Plan: Care Instructions  Your Care Instructions    If you are thinking about losing weight, it can be hard to know where to start. Your doctor can help you set up a weight loss plan that best meets your needs. You may want to take a class on nutrition or exercise, or join a weight loss support group. If you have questions about how to make changes to your eating or exercise habits, ask your doctor about seeing a registered dietitian or an exercise specialist.  It can be a big challenge to lose weight. But you do not have to make huge changes at once. Make small changes, and stick with them. When those changes become habit, add a few more changes. If you do not think you are ready to make changes right now, try to pick a date in the future. Make an appointment to see your doctor to discuss whether the time is right for you to start a plan. Follow-up care is a key part of your treatment and safety. Be sure to make and go to all appointments, and call your doctor if you are having problems. It's also a good idea to know your test results and keep a list of the medicines you take. How can you care for yourself at home? · Set realistic goals. Many people expect to lose much more weight than is likely. A weight loss of 5% to 10% of your body weight may be enough to improve your health. · Get family and friends involved to provide support. Talk to them about why you are trying to lose weight, and ask them to help. They can help by participating in exercise and having meals with you, even if they may be eating something different. · Find what works best for you.  If you do not have time or do not like to cook, a program that offers meal replacement bars or shakes may be better for you. Or if you like to prepare meals, finding a plan that includes daily menus and recipes may be best.  · Ask your doctor about other health professionals who can help you achieve your weight loss goals. ¨ A dietitian can help you make healthy changes in your diet. ¨ An exercise specialist or  can help you develop a safe and effective exercise program.  ¨ A counselor or psychiatrist can help you cope with issues such as depression, anxiety, or family problems that can make it hard to focus on weight loss. · Consider joining a support group for people who are trying to lose weight. Your doctor can suggest groups in your area. Where can you learn more? Go to http://david-kayla.info/. Enter K437 in the search box to learn more about \"Starting a Weight Loss Plan: Care Instructions. \"  Current as of: October 13, 2016  Content Version: 11.4  © 0412-7168 Healthwise, [employer]. Care instructions adapted under license by [employer] (which disclaims liability or warranty for this information). If you have questions about a medical condition or this instruction, always ask your healthcare professional. [place] [address] any warranty or liability for your use of this information.

## 2018-03-08 ENCOUNTER — TELEPHONE (OUTPATIENT)
Dept: FAMILY MEDICINE CLINIC | Age: 73
End: 2018-03-08

## 2018-03-19 ENCOUNTER — TELEPHONE (OUTPATIENT)
Dept: PULMONOLOGY | Age: 73
End: 2018-03-19

## 2018-03-19 NOTE — TELEPHONE ENCOUNTER
Returned Mrs. William's call, no answer, left message requesting return call when convenient. Pt's spouse returning call. Explained to her that she will need to call the sleep lab and schedule the HST prior to pt's follow up with Dr. Nils Abdi on 04/02/2018. Provided spouse with contact information for Sleep Lab. All questions answered and pt's spouse encouraged to call back with any additional questions.

## 2018-03-19 NOTE — TELEPHONE ENCOUNTER
Pts wife Aris Morrison called because pt was supposed to have in home sleep study and that has not been done yet. He has an appt coming up on 4/2/18 and she would like to know what the visit is for since the study has not been done yet. She also said that he does not have a cpap but we have a note in kellee stating that he does. Also, pt had a stroke on 2/27/18 and was in [place] for a few days.  Please call [phone]

## 2018-03-29 NOTE — TELEPHONE ENCOUNTER
C/o not eligible for benefits with [facility] MARISA and would like for doc to reprint all his rx's from 03/06/2018 or call them into [facility]. Pt states that he do not have any benefits and the [place] will not cover meds. Pt is requesting something to be complete today or atleast a phone call (pt sound very worried and panic like). Please advise asap.

## 2018-03-30 ENCOUNTER — TELEPHONE (OUTPATIENT)
Dept: FAMILY MEDICINE CLINIC | Age: 73
End: 2018-03-30

## 2018-03-30 DIAGNOSIS — I63.9 CEREBROVASCULAR ACCIDENT (CVA), UNSPECIFIED MECHANISM (HCC): Primary | ICD-10-CM

## 2018-03-30 NOTE — TELEPHONE ENCOUNTER
Patient is being d/c from [facility] and would to request order for in home therapy.    Pt can be reached at [phone]

## 2018-03-31 ENCOUNTER — HOME HEALTH ADMISSION (OUTPATIENT)
Dept: HOME HEALTH SERVICES | Facility: HOME HEALTH | Age: 73
End: 2018-03-31

## 2018-04-02 ENCOUNTER — HOME CARE VISIT (OUTPATIENT)
Dept: SCHEDULING | Facility: HOME HEALTH | Age: 73
End: 2018-04-02

## 2018-04-02 ENCOUNTER — TELEPHONE (OUTPATIENT)
Dept: FAMILY MEDICINE CLINIC | Age: 73
End: 2018-04-02

## 2018-04-02 DIAGNOSIS — I10 ESSENTIAL HYPERTENSION: ICD-10-CM

## 2018-04-02 DIAGNOSIS — E55.9 VITAMIN D DEFICIENCY: ICD-10-CM

## 2018-04-02 DIAGNOSIS — E11.65 TYPE 2 DIABETES MELLITUS WITH HYPERGLYCEMIA, WITHOUT LONG-TERM CURRENT USE OF INSULIN (HCC): ICD-10-CM

## 2018-04-02 NOTE — TELEPHONE ENCOUNTER
Requested Prescriptions     Pending Prescriptions Disp Refills    metFORMIN (GLUCOPHAGE) 500 mg tablet 180 Tab 1     Sig: Take 1 Tab by mouth two (2) times daily (with meals).  losartan (COZAAR) 100 mg tablet 90 Tab 1     Sig: Take 1 Tab by mouth daily.  hydroCHLOROthiazide (HYDRODIURIL) 25 mg tablet 90 Tab 1     Sig: Take 1 Tab by mouth daily.  ergocalciferol (ERGOCALCIFEROL) 50,000 unit capsule 12 Cap 1     Sig: Take 1 Cap by mouth every seven (7) days.  clopidogrel (PLAVIX) 75 mg tab       Si Tab. Wants printed prescriptions, 90 day supply    Pt request reprint these prescriptions. He took originalsl to the  [facility] and they were unable to process due to his [employer] has .  He did not want to drive back up there to pick them up

## 2018-04-02 NOTE — TELEPHONE ENCOUNTER
Last OV 3/6/2018  Next OV 6/11/2018  Last Refill  Metformin 3/6/2018  Losartan 3/6/2018  HCTZ 3/6/2018  Vit D 3/6/2018  Plavix 3/6/2018  Last Lab 11/21/2017    Previous Prescriptions were taking to [place] and was not able to get them filled due to insurance issues.

## 2018-04-02 NOTE — TELEPHONE ENCOUNTER
Coni Merino from [facility] stated he would not be admitted to them because he is already apart of [address] [place] [address]

## 2018-04-03 ENCOUNTER — TELEPHONE (OUTPATIENT)
Dept: FAMILY MEDICINE CLINIC | Age: 73
End: 2018-04-03

## 2018-04-03 ENCOUNTER — HOSPITAL ENCOUNTER (OUTPATIENT)
Dept: SLEEP MEDICINE | Age: 73
Discharge: HOME | End: 2018-04-03
Payer: MEDICARE

## 2018-04-03 PROCEDURE — 95806 SLEEP STUDY UNATT&RESP EFFT: CPT

## 2018-04-03 RX ORDER — HYDROCHLOROTHIAZIDE 25 MG/1
25 TABLET ORAL DAILY
Qty: 90 TAB | Refills: 2 | Status: SHIPPED | OUTPATIENT
Start: 2018-04-03 | End: 2019-03-25 | Stop reason: SDUPTHER

## 2018-04-03 RX ORDER — ERGOCALCIFEROL 1.25 MG/1
50000 CAPSULE ORAL
Qty: 12 CAP | Refills: 2 | Status: SHIPPED | OUTPATIENT
Start: 2018-04-03

## 2018-04-03 RX ORDER — LOSARTAN POTASSIUM 100 MG/1
100 TABLET ORAL DAILY
Qty: 90 TAB | Refills: 2 | Status: SHIPPED | OUTPATIENT
Start: 2018-04-03 | End: 2018-10-18 | Stop reason: SDUPTHER

## 2018-04-03 RX ORDER — METFORMIN HYDROCHLORIDE 500 MG/1
500 TABLET ORAL 2 TIMES DAILY WITH MEALS
Qty: 180 TAB | Refills: 2 | Status: SHIPPED | OUTPATIENT
Start: 2018-04-03 | End: 2018-10-18 | Stop reason: SDUPTHER

## 2018-04-03 RX ORDER — CLOPIDOGREL BISULFATE 75 MG/1
75 TABLET ORAL DAILY
Qty: 90 TAB | Refills: 2 | Status: SHIPPED | OUTPATIENT
Start: 2018-04-03 | End: 2018-04-25

## 2018-04-03 NOTE — TELEPHONE ENCOUNTER
Called patient left name and call back number. The call was to inform patient that his meds are ready for  at the .

## 2018-04-04 ENCOUNTER — TELEPHONE (OUTPATIENT)
Dept: PULMONOLOGY | Age: 73
End: 2018-04-04

## 2018-04-04 ENCOUNTER — HOSPITAL ENCOUNTER (OUTPATIENT)
Dept: SLEEP MEDICINE | Age: 73
Discharge: HOME | End: 2018-04-04
Payer: MEDICARE

## 2018-04-04 DIAGNOSIS — G47.33 OSA (OBSTRUCTIVE SLEEP APNEA): Primary | ICD-10-CM

## 2018-04-04 NOTE — TELEPHONE ENCOUNTER
The pt. was contacted to cancel his appt. 4/6 secondary to only having the Home Sleep Study done last evening. I told him that we would reschedule once he had the CPap, if that was a need. He asked how he would get it and I informed him that an order would be sent to a company who would bring it to his home and show him how to use it. [address] up and told me that the companies now have the pt's come to them to get the CPap. The pt. requested he talk to the pt. who has all of the knowledge. The call was passed to LITZY Heredia Sand Forkjanny.

## 2018-04-20 ENCOUNTER — TELEPHONE (OUTPATIENT)
Dept: FAMILY MEDICINE CLINIC | Age: 73
End: 2018-04-20

## 2018-04-20 NOTE — TELEPHONE ENCOUNTER
Cara Boxer from [facility] calling to check on status of Plan of Care faxed on 4/3/18. Please call [facility] on Monday.

## 2018-04-30 ENCOUNTER — TELEPHONE (OUTPATIENT)
Dept: FAMILY MEDICINE CLINIC | Age: 73
End: 2018-04-30

## 2018-04-30 NOTE — TELEPHONE ENCOUNTER
Luis Miguel Ortega from [facility] called to check the status of the Plan of Tx she has yet to receive signed. Please call and advise Luis Miguel Ortega [phone].

## 2018-05-03 ENCOUNTER — TELEPHONE (OUTPATIENT)
Dept: PULMONOLOGY | Age: 73
End: 2018-05-03

## 2018-05-03 ENCOUNTER — TELEPHONE (OUTPATIENT)
Dept: FAMILY MEDICINE CLINIC | Age: 73
End: 2018-05-03

## 2018-05-03 NOTE — TELEPHONE ENCOUNTER
PT'S WIFE CALLED([phone]). WANTS TO KNOW THE RESULTS OF HOME SLEEP STUDY DONE IN APRIL. PLEASE CHECK AND CALL BACK.

## 2018-05-08 NOTE — TELEPHONE ENCOUNTER
I referred pt to [place] for PT/OT on 3/30/18. Could you check and follow up on this please. Thanks.

## 2018-05-08 NOTE — TELEPHONE ENCOUNTER
Called Silvia Showers from [address] [place] [address] at [phone], left voicemail to return call.

## 2018-05-10 NOTE — TELEPHONE ENCOUNTER
Rachael Vidales from [facility] received the plan of treatment. [facility]

## 2018-05-17 DIAGNOSIS — R06.83 SNORING: ICD-10-CM

## 2018-06-11 DIAGNOSIS — E78.00 PURE HYPERCHOLESTEROLEMIA: ICD-10-CM

## 2018-06-11 RX ORDER — ATORVASTATIN CALCIUM 80 MG/1
80 TABLET, FILM COATED ORAL DAILY
Qty: 30 TAB | Refills: 3 | Status: SHIPPED | OUTPATIENT
Start: 2018-06-11 | End: 2018-10-26 | Stop reason: SDUPTHER

## 2018-10-11 ENCOUNTER — TELEPHONE (OUTPATIENT)
Dept: FAMILY MEDICINE CLINIC | Age: 73
End: 2018-10-11

## 2018-10-11 NOTE — TELEPHONE ENCOUNTER
Bartolo Mancia from [address] [place] [address] called because the medication for the Metformin and Lasartan can only be filled as a 90 day supply due to insurance. Bartolo Mancia also said that the metformin needs to be 180 mg. Please advise.   [phone]

## 2018-10-12 NOTE — TELEPHONE ENCOUNTER
Returned call to [facility]; explained that per chart review, Metformin and losartan was prescribed on 4/3/2018 as 90 day supplies with 2 refills, and per the patient's medication history, the metformin has been prescribed at 500mg. This script was also sent to [address] [place] [address] and a recent script for refill was not sent to [facility]. Per pharmacy tech, they have a written script from the patient that was prescribed in March for 30 day supply with 5 refills; per chart review that was discontinued and the 90 day supply was sent to [facility]. Pharmacy states they will contact patient to verify his scripts and where he want's them filled. Will also contact patient to clarify his status of scripts with [employer].

## 2018-10-18 DIAGNOSIS — I10 ESSENTIAL HYPERTENSION: ICD-10-CM

## 2018-10-18 DIAGNOSIS — E11.65 TYPE 2 DIABETES MELLITUS WITH HYPERGLYCEMIA, WITHOUT LONG-TERM CURRENT USE OF INSULIN (HCC): ICD-10-CM

## 2018-10-18 RX ORDER — LOSARTAN POTASSIUM 100 MG/1
100 TABLET ORAL DAILY
Qty: 90 TAB | Refills: 0 | Status: SHIPPED | OUTPATIENT
Start: 2018-10-18

## 2018-10-18 RX ORDER — METFORMIN HYDROCHLORIDE 500 MG/1
500 TABLET ORAL 2 TIMES DAILY WITH MEALS
Qty: 180 TAB | Refills: 0 | Status: SHIPPED | OUTPATIENT
Start: 2018-10-18

## 2018-10-26 DIAGNOSIS — E78.00 PURE HYPERCHOLESTEROLEMIA: ICD-10-CM

## 2018-10-26 RX ORDER — ATORVASTATIN CALCIUM 80 MG/1
80 TABLET, FILM COATED ORAL DAILY
Qty: 30 TAB | Refills: 0 | Status: SHIPPED | OUTPATIENT
Start: 2018-10-26

## 2018-10-26 NOTE — TELEPHONE ENCOUNTER
Lipitor refilled and sent to pharmacy. Please call and notify pt that no further refills will be granted unless he makes a f/u appt.

## 2019-03-14 ENCOUNTER — TELEPHONE (OUTPATIENT)
Dept: FAMILY MEDICINE CLINIC | Age: 74
End: 2019-03-14

## 2019-03-14 NOTE — TELEPHONE ENCOUNTER
Spoke to patient and scheduled Medicare Wellness visit with Magan Sanders for 4/8/19 at 7:00am. Patient advised that fasting labs may be requested.

## 2019-03-25 DIAGNOSIS — I10 ESSENTIAL HYPERTENSION: ICD-10-CM

## 2019-03-25 RX ORDER — HYDROCHLOROTHIAZIDE 25 MG/1
25 TABLET ORAL DAILY
Qty: 90 TAB | Refills: 0 | Status: SHIPPED | OUTPATIENT
Start: 2019-03-25

## 2019-04-24 PROBLEM — R27.0 ATAXIA: Status: ACTIVE | Noted: 2018-02-27

## 2019-08-19 ENCOUNTER — OFFICE VISIT (OUTPATIENT)
Dept: PULMONOLOGY | Age: 74
End: 2019-08-19

## 2019-08-19 VITALS
HEART RATE: 78 BPM | RESPIRATION RATE: 20 BRPM | SYSTOLIC BLOOD PRESSURE: 138 MMHG | WEIGHT: 186.5 LBS | HEIGHT: 64 IN | DIASTOLIC BLOOD PRESSURE: 84 MMHG | TEMPERATURE: 97.7 F | BODY MASS INDEX: 31.84 KG/M2 | OXYGEN SATURATION: 97 %

## 2019-08-19 DIAGNOSIS — F12.90 MARIJUANA USE, EPISODIC: ICD-10-CM

## 2019-08-19 DIAGNOSIS — F43.12 CHRONIC POST-TRAUMATIC STRESS DISORDER (PTSD): ICD-10-CM

## 2019-08-19 DIAGNOSIS — G47.33 OSA (OBSTRUCTIVE SLEEP APNEA): Primary | ICD-10-CM

## 2019-08-19 DIAGNOSIS — Z78.9 DIFFICULTY WITH CPAP USE: ICD-10-CM

## 2019-08-19 NOTE — PROGRESS NOTES
[facility]  Pulmonary, Critical Care, and Sleep Medicine     Office Progress Note- Follow UP      Primary Care Physician: No primary care provider on file. Reason for Visit:  Follow Up BRENDAN      Assessment:  1. Obstructive Sleep Apnea (BRENDAN)- Moderate ( AHI: 26.9)- Currently not using his APAP device. He denies intolerance and that he just has to \"do it\". I spoke with wife today who reports that she will also encourage him to use. Both agreed that a PAP nap may be helpful to improve compliance and comfort with using equipment. Unclear if patient's anxiety is contributing to PAP difficulty  2. Excessive Daytime Sleepiness (EDS)  3. PTSD- followed by [facility]/ anxiety  4. Marijuana use- reports very rare use  5. H/o prior cocaine use  6. Obesity: Body mass index is 32.01 kg/m². Plan:  ·   · Order PAP nap  · Patient encouraged to start using his current device  · Healthy lifestyle changes to include weight loss and exercise discussed. · Healthy sleep habits were reviewed and encouraged. ·  and workplace safety reviewed and discussed as appropriate. Drowsy and/or inattentive driving should be avoided. · Follow-up in 3 months, sooner should new symptoms or problems arise. - Wife reports that she will come to next visit  · Follow up with [address] as directed. History of Present Illness: Mr. Shorty Gonzalez is a 69 y.o. male patient who reports disruptive snoring and a prior diagnosis of BRENDAN. The history was provided by the patient. Patient was diagnosed with BRENDAN several years ago at [employer]. He was prescribed CPAP at that time but could never get use to the device and stopped using. The patient represented to our clinic to reassess for BRENDAN upon the urging of his wife. The patient subsequently underwent an HST on 4/3/18 which was notable for moderate BRENDAN  along with an elevated DANA of 31.2 and SpO2 joe of 79%.  The patient was started on APAP therapy but did not return for follow up until now. He states the following:  · He was late to today's visit due to a prolonged visit at the [place] this morning. · He did not bring his device. · We were not able to obtain a compliance report from the cloud- DME was called and left message  · He has not been using his APAP. He denies intolerance but he just has not been using  · His wife has been encouraging him to use  · He states he was contacted by his DME about his non-compliance and that he is willing to pay out of pocket to keep device until he becomes compliant  · Medication reconciliation - patient could not tell me what medications he takes. He called his wife who help me reconcile our list. She also told me that she is encouraging the patient to use his device but that she does not feel he remembers how to use the device anymore. He does have supplies. 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 - The patient refers to it as \"Heavy\"       Motor vehicle accident(s) associated with drowsy driving:is denied by the patient     Snoring: This is a Chronic problem which has been ongoing for years. His wife sleeps in another room which is disturbing to the patient and one of the main reasons for this follow up. Fatigue: This is a Chronic problem that has been on going for years. Fatigue has been increasing which he attributes to getting older. [place] today is 5/24    Dental: Teeth clenching or grindingis not reported. Naps: are reported.  Daily 11:00 -1400 ( 10-15 minutes during the work; 2 hours on weekend)    Leg Symptoms/Pain: He does not have unpleasant or crawling sensation in legs or strong urge to move when inactive. GERD: is not reported. Mood: Has PTSD- does have flashbacks but no nightmares, Does have depression and anxiety but feels it is currently controlled. See's a psychiatrist at [address]. No thoughts of harming self or others. Denies prior suicidal attempts. Sleep-Wake History:     Estimates  Sleeping approximately 3-4 hours per night/day. He gets into bed at approximately . Once he gets into bed and does not watch TV or uses his phone. Room is dark. It usually takes up to 45 minutes to fall asleep after going to bed. The patient awakens several times throughout the night: Wife reports kicking, snoring but the patient is not aware of this. Gets up to use the bathroom once nightly ( He had a prostatectomy one year ago. Has urinary incontinence and wears a diaper which he changes once nightly). Pain does not disrupt his sleep. Does report vivid dreams which he finds pleasant. Normally gets up out of bed at 0600 during the week and  on the weekend. Awakens spontaneously. Denies awakening with a headache or dry mouth. He denies symptoms suggestive of cataplexy, sleep paralysis, hypnagogic and/or hypnopompic hallucinations. No reports of  sleep talking/ walking, or other unusual sleep behaviors. Family Sleep History:  - none known      No flowsheet data found.     3 most recent PHQ Screens 8/19/2019   Little interest or pleasure in doing things Not at all   Feeling down, depressed, irritable, or hopeless Not at all   Total Score PHQ 2 0       [place] Scale 8/19/2019 2/9/2018   Sitting and Reading 2 0   Watching TV 2 1   Sitting, inactive in a public place (e.g. a movie theater or meeting) 2 1   As a passenger in a car for an hour, without a break 2 1   Lying down to rest in the afternoon, when circumstances permit 3 1   Sitting and talking to someone 0 0   Sitting quietly after lunch without alcohol 2 1   In a car, while stopped for a few minutes in traffic 0 0   [place] Sleepiness Score 13 5                  Past Medical History:  Past Medical History:   Diagnosis Date    CAD (coronary artery disease) 6-21-02 8-5-06 [address]: +inferolat ischemic    Diabetes ([facility]     Elevated PSA 11/21/2011    Essential hypertension     Gout     Heartburn 12-4-03    Hemorrhoids     Hypercholesterolemia 7-15-08    TSH 0.88    Hyperlipidemia     Prostate cancer ([facility] 03/25/2016    [place]vin 7 in 1/12 cores and [place] 6 in 2/12 cores. PSA 6.0ng/mL. TRUS Volume 143 grams    Psoriasis     Rotator cuff tear, right     followed by Dr[facility] Hilario Sun ([facility])    Shoulder pain, right 1/27/2012    FIDEL (stress urinary incontinence), male     Vitamin D deficiency 11/25/2011       Past Surgical History:  Past Surgical History:   Procedure Laterality Date    HX KNEE ARTHROSCOPY      HX PROSTATECTOMY  11/16/2016    Robotic-assisted laparoscopic radical prostatectomy. Robotic-assisted laparoscopic bilateral pelvic lymph node dissection  .Dissection was very difficult, bladder neck repair/anastamosis complex and time for this was >50% of time ordinarily required for this procedure.  AL COLONOSCOPY FLX DX W/COLLJ SPEC WHEN PFRMD  4-30-01    papules/ [place]    AL COLONOSCOPY FLX DX W/COLLJ SPEC WHEN PFRMD      normal . Dr. Saeid England       Family History:  Family History   Problem Relation Age of Onset    Other Brother         vocal cord polyps    Cancer Brother         throat    Heart Disease Brother     Heart Attack Brother     Cancer Mother         leukemia       Social History:  Social History     Tobacco Use    Smoking status: Never Smoker    Smokeless tobacco: Never Used   Substance Use Topics    Alcohol use:  Yes     Alcohol/week: 0.8 - 1.7 standard drinks     Types: 1 - 2 Cans of beer per week     Comment: Socially    Drug use: No        Caffeine Amount Time of last Intake Comments   Coffee None     Soda 1 can QOD  Regular, caffinated   Tea 1 glass QOD     Energy Drinks None     Over- the - counter stimulant pills None     Other Substances      Alcohol 1 drink Q 3 days  Wine, wiskey   Tobacco Never     Drugs Marijuana- smoke  1 week ago; 2/year     H/O Cocaine 4 years ago        Medications:  Current Outpatient Medications on File Prior to Visit   Medication Sig Dispense Refill    hydroCHLOROthiazide (HYDRODIURIL) 25 mg tablet Take 1 Tab by mouth daily. 90 Tab 0    atorvastatin (LIPITOR) 80 mg tablet Take 1 Tab by mouth daily. 30 Tab 0    losartan (COZAAR) 100 mg tablet Take 1 Tab by mouth daily. 90 Tab 0    metFORMIN (GLUCOPHAGE) 500 mg tablet Take 1 Tab by mouth two (2) times daily (with meals). 180 Tab 0    ergocalciferol (ERGOCALCIFEROL) 50,000 unit capsule Take 1 Cap by mouth every seven (7) days. 12 Cap 2    sertraline (ZOLOFT) 50 mg tablet Take 1 Tab by mouth daily. 30 Tab 3    aspirin (ASPIRIN) 325 mg tablet Take 1 Tab by mouth daily. 90 Tab 1    clopidogrel (PLAVIX) 75 mg tab Take 1 Tab by mouth daily. 90 Tab 0    OTHER,NON-FORMULARY, adalafil 6 mg tablets - Patient to take 1 tab PO daily. Max 1 tab daily- fax to [phone] 90 Each 3    terazosin (HYTRIN) 2 mg capsule 2 mg. No current facility-administered medications on file prior to visit.          Allergy:  No Known Allergies    Review of Systems  General ROS: positive for  - fatigue and sleep disturbance, + snoring  negative for - chills, fever, malaise, weight gain or weight loss  ENT ROS: negative  Hematological and Lymphatic ROS: negative for - bleeding problems, blood clots, bruising, jaundice, pallor or swollen lymph nodes  Endocrine ROS: negative for - polydipsia/polyuria, skin changes, temperature intolerance or unexpected weight changes  Respiratory ROS: no cough, shortness of breath, or wheezing  Cardiovascular ROS: no chest pain or dyspnea on exertion  Gastrointestinal ROS: no abdominal pain, change in bowel habits, or black or bloody stools  Genito-Urinary ROS: no dysuria, trouble voiding, or hematuria  Musculoskeletal ROS: negative  Neurological ROS: no TIA or stroke symptoms  Dermatological ROS: negative for - pruritus, rash or skin lesion changes   Otherwise negative. Physical Exam:  Blood pressure 138/84, pulse 78, temperature 97.7 °F (36.5 °C), temperature source Oral, resp. rate 20, height 5' 4\" (1.626 m), weight 84.6 kg (186 lb 8 oz), SpO2 97 %. on room air, Body mass index is 32.01 kg/m².        General: No distress, acyanotic, appears stated age, cooperative, pleasant  HEENT: PERRL, EOMI, throat without erythema or exudate, + Macroglossia- dental indention on tongue, Mallampati's score 4+, Uvula- midline- Tonsils- present; not fully visualized- strong gag response  Neck: Supple,  no abnormally enlarged lymph nodes, thyroid is not enlarged, non-tender, No JVD  Chest: normal  Lungs: moderate air entry, clear to auscultation bilaterally,   Heart: Regular rate and rhythm, S1S2 present, without murmur  Abdomen: Obese, abdomen is soft without significant tenderness, or guarding  Extremity: negative for edema, cyanosis or clubbing  Skin: Skin color, texture, turgor normal. No rashes or lesions    Data Reviewed:  CBC:   Lab Results   Component Value Date/Time    WBC 3.1 (L) 11/21/2017 11:55 AM    HGB 15.5 11/21/2017 11:55 AM    HCT 47.7 11/21/2017 11:55 AM    PLATELET 482 61/67/2588 11:55 AM    MCV 90.3 11/21/2017 11:55 AM       BMP:   Lab Results   Component Value Date/Time    Sodium 140 11/21/2017 11:55 AM    Potassium 3.5 11/21/2017 11:55 AM    Chloride 99 (L) 11/21/2017 11:55 AM    CO2 34 (H) 11/21/2017 11:55 AM    Anion gap 7 11/21/2017 11:55 AM    Glucose 177 (H) 11/21/2017 11:55 AM    BUN 28 (H) 11/21/2017 11:55 AM    Creatinine 2.12 (H) 11/21/2017 11:55 AM    BUN/Creatinine ratio 13 11/21/2017 11:55 AM    GFR est AA 38 (L) 11/21/2017 11:55 AM    GFR est non-AA 31 (L) 11/21/2017 11:55 AM    Calcium 9.3 11/21/2017 11:55 AM TSH:  Lab Results   Component Value Date/Time    TSH 1.33 11/14/2017 02:31 PM    TSH 1.52 06/12/2017 08:10 AM    TSH 1.03 11/02/2016 11:20 AM    TSH 1.070 04/14/2014 12:51 PM    TSH 1.270 11/13/2013 11:38 AM    TSH 1.330 07/10/2012 11:30 AM    TSH 1.330 12/01/2010 09:15 AM       Imaging:  [x]I have personally reviewed the patients radiographs section   Results from [place] encounter on 11/21/17   XR CHEST PA LAT    Narrative AP and lateral chest    History: Syncope and weakness today    Comparison: None    Findings: The cardiomediastinal silhouette is within normal limits. The pulmonary  vasculature is normal.  There is no focal consolidation, pneumothorax or pleural  effusion. The acromioclavicular joints are moderately hypertrophied. Impression Impression:     No radiographic evidence of acute cardiopulmonary disease. Thank you for this referral.       Results from [facility] encounter on 11/14/16   CT ABD PELV W WO CONT    Impression CT STUDY OF THE ABDOMEN AND PELVIS    HISTORY:  16-year-old man     SYMPTOMS:  Prostate cancer, elevated PSA     REFERRING DIAGNOSIS:  Malignant neoplasm of prostate     TECHNIQUE: Ultra-low-dose helical    IN HOUSE CREATININE: 11-11-16    REASON: Age, Diabetic, and HTN    CREATININE: 1.3 eGFR: 63.44    CONTRAST DECISION: 100cc of Isovue      COMPARISON:  CT February 5, 2016     FINDINGS:      Kidneys/ureters: The kidneys appear normal in size, morphology, position, and contrast enhancement pattern. No calcified stones are identified in the kidneys, ureters or bladder. No solid masses are identified. No hydronephrosis or perirenal fluid collections. The ureters and bladder are unremarkable. Liver: The liver is normal in size, morphology and contrast enhancement pattern. Severe steatosis. No focal lesion identified. Biliary tree: Within normal limits. Gallbladder:  Within normal limits. Spleen:  Within normal limits. Pancreas:  Within normal limits. Adrenals:  Within normal limits. Lung bases:  Within normal limits. Bowel:  Small sliding hiatal hernia. No bowel obstruction or inflammation is seen. The appendix appears normal.           Aorta/vasculature: Within normal limits. Pelvis:  Prostatic enlargement. Multiple small stones are seen in the dependent portion of the bladder lumen. Miscellaneous:  No pathologically enlarged lymph nodes or ascites is seen. IMPRESSION:    No CT evidence for abdominopelvic metastases. Bladder calculi. Severe hepatic steatosis. Large prostate. Small sliding hiatal hernia. Thank you for this referral.                                 Interpreting Radiologist:  Saad Wright M.D.                                  Electronically signed:  Saad Wright   11/11/2016          Name:  Flash Hankins                                                  Historical Sleep Testing Data:  - HST: 4/3/18:  AHI: 26.9, DANA: 31.2  SpO2 joe: 72%        Zamzam Avalos DO, [facility]  Pulmonary, Sleep and Critical Care Medicine

## 2019-08-19 NOTE — PROGRESS NOTES
Renny Simmons presents today for   Chief Complaint   Patient presents with    Sleep Apnea     Study done 5/17    Snoring       Is someone accompanying this pt? No     Is the patient using any DME equipment during OV? cpap    -[facility] Company first choice    Depression Screening:  3 most recent PHQ Screens 8/19/2019   Little interest or pleasure in doing things Not at all   Feeling down, depressed, irritable, or hopeless Not at all   Total Score PHQ 2 0       Learning Assessment:  Learning Assessment 4/14/2014   PRIMARY LEARNER Patient   HIGHEST LEVEL OF EDUCATION - PRIMARY LEARNER  4 YEARS OF COLLEGE   BARRIERS PRIMARY LEARNER NONE   CO-LEARNER CAREGIVER No   PRIMARY LANGUAGE ENGLISH   LEARNER PREFERENCE PRIMARY VIDEOS   ANSWERED BY patient   RELATIONSHIP SELF       Abuse Screening:  Abuse Screening Questionnaire 8/19/2019   Do you ever feel afraid of your partner? N   Are you in a relationship with someone who physically or mentally threatens you? N   Is it safe for you to go home? Y       Fall Risk  Fall Risk Assessment, last 12 mths 8/19/2019   Able to walk? Yes   Fall in past 12 months? No         Coordination of Care:  1. Have you been to the ER, urgent care clinic since your last visit? Hospitalized since your last visit? No    2. Have you seen or consulted any other health care providers outside of the [address] [place] [address] since your last visit? Include any pap smears or colon screening.  Dr Jayy Jack    Medication variance in dosage/sig per patient as follows: no change

## 2019-09-13 DIAGNOSIS — Z78.9 DIFFICULTY WITH CPAP USE: ICD-10-CM

## 2019-09-13 DIAGNOSIS — F43.12 CHRONIC POST-TRAUMATIC STRESS DISORDER (PTSD): ICD-10-CM

## 2019-09-13 DIAGNOSIS — G47.33 OSA (OBSTRUCTIVE SLEEP APNEA): ICD-10-CM

## 2019-09-13 DIAGNOSIS — F12.90 MARIJUANA USE, EPISODIC: ICD-10-CM

## 2019-09-25 ENCOUNTER — OFFICE VISIT (OUTPATIENT)
Dept: PULMONOLOGY | Age: 74
End: 2019-09-25

## 2019-09-25 VITALS
BODY MASS INDEX: 32.61 KG/M2 | HEIGHT: 64 IN | RESPIRATION RATE: 16 BRPM | WEIGHT: 191 LBS | SYSTOLIC BLOOD PRESSURE: 146 MMHG | OXYGEN SATURATION: 96 % | HEART RATE: 79 BPM | TEMPERATURE: 97.8 F | DIASTOLIC BLOOD PRESSURE: 86 MMHG

## 2019-09-25 DIAGNOSIS — M25.472 EDEMA OF BOTH ANKLES: ICD-10-CM

## 2019-09-25 DIAGNOSIS — Z78.9 DIFFICULTY WITH CPAP USE: ICD-10-CM

## 2019-09-25 DIAGNOSIS — E66.9 OBESITY (BMI 30.0-34.9): ICD-10-CM

## 2019-09-25 DIAGNOSIS — F43.12 CHRONIC POST-TRAUMATIC STRESS DISORDER (PTSD): ICD-10-CM

## 2019-09-25 DIAGNOSIS — G47.33 OSA (OBSTRUCTIVE SLEEP APNEA): Primary | ICD-10-CM

## 2019-09-25 DIAGNOSIS — M25.471 EDEMA OF BOTH ANKLES: ICD-10-CM

## 2019-09-25 NOTE — PROGRESS NOTES
Silva Cagle Pulmonary Specialist  Pulmonary, Critical Care, and Sleep Medicine     Office Progress Note- Follow UP      Primary Care Physician: No primary care provider on file. Reason for Visit:  Follow Up BRENDAN      Assessment:  1. Obstructive Sleep Apnea (BRENDAN)- Moderate ( AHI: 26.9)- has restarted using device. Doing better with nasal mask. Needs to continue to work on use time  2. Excessive Daytime Sleepiness (EDS)  3. PTSD- followed by [facility][note 3] Administration/ anxiety  4. Edema- both ankles, mild- monitor and follow up with PCP at [place]  5. Marijuana use- reports very rare use  6. H/o prior cocaine use  7. Obesity: Body mass index is 32.79 kg/m². Plan:  · Patient has agreed to try chin strap again to see if there is a subjective benefit with his current nasal mask  · Patient given encouragement to continue to work on using device  · PAP device hygiene and care reviewed  · Renew PAP supplies  · Healthy lifestyle changes to include weight loss and exercise discussed. · Healthy sleep habits were reviewed and encouraged. ·  and workplace safety reviewed and discussed as appropriate. Drowsy and/or inattentive driving should be avoided. · Follow-up in 3 months, sooner should new symptoms or problems arise. - Wife reports that she will come to next visit  · Follow up with [facility]  as directed. History of Present Illness: Mr. Tanner Espinoza is a 69 y.o. male patient who reports disruptive snoring and a prior diagnosis of BRENDAN. The history was provided by the patient. Patient was diagnosed with BRENDAN several years ago at [facility]. He was prescribed CPAP at that time but could never get use to the device and stopped using. The patient represented to our clinic to reassess for BRENDAN upon the urging of his wife. The patient subsequently underwent an HST on 4/3/18 which was notable for moderate BRENDAN  along with an elevated DANA of 31.2 and SpO2 joe of 79%.  The patient was started on APAP therapy but stopped using due to intolerance issues. Since his last visit, he underwent a PAP Nap evaluation at our sleep lab. At that session he was refitted with a nasal mask but was noted to have some mouth breathing. A chin strap was applied with some mild leaks but this setup appeared to be the best tolerated thus far. Today the patient reports the following    · He likes the nasal mask better and restarted using the device in September 2019. Tolerance and use time has been increasing  · He has not bee using the chin strap abut he reports tolerating leaks  · He has had some minor skin irritation around the nares but tolerable and no breakdown  · No aerophagia or bloating. · He has not been cleaning his device- Proper cleaning and device are reviewed today in clinic  · Continue to receive care through the [employer].       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 a movie theater or meeting) 2 2 1   As a passenger in a car for an hour, without a break 2 2 1   Lying down to rest in the afternoon, when circumstances permit 3 3 1   Sitting and talking to someone 0 0 0   Sitting quietly after lunch without alcohol 1 2 1   In a car, while stopped for a few minutes in traffic 0 0 0   [place] Sleepiness Score 10 13 5                  Past Medical History:  Past Medical History:   Diagnosis Date    CAD (coronary artery disease) 6-21-02 8-5-06 [address]: +inferolat ischemic    Diabetes ([facility] [address]  Elevated PSA 11/21/2011    Essential hypertension     Gout     Heartburn 12-4-03    Hemorrhoids     Hypercholesterolemia 7-15-08    TSH 0.88    Hyperlipidemia     Prostate cancer ([facility] [address] 03/25/2016    [place] 7 in 1/12 cores and [place] 6 in 2/12 cores. PSA 6.0ng/mL. TRUS Volume 143 grams    Psoriasis     Rotator cuff tear, right     followed by Dr. Fartun Webber ([facility])    Shoulder pain, right 1/27/2012    FIDEL (stress urinary incontinence), male     Vitamin D deficiency 11/25/2011       Past Surgical History:  Past Surgical History:   Procedure Laterality Date    HX KNEE ARTHROSCOPY      HX PROSTATECTOMY  11/16/2016    Robotic-assisted laparoscopic radical prostatectomy. Robotic-assisted laparoscopic bilateral pelvic lymph node dissection  .Dissection was very difficult, bladder neck repair/anastamosis complex and time for this was >50% of time ordinarily required for this procedure.  VA COLONOSCOPY FLX DX W/COLLJ SPEC WHEN PFRMD  4-30-01    papules/ [place]    VA COLONOSCOPY FLX DX W/COLLJ SPEC WHEN PFRMD      normal . Dr. Lucas Owens       Family History:  Family History   Problem Relation Age of Onset    Other Brother         vocal cord polyps    Cancer Brother         throat    Heart Disease Brother     Heart Attack Brother     Cancer Mother         leukemia       Social History:  Social History     Tobacco Use    Smoking status: Never Smoker    Smokeless tobacco: Never Used   Substance Use Topics    Alcohol use:  Yes     Alcohol/week: 0.8 - 1.7 standard drinks     Types: 1 - 2 Cans of beer per week     Comment: Socially    Drug use: No        Caffeine Amount Time of last Intake Comments   Coffee None     Soda 1 can QOD  Regular, caffinated   Tea 1 glass QOD     Energy Drinks None     Over- the - counter stimulant pills None     Other Substances      Alcohol 1 drink Q 3 days  Wine, wiskey   Tobacco Never     Drugs Marijuana- smoke  1 week ago; 2/year     H/O Cocaine 4 years ago        Medications:  Current Outpatient Medications on File Prior to Visit   Medication Sig Dispense Refill    hydroCHLOROthiazide (HYDRODIURIL) 25 mg tablet Take 1 Tab by mouth daily. 90 Tab 0    atorvastatin (LIPITOR) 80 mg tablet Take 1 Tab by mouth daily. 30 Tab 0    losartan (COZAAR) 100 mg tablet Take 1 Tab by mouth daily. 90 Tab 0    metFORMIN (GLUCOPHAGE) 500 mg tablet Take 1 Tab by mouth two (2) times daily (with meals). 180 Tab 0    ergocalciferol (ERGOCALCIFEROL) 50,000 unit capsule Take 1 Cap by mouth every seven (7) days. 12 Cap 2    sertraline (ZOLOFT) 50 mg tablet Take 1 Tab by mouth daily. 30 Tab 3    clopidogrel (PLAVIX) 75 mg tab Take 1 Tab by mouth daily. 90 Tab 0    OTHER,NON-FORMULARY, adalafil 6 mg tablets - Patient to take 1 tab PO daily. Max 1 tab daily- fax to [phone] 90 Each 3     No current facility-administered medications on file prior to visit.          Allergy:  No Known Allergies    Review of Systems  General ROS: positive for  - fatigue and sleep disturbance- some improvement  negative for - chills, fever, malaise, weight gain or weight loss  ENT ROS: negative  Hematological and Lymphatic ROS: negative for - bleeding problems, blood clots, bruising, jaundice, pallor or swollen lymph nodes  Endocrine ROS: negative for - polydipsia/polyuria, skin changes, temperature intolerance or unexpected weight changes  Respiratory ROS: no cough, shortness of breath, or wheezing  Cardiovascular ROS: no chest pain or dyspnea on exertion  Gastrointestinal ROS: no abdominal pain, change in bowel habits, or black or bloody stools  Genito-Urinary ROS: no dysuria, trouble voiding, or hematuria  Musculoskeletal ROS: negative  Neurological ROS: no TIA or stroke symptoms  Dermatological ROS: negative for - pruritus, rash or skin lesion changes   Otherwise negative and per HPI      Physical Exam:  Blood pressure 146/86, pulse 79, temperature 97.8 °F (36.6 °C), temperature source Oral, resp. rate 16, height 5' 4\" (1.626 m), weight 86.6 kg (191 lb), SpO2 96 %. on room air, Body mass index is 32.79 kg/m². General: No distress, acyanotic, appears stated age, cooperative, pleasant  HEENT: PERRL, EOMI, throat without erythema or exudate, + Macroglossia- dental indention on tongue, Mallampati's score 4+, Uvula- midline- Tonsils- present; not fully visualized.   Neck: Supple,  no abnormally enlarged lymph nodes, thyroid is not enlarged, non-tender, No JVD  Chest: normal  Lungs: moderate air entry, clear to auscultation bilaterally,   Heart: Regular rate and rhythm, S1S2 present, without murmur  Abdomen: Obese, abdomen is soft without significant tenderness, or guarding  Extremity: negative for cyanosis or clubbing, 1+ bilateral ankle edema present  Skin: Skin color, texture, turgor normal. No rashes or lesions    Data Reviewed:  CBC:   Lab Results   Component Value Date/Time    WBC 3.1 (L) 11/21/2017 11:55 AM    HGB 15.5 11/21/2017 11:55 AM    HCT 47.7 11/21/2017 11:55 AM    PLATELET 176 24/20/6195 11:55 AM    MCV 90.3 11/21/2017 11:55 AM       BMP:   Lab Results   Component Value Date/Time    Sodium 140 11/21/2017 11:55 AM    Potassium 3.5 11/21/2017 11:55 AM    Chloride 99 (L) 11/21/2017 11:55 AM    CO2 34 (H) 11/21/2017 11:55 AM    Anion gap 7 11/21/2017 11:55 AM    Glucose 177 (H) 11/21/2017 11:55 AM    BUN 28 (H) 11/21/2017 11:55 AM    Creatinine 2.12 (H) 11/21/2017 11:55 AM    BUN/Creatinine ratio 13 11/21/2017 11:55 AM    GFR est AA 38 (L) 11/21/2017 11:55 AM    GFR est non-AA 31 (L) 11/21/2017 11:55 AM    Calcium 9.3 11/21/2017 11:55 AM        TSH:  Lab Results   Component Value Date/Time    TSH 1.33 11/14/2017 02:31 PM    TSH 1.52 06/12/2017 08:10 AM    TSH 1.03 11/02/2016 11:20 AM    TSH 1.070 04/14/2014 12:51 PM    TSH 1.270 11/13/2013 11:38 AM    TSH 1.330 07/10/2012 11:30 AM    TSH 1.330 12/01/2010 09:15 AM       Imaging:  [x]I have personally reviewed the patients radiographs section Results from [facility] encounter on 11/21/17   XR CHEST PA LAT    Narrative AP and lateral chest    History: Syncope and weakness today    Comparison: None    Findings: The cardiomediastinal silhouette is within normal limits. The pulmonary  vasculature is normal.  There is no focal consolidation, pneumothorax or pleural  effusion. The acromioclavicular joints are moderately hypertrophied. Impression Impression:     No radiographic evidence of acute cardiopulmonary disease. Thank you for this referral.       Results from Orders Only encounter on 11/14/16   CT ABD PELV W WO CONT    Impression CT STUDY OF THE ABDOMEN AND PELVIS    HISTORY:  79-year-old man     SYMPTOMS:  Prostate cancer, elevated PSA     REFERRING DIAGNOSIS:  Malignant neoplasm of prostate     TECHNIQUE: Ultra-low-dose helical    IN HOUSE CREATININE: 11-11-16    REASON: Age, Diabetic, and HTN    CREATININE: 1.3 eGFR: 63.44    CONTRAST DECISION: 100cc of Isovue      COMPARISON:  CT February 5, 2016     FINDINGS:      Kidneys/ureters: The kidneys appear normal in size, morphology, position, and contrast enhancement pattern. No calcified stones are identified in the kidneys, ureters or bladder. No solid masses are identified. No hydronephrosis or perirenal fluid collections. The ureters and bladder are unremarkable. Liver: The liver is normal in size, morphology and contrast enhancement pattern. Severe steatosis. No focal lesion identified. Biliary tree: Within normal limits. Gallbladder:  Within normal limits. Spleen:  Within normal limits. Pancreas:  Within normal limits. Adrenals:  Within normal limits. Lung bases:  Within normal limits. Bowel:  Small sliding hiatal hernia. No bowel obstruction or inflammation is seen. The appendix appears normal.           Aorta/vasculature: Within normal limits. Pelvis:  Prostatic enlargement.   Multiple small stones are seen in the dependent portion of the bladder lumen. Miscellaneous:  No pathologically enlarged lymph nodes or ascites is seen. IMPRESSION:    No CT evidence for abdominopelvic metastases. Bladder calculi. Severe hepatic steatosis. Large prostate. Small sliding hiatal hernia. Thank you for this referral.                                 Interpreting Radiologist:  Abimbola Hickman M.D.                                  Electronically signed:  Abimbola Hickman   11/11/2016          Name:  Bobo Bridges                                                  Historical Sleep Testing Data:  - HST: 4/3/18:  AHI: 26.9, DANA: 31.2  SpO2 joe: 72%        Erasto Otoole DO, [facility]  Pulmonary, Sleep and Critical Care Medicine

## 2021-06-09 ENCOUNTER — OFFICE VISIT (OUTPATIENT)
Dept: PULMONOLOGY | Age: 76
End: 2021-06-09
Payer: MEDICARE

## 2021-06-09 VITALS
DIASTOLIC BLOOD PRESSURE: 80 MMHG | TEMPERATURE: 97.9 F | OXYGEN SATURATION: 94 % | HEIGHT: 64 IN | HEART RATE: 79 BPM | SYSTOLIC BLOOD PRESSURE: 134 MMHG | RESPIRATION RATE: 14 BRPM | BODY MASS INDEX: 30.73 KG/M2 | WEIGHT: 180 LBS

## 2021-06-09 DIAGNOSIS — Z78.9 DIFFICULTY WITH CPAP USE: ICD-10-CM

## 2021-06-09 DIAGNOSIS — E66.9 OBESITY (BMI 30.0-34.9): ICD-10-CM

## 2021-06-09 DIAGNOSIS — G47.33 OSA (OBSTRUCTIVE SLEEP APNEA): Primary | ICD-10-CM

## 2021-06-09 DIAGNOSIS — F43.12 CHRONIC POST-TRAUMATIC STRESS DISORDER (PTSD): ICD-10-CM

## 2021-06-09 PROCEDURE — G8536 NO DOC ELDER MAL SCRN: HCPCS | Performed by: INTERNAL MEDICINE

## 2021-06-09 PROCEDURE — G8752 SYS BP LESS 140: HCPCS | Performed by: INTERNAL MEDICINE

## 2021-06-09 PROCEDURE — G8427 DOCREV CUR MEDS BY ELIG CLIN: HCPCS | Performed by: INTERNAL MEDICINE

## 2021-06-09 PROCEDURE — G8754 DIAS BP LESS 90: HCPCS | Performed by: INTERNAL MEDICINE

## 2021-06-09 PROCEDURE — G8510 SCR DEP NEG, NO PLAN REQD: HCPCS | Performed by: INTERNAL MEDICINE

## 2021-06-09 PROCEDURE — 1101F PT FALLS ASSESS-DOCD LE1/YR: CPT | Performed by: INTERNAL MEDICINE

## 2021-06-09 PROCEDURE — G8417 CALC BMI ABV UP PARAM F/U: HCPCS | Performed by: INTERNAL MEDICINE

## 2021-06-09 PROCEDURE — 99214 OFFICE O/P EST MOD 30 MIN: CPT | Performed by: INTERNAL MEDICINE

## 2021-06-09 PROCEDURE — 3017F COLORECTAL CA SCREEN DOC REV: CPT | Performed by: INTERNAL MEDICINE

## 2021-06-09 RX ORDER — MELATONIN
COMMUNITY
Start: 2021-04-02

## 2021-06-09 NOTE — PATIENT INSTRUCTIONS
Please call our clinic back at [phone] if you have not received a follow up appointment within 30 days prior the recommended follow up time.

## 2021-06-09 NOTE — PROGRESS NOTES
Carlos Herrera presents today for   Chief Complaint   Patient presents with    Follow-up    Sleep Apnea    CPAP       Is someone accompanying this pt? NO    Is the patient using any DME equipment during OV? yes - cpap   -[employer]    Depression Screening:  3 most recent PHQ Screens 6/9/2021   Little interest or pleasure in doing things Several days   Feeling down, depressed, irritable, or hopeless Several days   Total Score PHQ 2 2       [place] Sleepiness Scale:  [place] Sleepiness Scale  6/9/2021   Sitting and Reading 1   Watching TV 2   Sitting, inactive in a public place (e.g. a movie theater or meeting) 2   As a passenger in a car for an hour, without a break 2   Lying down to rest in the afternoon, when circumstances permit 2   Sitting and talking to someone 0   Sitting quietly after lunch without alcohol 1   In a car, while stopped for a few minutes in traffic 0   [place] Sleepiness Score 10         Coordination of Care:  1. Have you been to the ER, urgent care clinic since your last visit? Hospitalized since your last visit? No    2. Have you seen or consulted any other health care providers outside of the [address] [place] [address] since your last visit? Include any pap smears or colon screening. n/a    Medication list has been updated according to patient.

## 2021-06-09 NOTE — PROGRESS NOTES
[facility] Pulmonary Specialist  Pulmonary, Critical Care, and Sleep Medicine     Office Progress Note- Follow UP      Primary Care Physician: Unknown, Provider     Reason for Visit:  Follow Up BRENDAN      Assessment:  1. Obstructive Sleep Apnea (BRENDAN)- Moderate ( AHI: 26.9)- currently note using device  2. Excessive Daytime Sleepiness (EDS)  3. PTSD- followed by [facility]/ anxiety  4. Edema- both ankles, mild- monitor and follow up with PCP at [place]  5. Marijuana use- reports very rare use  6. H/o prior cocaine use  7. Obesity: Body mass index is 30.9 kg/m². Plan:  · Patient has agreed to try chin strap again to see if there is a subjective benefit with his current nasal mask  · Patient given encouragement to continue to work on using device  · PAP device hygiene and care reviewed  · Renew PAP supplies  · Healthy lifestyle changes to include weight loss and exercise discussed. · Healthy sleep habits were reviewed and encouraged. ·  and workplace safety reviewed and discussed as appropriate. Drowsy and/or inattentive driving should be avoided. · Follow-up in 3 months, sooner should new symptoms or problems arise. - Wife reports that she will come to next visit  · Follow up with [facility]  as directed. History of Present Illness: Mr. Tanya Johnson is a 78 y.o. male patient who reports disruptive snoring and a prior diagnosis of BRENDAN. The history was provided by the patient. Patient was diagnosed with BRENDAN several years ago at [facility]. He was prescribed CPAP at that time but could never get use to the device and stopped using. The patient represented to our clinic to reassess for BRENDAN upon the urging of his wife. The patient subsequently underwent an HST on 4/3/18 which was notable for moderate BRENDAN  along with an elevated DANA of 31.2 and SpO2 joe of 79%. The patient was started on APAP therapy but stopped using due to intolerance issues.  He then underwent a PAP Nap evaluation at our sleep lab. At that session he was refitted with a nasal mask but was noted to have some mouth breathing. A chin strap was applied with some mild leaks but this setup appeared to be the best tolerated thus far. Today the patient reports the following    · The patient was last seen in clinic on 9/25/19  · He has not been using his device but states he wants to try again and that he feels he is more determined to deal with PAP therapy  · He is will to undergo repeat sleep testing if needed  · He states he has been have some health issues and he is trying to focus on improving his overall health  · He states he went to see his cardiologist yesterday who has strongly encouraged the patient to restart PAP therapy  · He has gained weight and he want to focus on weight loss  · He has developed cataracts but he states that he needs to improve his A1C before undergoing corrective surgery  · Continues to receive care through the [employer].       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 a movie theater or meeting) 2 2 1   As a passenger in a car for an hour, without a break 2 2 1   Lying down to rest in the afternoon, when circumstances permit 3 3 1   Sitting and talking to someone 0 0 0   Sitting quietly after lunch without alcohol 1 2 1   In a car, while stopped for a few minutes in traffic 0 0 0   [place] Sleepiness Score 10 13 5                  Past Medical History:  Past Medical History:   Diagnosis Date    CAD (coronary artery disease) 6-21-02 8-5-06 [address]: +inferolat ischemic    Diabetes ([facility]     Elevated PSA 11/21/2011    Essential hypertension     Gout     Heartburn 12-4-03    Hemorrhoids     Hypercholesterolemia 7-15-08    TSH 0.88    Hyperlipidemia     Personal history of prostate cancer     Prostate cancer ([facility] 03/25/2016    Darvin 7 in 1/12 cores and Darvin 6 in 2/12 cores. PSA 6.0ng/mL. TRUS Volume 143 grams    Psoriasis     Rotator cuff tear, right     followed by Dr. Darlen Mcburney ([facility])    Shoulder pain, right 1/27/2012    FIDEL (stress urinary incontinence), male     Vitamin D deficiency 11/25/2011       Past Surgical History:  Past Surgical History:   Procedure Laterality Date    HX KNEE ARTHROSCOPY      HX PROSTATECTOMY  11/16/2016    Robotic-assisted laparoscopic radical prostatectomy. Robotic-assisted laparoscopic bilateral pelvic lymph node dissection  .Dissection was very difficult, bladder neck repair/anastamosis complex and time for this was >50% of time ordinarily required for this procedure.  HX RADICAL PROSTATECTOMY      ME COLONOSCOPY FLX DX W/COLLJ SPEC WHEN PFRMD  4-30-01    papules/ Christiano    ME COLONOSCOPY FLX DX W/COLLJ SPEC WHEN PFRMD      normal . Dr. Rian Infante       Family History:  Family History   Problem Relation Age of Onset    Other Brother         vocal cord polyps    Cancer Brother         throat    Heart Disease Brother     Heart Attack Brother     Cancer Mother         leukemia       Social History:  Social History     Tobacco Use    Smoking status: Never Smoker    Smokeless tobacco: Never Used   Substance Use Topics    Alcohol use:  Yes     Alcohol/week: 0.8 - 1.7 standard drinks     Types: 1 - 2 Cans of beer per week     Comment: Socially    Drug use: No        Caffeine Amount Time of last Intake Comments   Coffee None     Soda 1 can QOD  Regular, caffinated   Tea 1 glass QOD     Energy Drinks None     Over- the - counter stimulant pills None     Other Substances      Alcohol 1 drink Q 3 days  Wine, wiskey   Tobacco Never     Drugs Marijuana- smokes  rarem use    H/O Cocaine   >5 years go       Medications:  Current Outpatient Medications on File Prior to Visit   Medication Sig Dispense Refill    cholecalciferol (VITAMIN D3) (1000 Units /25 mcg) tablet TAKE TWO TABLETS BY MOUTH EVERY DAY ONCE WEEKLY CHOLECALCIFEROL IS COMPLETED      empagliflozin (JARDIANCE) 25 mg tablet TAKE ONE-HALF TABLET BY MOUTH EVERY DAY FOR DIABETES      hydroCHLOROthiazide (HYDRODIURIL) 25 mg tablet Take 1 Tab by mouth daily. 90 Tab 0    atorvastatin (LIPITOR) 80 mg tablet Take 1 Tab by mouth daily. 30 Tab 0    losartan (COZAAR) 100 mg tablet Take 1 Tab by mouth daily. 90 Tab 0    metFORMIN (GLUCOPHAGE) 500 mg tablet Take 1 Tab by mouth two (2) times daily (with meals). 180 Tab 0    ergocalciferol (ERGOCALCIFEROL) 50,000 unit capsule Take 1 Cap by mouth every seven (7) days. 12 Cap 2    sertraline (ZOLOFT) 50 mg tablet Take 1 Tab by mouth daily. 30 Tab 3    clopidogrel (PLAVIX) 75 mg tab Take 1 Tab by mouth daily. 90 Tab 0     No current facility-administered medications on file prior to visit.         Allergy:  No Known Allergies    Review of Systems  General ROS: positive for  - fatigue and sleep disturbance- some improvement  negative for - chills, fever, malaise, weight gain or weight loss  ENT ROS: negative  Hematological and Lymphatic ROS: negative for - bleeding problems, blood clots, bruising, jaundice, pallor or swollen lymph nodes  Endocrine ROS: negative for - polydipsia/polyuria, skin changes, temperature intolerance or unexpected weight changes  Respiratory ROS: no cough, shortness of breath, or wheezing  Cardiovascular ROS: no chest pain or dyspnea on exertion  Gastrointestinal ROS: no abdominal pain, change in bowel habits, or black or bloody stools  Genito-Urinary ROS: no dysuria, trouble voiding, or hematuria  Musculoskeletal ROS: negative  Neurological ROS: no TIA or stroke symptoms  Dermatological ROS: negative for - pruritus, rash or skin lesion changes   Otherwise negative and per HPI      Physical Exam:  Blood pressure 134/80, pulse 79, temperature 97.9 °F (36.6 °C), temperature source Oral, resp. rate 14, height 5' 4\" (1.626 m), weight 81.6 kg (180 lb), SpO2 94 %. on room air, Body mass index is 30.9 kg/m². General: No distress, acyanotic, appears stated age, cooperative, pleasant  HEENT: PERRL, EOMI, throat without erythema or exudate, + Macroglossia- dental indention on tongue, Mallampati's score 4+, Uvula- midline- Tonsils- present; not fully visualized.   Neck: Supple,  no abnormally enlarged lymph nodes, thyroid is not enlarged, non-tender, No JVD  Chest: Grossly normal  Lungs: moderate air entry, clear to auscultation bilaterally,   Heart: Regular rate and rhythm, S1S2 present, without murmur  Abdomen: Obese, abdomen is soft without significant tenderness, or guarding  Extremity: negative for cyanosis or clubbing, + trace edema  Skin: Skin color, texture, turgor normal. No rashes or lesions    Data Reviewed:  CBC:   Lab Results   Component Value Date/Time    WBC 3.1 (L) 11/21/2017 11:55 AM    HGB 15.5 11/21/2017 11:55 AM    HCT 47.7 11/21/2017 11:55 AM    PLATELET 068 26/10/1122 11:55 AM    MCV 90.3 11/21/2017 11:55 AM       BMP:   Lab Results   Component Value Date/Time    Sodium 140 11/21/2017 11:55 AM    Potassium 3.5 11/21/2017 11:55 AM    Chloride 99 (L) 11/21/2017 11:55 AM    CO2 34 (H) 11/21/2017 11:55 AM    Anion gap 7 11/21/2017 11:55 AM    Glucose 177 (H) 11/21/2017 11:55 AM    BUN 28 (H) 11/21/2017 11:55 AM    Creatinine 2.12 (H) 11/21/2017 11:55 AM    BUN/Creatinine ratio 13 11/21/2017 11:55 AM    GFR est AA 38 (L) 11/21/2017 11:55 AM    GFR est non-AA 31 (L) 11/21/2017 11:55 AM    Calcium 9.3 11/21/2017 11:55 AM        TSH:  Lab Results   Component Value Date/Time    TSH 1.33 11/14/2017 02:31 PM    TSH 1.52 06/12/2017 08:10 AM    TSH 1.03 11/02/2016 11:20 AM    TSH 1.070 04/14/2014 12:51 PM    TSH 1.270 11/13/2013 11:38 AM    TSH 1.330 07/10/2012 11:30 AM    TSH 1.330 12/01/2010 09:15 AM          Lab Results   Component Value Date/Time    Hemoglobin A1c 6.9 (H) 11/14/2017 02:31 PM    Hemoglobin A1c (POC) 7.5 11/02/2016 12:12 PM         Historical Sleep Testing Data:  - HST: 4/3/18:  AHI: 26.9, DANA: 31.2  SpO2 joe: 72%        Mario Cavanaugh DO, FCCP  Pulmonary, Sleep and Critical Care Medicine

## 2021-06-21 ENCOUNTER — HOSPITAL ENCOUNTER (OUTPATIENT)
Dept: SLEEP MEDICINE | Age: 76
Discharge: HOME | End: 2021-06-21
Payer: MEDICARE

## 2021-06-21 DIAGNOSIS — F43.12 CHRONIC POST-TRAUMATIC STRESS DISORDER (PTSD): ICD-10-CM

## 2021-06-21 DIAGNOSIS — Z78.9 DIFFICULTY WITH CPAP USE: ICD-10-CM

## 2021-06-21 DIAGNOSIS — E66.9 OBESITY (BMI 30.0-34.9): ICD-10-CM

## 2021-06-21 DIAGNOSIS — G47.33 OSA (OBSTRUCTIVE SLEEP APNEA): ICD-10-CM

## 2021-06-21 PROCEDURE — 95806 SLEEP STUDY UNATT&RESP EFFT: CPT

## 2021-06-22 ENCOUNTER — HOSPITAL ENCOUNTER (OUTPATIENT)
Dept: SLEEP MEDICINE | Age: 76
Discharge: HOME | End: 2021-06-22
Payer: MEDICARE

## 2021-06-22 PROCEDURE — 95806 SLEEP STUDY UNATT&RESP EFFT: CPT

## 2021-06-28 ENCOUNTER — TELEPHONE (OUTPATIENT)
Dept: PULMONOLOGY | Age: 76
End: 2021-06-28

## 2021-06-28 DIAGNOSIS — G47.33 OSA (OBSTRUCTIVE SLEEP APNEA): Primary | ICD-10-CM

## 2021-06-28 NOTE — PROGRESS NOTES
APAP 7/20 cwp order faxed to [address] [place] [address], [phone]. Patient contacted and study results reviewed with him. DME info provided to patient and he is encouraged to contact our office with any additional questions or concerns he may have. Patient should hear from [address] [place] [address] within 2 weeks. If not, I have requested they call the office to let me know.

## 2021-11-01 ENCOUNTER — IMPORTED ENCOUNTER (OUTPATIENT)
Dept: URBAN - METROPOLITAN AREA CLINIC 1 | Facility: CLINIC | Age: 76
End: 2021-11-01

## 2021-11-01 PROCEDURE — 92134 CPTRZ OPH DX IMG PST SGM RTA: CPT

## 2021-11-01 PROCEDURE — 99204 OFFICE O/P NEW MOD 45 MIN: CPT

## 2021-11-01 PROCEDURE — 92015 DETERMINE REFRACTIVE STATE: CPT

## 2021-11-01 NOTE — PATIENT DISCUSSION
OHTN OD (CD: 0.45) IOP increased to 30 OD today. Start Simbrinza BID OD (sample given) Pt had recent injection with Dr. Gabriela Mendez last week. - Observe for changes/progression. Patient to continue with current treatment regimen.

## 2021-11-01 NOTE — PATIENT DISCUSSION
1.  DM Type II (Oral Meds) Moderate NPDR with DME regressed following treatment OD verified by MAC OCT today. Pt to keep scheduled appts with Dr. Murphy Machado for injections. 2.  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 Stressed the importance of strong glucose control. Advised of importance of at least yearly dilated examinations but to contact us immediately for any problems or concerns. 3. Cataract OU -- Visually Significant secondary to glare discussed the risks benefits alternatives and limitations of cataract surgery. The patient stated a full understanding and a desire to proceed with the procedure. The patient will need to return for preop appointment with cataract measurements and to have any additional questions answered and start pre-operative eye drops as directed. Phaco PCL OS then OD **PMG did not see**Otherwise follow-up 6 month 10/DFE/Glare4. AMILCAR w/ PEK OU -- Recommend ATs TID OU routinely. 5.  OHTN OD (CD: 0.45) IOP increased to 30 OD today. Start Simbrinza BID OD (sample given). Pt had recent injection with Dr. Murphy Mcahado last week. Observe for changes/progression. Patient to continue with current treatment regimen. 6. Papilloma RUL -- Observe. 7. Pinguecula OU -- ObserveReturn for an appointment in Friday 10/IOP Check with Dr. Deborah Zamora. Return for an appointment for 10 and Ascan/H and P. with Dr. Gopi Adkins.

## 2021-11-02 PROBLEM — H40.051: Noted: 2021-11-01

## 2021-11-02 PROBLEM — H04.123: Noted: 2021-11-01

## 2021-11-02 PROBLEM — H25.813: Noted: 2021-11-01

## 2021-11-02 PROBLEM — Z79.84: Noted: 2021-11-01

## 2021-11-02 PROBLEM — E11.3292: Noted: 2021-11-02

## 2021-11-02 PROBLEM — E11.37X1: Noted: 2021-11-01

## 2021-11-02 PROBLEM — H16.143: Noted: 2021-11-01

## 2021-11-05 ENCOUNTER — IMPORTED ENCOUNTER (OUTPATIENT)
Dept: URBAN - METROPOLITAN AREA CLINIC 1 | Facility: CLINIC | Age: 76
End: 2021-11-05

## 2021-11-05 PROBLEM — H40.051: Noted: 2021-11-05

## 2021-11-05 PROCEDURE — 99213 OFFICE O/P EST LOW 20 MIN: CPT

## 2021-11-05 NOTE — PATIENT DISCUSSION
1.  OHTN OD (CD: 0.45) IOP in better control today at 17 OD today. Continue Simbrinza BID OD until sample gone. Pt had recent injection with Dr. Radha Randolph last week. Observe for changes/progression. Patient to continue with current treatment regimen. 2. Cataract OU -- Visually Significant secondary to glare -- waiting for a surgery coordinator to call to set up the surgery. (patient and wife net with  the other day but waiting on the schedule to be put into the system) 3. DM Type II (Oral Meds) Moderate NPDR with DME regressed following treatment OD. Pt to keep scheduled appts with Dr. Radha Randolph for injections. 4.  DM Type II (Oral Meds) with mild Nonproliferative Diabetic Retinopathy OS No Macular Edema5. AMILCAR w/ PEK OU -- Recommend ATs TID OU routinely. 6.  Papilloma RUL -- Observe. 7. Pinguecula OU -- ObserveReturn for an appointment for 10 and Ascan/H and P. with Dr. Salas Jose.

## 2022-02-01 ENCOUNTER — PREPPED CHART (OUTPATIENT)
Dept: URBAN - METROPOLITAN AREA CLINIC 1 | Facility: CLINIC | Age: 77
End: 2022-02-01

## 2022-02-01 NOTE — PATIENT DISCUSSION
IOP in better control today at 17 OD. continue Simbrinza BID OD until sample gone. Pt had recent injection with Dr. Katerina Vaughan last week. Observe for changes/progression. Patient to continue current treatment regimen.

## 2022-02-01 NOTE — PATIENT DISCUSSION
Moderate NPDR with DME regressed following treatment OD. Pt to keep schedule appts with Dr. Leonidas Lynn for injections.

## 2022-02-01 NOTE — PATIENT DISCUSSION
Cataract referral - I have explained to the patient that the cataract in one or both eyes is significantly affecting the quality of vision and that cataract surgery would likely result in improvement in visual function. The patient will be referred to a cataract surgeon who will further evaluate the cataract and discuss recommendations for treatment including options for different types of lens implants.

## 2022-02-06 ASSESSMENT — VISUAL ACUITY
OS_SC: 20/30+2
OD_SC: 20/30+1

## 2022-02-06 ASSESSMENT — TONOMETRY
OD_IOP_MMHG: 17
OS_IOP_MMHG: 12

## 2022-02-07 ENCOUNTER — PRE-OP/H&P (OUTPATIENT)
Dept: URBAN - METROPOLITAN AREA CLINIC 1 | Facility: CLINIC | Age: 77
End: 2022-02-07

## 2022-02-07 DIAGNOSIS — H25.13: ICD-10-CM

## 2022-02-07 DIAGNOSIS — H40.051: ICD-10-CM

## 2022-02-07 DIAGNOSIS — E11.3393: ICD-10-CM

## 2022-02-07 PROCEDURE — 92136 OPHTHALMIC BIOMETRY: CPT

## 2022-02-07 PROCEDURE — 92012 INTRM OPH EXAM EST PATIENT: CPT

## 2022-02-07 RX ORDER — BROMFENAC SODIUM 0.7 MG/ML: 1 SOLUTION/ DROPS OPHTHALMIC ONCE A DAY

## 2022-02-07 RX ORDER — LOTEPREDNOL ETABONATE AND TOBRAMYCIN 5; 3 MG/ML; MG/ML: 1 SUSPENSION/ DROPS OPHTHALMIC TWICE A DAY

## 2022-02-07 ASSESSMENT — VISUAL ACUITY
OD_BAT: 20/100
OS_SC: 20/30+2
OS_BAT: 20/100
OD_SC: 20/30+1

## 2022-02-07 ASSESSMENT — TONOMETRY
OD_IOP_MMHG: 21
OS_IOP_MMHG: 14

## 2022-02-07 NOTE — PATIENT DISCUSSION
IOP in better control today at 17 OD. continue Simbrinza BID OD until sample gone. Pt had recent injection with Dr. Goldie Bob last week. Observe for changes/progression. Patient to continue current treatment regimen.

## 2022-02-07 NOTE — PATIENT DISCUSSION
(Ascan/HP) Cataract OS -- Visually Significant (Secondary to glare), discussed the risks, benefits, alternatives, and limitations of cataract surgery. The patient stated a full understanding and a desire to proceed with the procedure. Discussed with patient if post-op drops are more than $120 for all three combined when filling at their Pharmacy, please call our office to request generic substitutions. Phaco PCL OS.

## 2022-02-07 NOTE — PATIENT DISCUSSION
Moderate NPDR with DME regressed following treatment OD. Pt to keep schedule appts with Dr. Apoorva Dallas for injections.

## 2022-02-24 ENCOUNTER — DOCUMENTATION ONLY (OUTPATIENT)
Dept: PULMONOLOGY | Age: 77
End: 2022-02-24

## 2022-02-24 NOTE — PROGRESS NOTES
Wife wanted Dr. Royal Pagan to know that pt had cardiac problems and had a stent placed on 2/23. Pt is in the hospital.      Wife will call to make sleep fu once pt is feeling better and up to it.

## 2022-03-18 PROBLEM — R27.0 ATAXIA: Status: ACTIVE | Noted: 2018-02-27

## 2022-03-19 PROBLEM — E11.21 TYPE 2 DIABETES WITH NEPHROPATHY (HCC): Status: ACTIVE | Noted: 2018-02-09

## 2022-03-19 PROBLEM — N39.3 SUI (STRESS URINARY INCONTINENCE), MALE: Status: ACTIVE | Noted: 2017-04-06

## 2022-03-19 PROBLEM — F43.12 CHRONIC POST-TRAUMATIC STRESS DISORDER (PTSD): Status: ACTIVE | Noted: 2018-02-09

## 2022-03-19 PROBLEM — R42 DIZZINESS: Status: ACTIVE | Noted: 2018-02-12

## 2022-03-19 PROBLEM — E86.0 DEHYDRATION: Status: ACTIVE | Noted: 2018-02-12

## 2022-03-19 PROBLEM — R55 NEAR SYNCOPE: Status: ACTIVE | Noted: 2018-02-12

## 2022-04-02 ASSESSMENT — TONOMETRY
OD_IOP_MMHG: 17
OS_IOP_MMHG: 11
OD_IOP_MMHG: 30
OS_IOP_MMHG: 12

## 2022-04-02 ASSESSMENT — VISUAL ACUITY
OD_CC: 20/30
OS_CC: 20/40
OD_GLARE: 20/100
OS_CC: 20/30+2
OD_CC: 20/30+1

## 2022-04-11 ENCOUNTER — FOLLOW UP (OUTPATIENT)
Dept: URBAN - METROPOLITAN AREA CLINIC 1 | Facility: CLINIC | Age: 77
End: 2022-04-11

## 2022-04-11 DIAGNOSIS — E11.3393: ICD-10-CM

## 2022-04-11 DIAGNOSIS — H25.13: ICD-10-CM

## 2022-04-11 DIAGNOSIS — H40.51X1: ICD-10-CM

## 2022-04-11 PROCEDURE — 92012 INTRM OPH EXAM EST PATIENT: CPT

## 2022-04-11 ASSESSMENT — TONOMETRY
OD_IOP_MMHG: 19
OS_IOP_MMHG: 13

## 2022-04-11 ASSESSMENT — VISUAL ACUITY
OD_SC: 20/40-2
OD_BAT: 20/80
OS_SC: 20/40
OS_BAT: 20/80

## 2022-04-11 NOTE — PATIENT DISCUSSION
IOP stable today 19 OD  on Cosopt and Alphagan BID OU. H/o injections with Dr. George Buckley (last injection March). Cont gtt regimen.

## 2022-04-11 NOTE — PATIENT DISCUSSION
Visually significant, patient recently had heart stent placements in March and had to cancel Phaco/PCL.  Patient is ready to proceed at this time. Discussed with patient today will obtain cardiac clearance and schedule Phaco. Patient will not need to d/c any cardiac meds for cataract surgery. Surgery coordinator will call to schedule Phaco/PCL OS then OD.

## 2022-04-11 NOTE — PATIENT DISCUSSION
Moderate NPDR with DME regressed following treatment OD. Pt to keep schedule appts with Dr. Damir Chew for injections.

## 2023-05-08 PROBLEM — Z86.73 HISTORY OF ISCHEMIC STROKE: Status: ACTIVE | Noted: 2023-05-08

## 2023-05-08 PROBLEM — E66.09 CLASS 1 OBESITY DUE TO EXCESS CALORIES WITH SERIOUS COMORBIDITY AND BODY MASS INDEX (BMI) OF 30.0 TO 30.9 IN ADULT: Status: ACTIVE | Noted: 2023-05-08

## 2023-05-08 PROBLEM — M1A.9XX0 CHRONIC GOUT WITHOUT TOPHUS: Status: ACTIVE | Noted: 2023-05-08

## 2023-05-08 PROBLEM — N52.9 ERECTILE DYSFUNCTION: Status: ACTIVE | Noted: 2023-05-08

## 2023-05-08 PROBLEM — E66.811 CLASS 1 OBESITY DUE TO EXCESS CALORIES WITH SERIOUS COMORBIDITY AND BODY MASS INDEX (BMI) OF 30.0 TO 30.9 IN ADULT: Status: ACTIVE | Noted: 2023-05-08

## 2023-05-10 ENCOUNTER — OFFICE VISIT (OUTPATIENT)
Facility: CLINIC | Age: 78
End: 2023-05-10
Payer: MEDICARE

## 2023-05-10 VITALS
HEIGHT: 64 IN | WEIGHT: 180 LBS | HEART RATE: 67 BPM | RESPIRATION RATE: 18 BRPM | DIASTOLIC BLOOD PRESSURE: 81 MMHG | BODY MASS INDEX: 30.73 KG/M2 | SYSTOLIC BLOOD PRESSURE: 144 MMHG | OXYGEN SATURATION: 97 % | TEMPERATURE: 97.1 F

## 2023-05-10 DIAGNOSIS — M25.512 CHRONIC LEFT SHOULDER PAIN: ICD-10-CM

## 2023-05-10 DIAGNOSIS — Z86.73 HISTORY OF ISCHEMIC STROKE: ICD-10-CM

## 2023-05-10 DIAGNOSIS — I10 ESSENTIAL HYPERTENSION: ICD-10-CM

## 2023-05-10 DIAGNOSIS — F33.0 MILD EPISODE OF RECURRENT MAJOR DEPRESSIVE DISORDER (HCC): ICD-10-CM

## 2023-05-10 DIAGNOSIS — Z99.89 OSA ON CPAP: ICD-10-CM

## 2023-05-10 DIAGNOSIS — E11.9 ENCOUNTER FOR DIABETIC FOOT EXAM (HCC): ICD-10-CM

## 2023-05-10 DIAGNOSIS — Z00.00 MEDICARE ANNUAL WELLNESS VISIT, SUBSEQUENT: Primary | ICD-10-CM

## 2023-05-10 DIAGNOSIS — E78.2 MIXED HYPERLIPIDEMIA: ICD-10-CM

## 2023-05-10 DIAGNOSIS — E66.09 CLASS 1 OBESITY DUE TO EXCESS CALORIES WITH SERIOUS COMORBIDITY AND BODY MASS INDEX (BMI) OF 30.0 TO 30.9 IN ADULT: ICD-10-CM

## 2023-05-10 DIAGNOSIS — E11.65 UNCONTROLLED TYPE 2 DIABETES MELLITUS WITH HYPERGLYCEMIA (HCC): ICD-10-CM

## 2023-05-10 DIAGNOSIS — G89.29 CHRONIC LEFT SHOULDER PAIN: ICD-10-CM

## 2023-05-10 DIAGNOSIS — E11.319 DIABETIC RETINOPATHY ASSOCIATED WITH TYPE 2 DIABETES MELLITUS, MACULAR EDEMA PRESENCE UNSPECIFIED, UNSPECIFIED LATERALITY, UNSPECIFIED RETINOPATHY SEVERITY (HCC): ICD-10-CM

## 2023-05-10 DIAGNOSIS — M1A.9XX0 CHRONIC GOUT WITHOUT TOPHUS, UNSPECIFIED CAUSE, UNSPECIFIED SITE: ICD-10-CM

## 2023-05-10 DIAGNOSIS — E55.9 VITAMIN D DEFICIENCY: ICD-10-CM

## 2023-05-10 DIAGNOSIS — N52.9 ERECTILE DYSFUNCTION, UNSPECIFIED ERECTILE DYSFUNCTION TYPE: ICD-10-CM

## 2023-05-10 DIAGNOSIS — G47.33 OSA ON CPAP: ICD-10-CM

## 2023-05-10 DIAGNOSIS — Z12.11 SCREEN FOR COLON CANCER: ICD-10-CM

## 2023-05-10 DIAGNOSIS — Z76.89 ENCOUNTER TO ESTABLISH CARE: ICD-10-CM

## 2023-05-10 PROBLEM — R55 NEAR SYNCOPE: Status: RESOLVED | Noted: 2018-02-12 | Resolved: 2023-05-10

## 2023-05-10 PROCEDURE — 99204 OFFICE O/P NEW MOD 45 MIN: CPT | Performed by: FAMILY MEDICINE

## 2023-05-10 PROCEDURE — 3077F SYST BP >= 140 MM HG: CPT | Performed by: FAMILY MEDICINE

## 2023-05-10 PROCEDURE — G0439 PPPS, SUBSEQ VISIT: HCPCS | Performed by: FAMILY MEDICINE

## 2023-05-10 PROCEDURE — 3078F DIAST BP <80 MM HG: CPT | Performed by: FAMILY MEDICINE

## 2023-05-10 PROCEDURE — 1123F ACP DISCUSS/DSCN MKR DOCD: CPT | Performed by: FAMILY MEDICINE

## 2023-05-10 RX ORDER — HYDROCHLOROTHIAZIDE 12.5 MG/1
12.5 CAPSULE, GELATIN COATED ORAL DAILY
COMMUNITY
End: 2023-05-10 | Stop reason: DRUGHIGH

## 2023-05-10 RX ORDER — HYDROCHLOROTHIAZIDE 25 MG/1
25 TABLET ORAL DAILY
COMMUNITY
End: 2023-05-10 | Stop reason: SDUPTHER

## 2023-05-10 RX ORDER — METOPROLOL SUCCINATE 25 MG/1
25 TABLET, EXTENDED RELEASE ORAL DAILY
COMMUNITY

## 2023-05-10 RX ORDER — HYDROCHLOROTHIAZIDE 25 MG/1
25 TABLET ORAL DAILY
Qty: 90 TABLET | Refills: 2 | Status: SHIPPED | OUTPATIENT
Start: 2023-05-10

## 2023-05-10 SDOH — ECONOMIC STABILITY: INCOME INSECURITY: HOW HARD IS IT FOR YOU TO PAY FOR THE VERY BASICS LIKE FOOD, HOUSING, MEDICAL CARE, AND HEATING?: NOT HARD AT ALL

## 2023-05-10 SDOH — ECONOMIC STABILITY: HOUSING INSECURITY
IN THE LAST 12 MONTHS, WAS THERE A TIME WHEN YOU DID NOT HAVE A STEADY PLACE TO SLEEP OR SLEPT IN A SHELTER (INCLUDING NOW)?: NO

## 2023-05-10 SDOH — ECONOMIC STABILITY: FOOD INSECURITY: WITHIN THE PAST 12 MONTHS, YOU WORRIED THAT YOUR FOOD WOULD RUN OUT BEFORE YOU GOT MONEY TO BUY MORE.: NEVER TRUE

## 2023-05-10 SDOH — ECONOMIC STABILITY: FOOD INSECURITY: WITHIN THE PAST 12 MONTHS, THE FOOD YOU BOUGHT JUST DIDN'T LAST AND YOU DIDN'T HAVE MONEY TO GET MORE.: NEVER TRUE

## 2023-05-10 ASSESSMENT — PATIENT HEALTH QUESTIONNAIRE - PHQ9
SUM OF ALL RESPONSES TO PHQ QUESTIONS 1-9: 1
6. FEELING BAD ABOUT YOURSELF - OR THAT YOU ARE A FAILURE OR HAVE LET YOURSELF OR YOUR FAMILY DOWN: 1
8. MOVING OR SPEAKING SO SLOWLY THAT OTHER PEOPLE COULD HAVE NOTICED. OR THE OPPOSITE, BEING SO FIGETY OR RESTLESS THAT YOU HAVE BEEN MOVING AROUND A LOT MORE THAN USUAL: 1
SUM OF ALL RESPONSES TO PHQ9 QUESTIONS 1 & 2: 1
SUM OF ALL RESPONSES TO PHQ QUESTIONS 1-9: 5
5. POOR APPETITE OR OVEREATING: 0
SUM OF ALL RESPONSES TO PHQ QUESTIONS 1-9: 6
SUM OF ALL RESPONSES TO PHQ QUESTIONS 1-9: 1
10. IF YOU CHECKED OFF ANY PROBLEMS, HOW DIFFICULT HAVE THESE PROBLEMS MADE IT FOR YOU TO DO YOUR WORK, TAKE CARE OF THINGS AT HOME, OR GET ALONG WITH OTHER PEOPLE: 0
SUM OF ALL RESPONSES TO PHQ QUESTIONS 1-9: 1
1. LITTLE INTEREST OR PLEASURE IN DOING THINGS: 0
SUM OF ALL RESPONSES TO PHQ QUESTIONS 1-9: 6
SUM OF ALL RESPONSES TO PHQ QUESTIONS 1-9: 1
SUM OF ALL RESPONSES TO PHQ9 QUESTIONS 1 & 2: 0
3. TROUBLE FALLING OR STAYING ASLEEP: 0
1. LITTLE INTEREST OR PLEASURE IN DOING THINGS: 0
4. FEELING TIRED OR HAVING LITTLE ENERGY: 2
7. TROUBLE CONCENTRATING ON THINGS, SUCH AS READING THE NEWSPAPER OR WATCHING TELEVISION: 1
2. FEELING DOWN, DEPRESSED OR HOPELESS: 0
9. THOUGHTS THAT YOU WOULD BE BETTER OFF DEAD, OR OF HURTING YOURSELF: 1
2. FEELING DOWN, DEPRESSED OR HOPELESS: 1
SUM OF ALL RESPONSES TO PHQ QUESTIONS 1-9: 6

## 2023-05-10 ASSESSMENT — LIFESTYLE VARIABLES
HOW MANY STANDARD DRINKS CONTAINING ALCOHOL DO YOU HAVE ON A TYPICAL DAY: PATIENT DOES NOT DRINK
HOW OFTEN DO YOU HAVE A DRINK CONTAINING ALCOHOL: NEVER

## 2023-05-10 ASSESSMENT — ENCOUNTER SYMPTOMS
NAUSEA: 0
COUGH: 0
ABDOMINAL PAIN: 0
SORE THROAT: 0
DIARRHEA: 0
VOMITING: 0
CONSTIPATION: 0

## 2023-05-10 NOTE — PROGRESS NOTES
[place]               [facility]               [phone]        Silvana Núñez is a 70 y.o. male and presents with New Patient and Medicare AWV           Assessment/Plan:  Nancy De La Garza was seen today for new patient and medicare awv. Diagnoses and all orders for this visit:    Medicare annual wellness visit, subsequent    Encounter to establish care    Uncontrolled type 2 diabetes mellitus with hyperglycemia ([address]  -     TSH; Future  -     Microalbumin / Creatinine Urine Ratio; Future  -     Lipid Panel; Future  -     Hemoglobin A1C; Future  -     Comprehensive Metabolic Panel; Future  -     CBC with Auto Differential; Future  -     Urinalysis with Microscopic; Future  -     Vitamin D 25 Hydroxy; Future  -     metFORMIN (GLUCOPHAGE) 500 MG tablet; Take 2 tablets by mouth 2 times daily (with meals)  -     Urinalysis with Microscopic  -     Comprehensive Metabolic Panel  -     Vitamin D 25 Hydroxy  -     CBC with Auto Differential  -     Hemoglobin A1C  -     Lipid Panel  -     Microalbumin / Creatinine Urine Ratio  -     TSH    Class 1 obesity due to excess calories with serious comorbidity and body mass index (BMI) of 30.0 to 30.9 in adult  -     TSH; Future  -     Microalbumin / Creatinine Urine Ratio; Future  -     Lipid Panel; Future  -     Hemoglobin A1C; Future  -     Comprehensive Metabolic Panel; Future  -     CBC with Auto Differential; Future  -     Urinalysis with Microscopic; Future  -     Vitamin D 25 Hydroxy; Future  -     Urinalysis with Microscopic  -     Comprehensive Metabolic Panel  -     Vitamin D 25 Hydroxy  -     CBC with Auto Differential  -     Hemoglobin A1C  -     Lipid Panel  -     Microalbumin / Creatinine Urine Ratio  -     TSH    History of ischemic stroke x 4    Erectile dysfunction, unspecified erectile dysfunction type    Essential hypertension  -     TSH; Future  -     Microalbumin / Creatinine Urine Ratio; Future  -     Lipid Panel;  Future  -
Future  -     Urinalysis with Microscopic; Future  -     Vitamin D 25 Hydroxy; Future  -     metFORMIN (GLUCOPHAGE) 500 MG tablet; Take 2 tablets by mouth 2 times daily (with meals), Disp-120 tablet, R-5Normal  Class 1 obesity due to excess calories with serious comorbidity and body mass index (BMI) of 30.0 to 30.9 in adult  -     TSH; Future  -     Microalbumin / Creatinine Urine Ratio; Future  -     Lipid Panel; Future  -     Hemoglobin A1C; Future  -     Comprehensive Metabolic Panel; Future  -     CBC with Auto Differential; Future  -     Urinalysis with Microscopic; Future  -     Vitamin D 25 Hydroxy; Future  History of ischemic stroke x 4  Erectile dysfunction, unspecified erectile dysfunction type  Essential hypertension  -     TSH; Future  -     Microalbumin / Creatinine Urine Ratio; Future  -     Lipid Panel; Future  -     Hemoglobin A1C; Future  -     Comprehensive Metabolic Panel; Future  -     CBC with Auto Differential; Future  -     Urinalysis with Microscopic; Future  -     Vitamin D 25 Hydroxy; Future  -     hydroCHLOROthiazide (HYDRODIURIL) 25 MG tablet; Take 1 tablet by mouth daily, Disp-90 tablet, R-2Normal  Chronic gout without tophus, unspecified cause, unspecified site  -     Uric Acid; Future  Mixed hyperlipidemia  -     TSH; Future  -     Microalbumin / Creatinine Urine Ratio; Future  -     Lipid Panel; Future  -     Hemoglobin A1C; Future  -     Comprehensive Metabolic Panel; Future  -     CBC with Auto Differential; Future  -     Urinalysis with Microscopic; Future  -     Vitamin D 25 Hydroxy; Future  Vitamin D deficiency  -     Vitamin D 25 Hydroxy;  Future  Chronic left shoulder pain  TIO on CPAP  -     External Referral To Sleep Medicine  Screen for colon cancer  -     Cologuard (Fecal DNA Colorectal Cancer Screening)  Mild episode of recurrent major depressive disorder ([facility]  -     External Referral To Psychology  Encounter [facility] diabetic [facility] exam [facility]  Diabetic retinopathy associated

## 2023-05-11 LAB
A/G RATIO: 1.3 RATIO (ref 1.1–2.6)
ALBUMIN SERPL-MCNC: 4.1 G/DL (ref 3.5–5)
ALP BLD-CCNC: 78 U/L (ref 40–125)
ALT SERPL-CCNC: 43 U/L (ref 5–40)
ANION GAP SERPL CALCULATED.3IONS-SCNC: 11 MMOL/L (ref 3–15)
AST SERPL-CCNC: 28 U/L (ref 10–37)
AVERAGE GLUCOSE: 166 MG/DL (ref 91–123)
BACTERIA: NEGATIVE
BASOPHILS # BLD: 1 % (ref 0–2)
BASOPHILS ABSOLUTE: 0 K/UL (ref 0–0.2)
BILIRUB SERPL-MCNC: 0.4 MG/DL (ref 0.2–1.2)
BILIRUB SERPL-MCNC: NEGATIVE MG/DL
BLOOD: NEGATIVE
BUN BLDV-MCNC: 22 MG/DL (ref 6–22)
CALCIUM SERPL-MCNC: 9.6 MG/DL (ref 8.4–10.5)
CHLORIDE BLD-SCNC: 105 MMOL/L (ref 98–110)
CHOLESTEROL/HDL RATIO: 2.3 (ref 0–5)
CHOLESTEROL: 100 MG/DL (ref 110–200)
CLARITY: CLEAR
CO2: 26 MMOL/L (ref 20–32)
COLOR: YELLOW
CREAT SERPL-MCNC: 1.4 MG/DL (ref 0.8–1.6)
EOSINOPHIL # BLD: 1 % (ref 0–6)
EOSINOPHILS ABSOLUTE: 0 K/UL (ref 0–0.5)
EPITHELIAL CELLS: ABNORMAL /HPF
GLOBULIN: 3.1 G/DL (ref 2–4)
GLOMERULAR FILTRATION RATE: 50.8 ML/MIN/1.73 SQ.M.
GLUCOSE: 121 MG/DL (ref 70–99)
GLUCOSE: ABNORMAL MG/DL
HBA1C MFR BLD: 7.4 % (ref 4.8–5.6)
HCT VFR BLD CALC: 49.6 % (ref 37.8–52.2)
HDLC SERPL-MCNC: 44 MG/DL
HEMOGLOBIN: 15.1 G/DL (ref 12.6–17.1)
HYALINE CASTS: ABNORMAL /LPF (ref 0–2)
KETONES, URINE: ABNORMAL MG/DL
LDL CHOLESTEROL CALCULATED: 42 MG/DL (ref 50–99)
LDL/HDL RATIO: 1
LEUKOCYTE ESTERASE, URINE: NEGATIVE
LYMPHOCYTES # BLD: 30 % (ref 20–45)
LYMPHOCYTES ABSOLUTE: 1.4 K/UL (ref 1–4.8)
MCH RBC QN AUTO: 30 PG (ref 26–34)
MCHC RBC AUTO-ENTMCNC: 30 G/DL (ref 31–36)
MCV RBC AUTO: 99 FL (ref 80–95)
MONOCYTES ABSOLUTE: 0.5 K/UL (ref 0.1–1)
MONOCYTES: 10 % (ref 3–12)
NEUTROPHILS ABSOLUTE: 2.8 K/UL (ref 1.8–7.7)
NEUTROPHILS: 58 % (ref 40–75)
NITRITE, URINE: NEGATIVE
NON-HDL CHOLESTEROL: 56 MG/DL
PDW BLD-RTO: 15.4 % (ref 10–15.5)
PH, URINE: 5 PH (ref 5–8)
PLATELET # BLD: 249 K/UL (ref 140–440)
PMV BLD AUTO: 10.8 FL (ref 9–13)
POTASSIUM SERPL-SCNC: 4.2 MMOL/L (ref 3.5–5.5)
PROTEIN UA: ABNORMAL MG/DL
RBC URINE: ABNORMAL /HPF
RBC: 5 M/UL (ref 3.8–5.8)
SODIUM BLD-SCNC: 142 MMOL/L (ref 133–145)
SPECIFIC GRAVITY: 1.03 (ref 1–1.03)
TOTAL PROTEIN: 7.2 G/DL (ref 6.2–8.1)
TRIGL SERPL-MCNC: 69 MG/DL (ref 40–149)
TSH SERPL DL<=0.05 MIU/L-ACNC: 1.63 MCU/ML (ref 0.27–4.2)
URIC ACID: 4.7 MG/DL (ref 3.9–9)
UROBILINOGEN: 0.2 MG/DL
VITAMIN D 25-HYDROXY: 66.8 NG/ML (ref 32–100)
VLDLC SERPL CALC-MCNC: 14 MG/DL (ref 8–30)
WBC UA: ABNORMAL /HPF (ref 0–2)
WBC: 4.8 K/UL (ref 4–11)

## 2023-05-15 DIAGNOSIS — I10 ESSENTIAL HYPERTENSION: ICD-10-CM

## 2023-05-16 ENCOUNTER — TELEPHONE (OUTPATIENT)
Facility: CLINIC | Age: 78
End: 2023-05-16

## 2023-05-16 DIAGNOSIS — R74.01 ELEVATED ALT MEASUREMENT: ICD-10-CM

## 2023-05-16 DIAGNOSIS — E66.09 CLASS 1 OBESITY DUE TO EXCESS CALORIES WITH SERIOUS COMORBIDITY AND BODY MASS INDEX (BMI) OF 31.0 TO 31.9 IN ADULT: ICD-10-CM

## 2023-05-16 DIAGNOSIS — K75.81 NASH (NONALCOHOLIC STEATOHEPATITIS): Chronic | ICD-10-CM

## 2023-05-16 DIAGNOSIS — R97.20 ELEVATED PSA: Primary | ICD-10-CM

## 2023-05-16 DIAGNOSIS — K75.81 NONALCOHOLIC STEATOHEPATITIS (NASH): ICD-10-CM

## 2023-05-16 PROBLEM — R27.0 ATAXIA: Status: RESOLVED | Noted: 2018-02-27 | Resolved: 2023-05-16

## 2023-05-16 PROBLEM — E11.21 TYPE 2 DIABETES WITH NEPHROPATHY (HCC): Status: RESOLVED | Noted: 2018-02-09 | Resolved: 2023-05-16

## 2023-05-16 PROBLEM — Z86.73 HISTORY OF ISCHEMIC STROKE: Status: RESOLVED | Noted: 2023-05-08 | Resolved: 2023-05-16

## 2023-05-16 PROBLEM — R42 DIZZINESS: Status: RESOLVED | Noted: 2018-02-12 | Resolved: 2023-05-16

## 2023-05-16 PROBLEM — E86.0 DEHYDRATION: Status: RESOLVED | Noted: 2018-02-12 | Resolved: 2023-05-16

## 2023-05-16 NOTE — TELEPHONE ENCOUNTER
Your sugars are still WELL CONTROLLED (a1c <8%). You can STOP checking your sugars regularly. Only check them if you feel sick (to make sure your sugars are not LOW). Your kidneys show CHRONIC KIDNEY DISEASE stage 3. This is likely from the diabetes or high blood pressure. We will continue to monitor this. Please avoid any motrin/ibuprofen/aleve/naproxen or other NSAID products because that will DAMAGE the kidneys more. You can use tylenol/acetaminophen for pain as needed. Your liver test, ALT, was HIGH. I recommend a liver ultrasound to further evaluate for possible nonalcoholic fatty liver disease. Call [phone] for Central Scheduling to arrange this. Your blood count was NORMAL    Your total/bad cholesterol LDL were at GOAL <50 LDL. Your good cholesterol HDL was NORMAL.     You also had a NORMAL uric acid, vitamin D and thyroid test.

## 2023-05-17 ENCOUNTER — TELEPHONE (OUTPATIENT)
Facility: CLINIC | Age: 78
End: 2023-05-17

## 2023-05-17 ENCOUNTER — OFFICE VISIT (OUTPATIENT)
Facility: CLINIC | Age: 78
End: 2023-05-17
Payer: MEDICARE

## 2023-05-17 VITALS
BODY MASS INDEX: 30.9 KG/M2 | SYSTOLIC BLOOD PRESSURE: 148 MMHG | WEIGHT: 181 LBS | TEMPERATURE: 98.4 F | OXYGEN SATURATION: 99 % | DIASTOLIC BLOOD PRESSURE: 82 MMHG | HEART RATE: 73 BPM | RESPIRATION RATE: 16 BRPM | HEIGHT: 64 IN

## 2023-05-17 DIAGNOSIS — I25.10 ATHEROSCLEROSIS OF NATIVE CORONARY ARTERY OF NATIVE HEART WITHOUT ANGINA PECTORIS: ICD-10-CM

## 2023-05-17 DIAGNOSIS — I10 UNCONTROLLED HYPERTENSION: Primary | ICD-10-CM

## 2023-05-17 DIAGNOSIS — E11.22 CONTROLLED TYPE 2 DIABETES MELLITUS WITH STAGE 3 CHRONIC KIDNEY DISEASE, WITHOUT LONG-TERM CURRENT USE OF INSULIN (HCC): ICD-10-CM

## 2023-05-17 DIAGNOSIS — N18.30 CONTROLLED TYPE 2 DIABETES MELLITUS WITH STAGE 3 CHRONIC KIDNEY DISEASE, WITHOUT LONG-TERM CURRENT USE OF INSULIN (HCC): ICD-10-CM

## 2023-05-17 DIAGNOSIS — E66.09 CLASS 1 OBESITY DUE TO EXCESS CALORIES WITH SERIOUS COMORBIDITY AND BODY MASS INDEX (BMI) OF 31.0 TO 31.9 IN ADULT: ICD-10-CM

## 2023-05-17 DIAGNOSIS — N18.31 STAGE 3A CHRONIC KIDNEY DISEASE (HCC): ICD-10-CM

## 2023-05-17 DIAGNOSIS — I10 UNCONTROLLED HYPERTENSION: ICD-10-CM

## 2023-05-17 PROBLEM — E11.3311 TYPE 2 DIABETES MELLITUS WITH MODERATE NONPROLIFERATIVE DIABETIC RETINOPATHY WITH MACULAR EDEMA, RIGHT EYE (HCC): Status: ACTIVE | Noted: 2023-05-17

## 2023-05-17 PROBLEM — H25.813 COMBINED FORMS OF AGE-RELATED CATARACT, BILATERAL: Status: ACTIVE | Noted: 2023-05-17

## 2023-05-17 PROBLEM — F43.10 POSTTRAUMATIC STRESS DISORDER: Status: ACTIVE | Noted: 2023-05-17

## 2023-05-17 PROBLEM — E11.311 DIABETIC MACULAR EDEMA (HCC): Status: ACTIVE | Noted: 2023-05-17

## 2023-05-17 PROBLEM — Z85.46 HISTORY OF PROSTATE CANCER: Status: ACTIVE | Noted: 2023-05-17

## 2023-05-17 PROBLEM — Z90.79 S/P PROSTATECTOMY: Status: ACTIVE | Noted: 2022-08-10

## 2023-05-17 PROBLEM — C61 MALIGNANT NEOPLASM OF PROSTATE (HCC): Status: ACTIVE | Noted: 2023-05-17

## 2023-05-17 PROCEDURE — 99214 OFFICE O/P EST MOD 30 MIN: CPT | Performed by: FAMILY MEDICINE

## 2023-05-17 PROCEDURE — 1123F ACP DISCUSS/DSCN MKR DOCD: CPT | Performed by: FAMILY MEDICINE

## 2023-05-17 PROCEDURE — 3077F SYST BP >= 140 MM HG: CPT | Performed by: FAMILY MEDICINE

## 2023-05-17 PROCEDURE — 3079F DIAST BP 80-89 MM HG: CPT | Performed by: FAMILY MEDICINE

## 2023-05-17 PROCEDURE — 3051F HG A1C>EQUAL 7.0%<8.0%: CPT | Performed by: FAMILY MEDICINE

## 2023-05-17 RX ORDER — AMLODIPINE BESYLATE 2.5 MG/1
2.5 TABLET ORAL DAILY
Qty: 90 TABLET | Refills: 2 | Status: SHIPPED | OUTPATIENT
Start: 2023-05-17

## 2023-05-17 RX ORDER — AMLODIPINE BESYLATE 2.5 MG/1
2.5 TABLET ORAL DAILY
Qty: 90 TABLET | Refills: 2 | Status: SHIPPED | OUTPATIENT
Start: 2023-05-17 | End: 2023-05-17 | Stop reason: SDUPTHER

## 2023-05-17 ASSESSMENT — ENCOUNTER SYMPTOMS: SHORTNESS OF BREATH: 0

## 2023-05-17 NOTE — TELEPHONE ENCOUNTER
Pt spouse called and asked if they could have Amlodipine Rx sent to [address] located in [place] instead, Please advise.

## 2023-05-17 NOTE — TELEPHONE ENCOUNTER
I contacted wife; states she will  30 day supply at [place] and obtain 90 day supply through [place]; rx sent per their request

## 2023-05-17 NOTE — PROGRESS NOTES
[address] [facility] [address]               [phone]        Mayte Reeves is a 70 y.o. male and presents with Blood Pressure Check           Assessment/Plan:  Ney Cummings was seen today for blood pressure check. Diagnoses and all orders for this visit:    Uncontrolled hypertension  -     amLODIPine (NORVASC) 2.5 MG tablet; Take 1 tablet by mouth daily    Stage 3a chronic kidney disease ([address]    Controlled type 2 diabetes mellitus with stage 3 chronic kidney disease, without long-term current use of insulin ([facility])    Class 1 obesity due to excess calories with serious comorbidity and body mass index (BMI) of 31.0 to 31.9 in adult    Atherosclerosis of native coronary artery of native heart without angina pectoris      HTN: uncontrolled; add amlodipine 2.5 mg daily to current regimen; goal bp <130/80; hx of stroke/CAD; continue HCTZ 25, losartan 100 mg and metoprolol 25 mg daily; schedule bp check in 1 week; advised to monitor bp at home; if still elevated \">140/90, advised to contact clinic to adjust medication    CKD: at least present since 2021; Kirstie Cornejo; encouraged hydration, avoidance of nsaids and management of his HTN/DM2; a1c at goal but bp is not    DM2: controlled; continue current meds    Obesity: encouraged healthy meals for weight loss    Athersclerosis: LDL at goal <50; continue current dose of Lipitor 80 mg nightly      Follow up and disposition:   Return in about 1 week (around 5/24/2023), or if symptoms worsen or fail to improve, for hypertension.       Health Maintenance:   Health Maintenance   Topic Date Due    Diabetic retinal exam  03/03/2015    Diabetic foot exam  06/12/2018    COVID-19 Vaccine (5 - Booster for Moderna series) 06/03/2022    Flu vaccine (Season Ended) 08/01/2023    A1C test (Diabetic or Prediabetic)  11/10/2023    Prostate Specific Antigen (PSA) Screening or Monitoring  12/07/2023    DTaP/Tdap/Td vaccine (2 - Td or Tdap) 01/14/2024    Lipids

## 2023-05-18 LAB
ANION GAP SERPL CALCULATED.3IONS-SCNC: 12 MMOL/L (ref 3–15)
BUN BLDV-MCNC: 20 MG/DL (ref 6–22)
CALCIUM SERPL-MCNC: 9.6 MG/DL (ref 8.4–10.5)
CHLORIDE BLD-SCNC: 104 MMOL/L (ref 98–110)
CO2: 27 MMOL/L (ref 20–32)
CREAT SERPL-MCNC: 1.3 MG/DL (ref 0.8–1.6)
GLOMERULAR FILTRATION RATE: 54.9 ML/MIN/1.73 SQ.M.
GLUCOSE: 121 MG/DL (ref 70–99)
POTASSIUM SERPL-SCNC: 4.2 MMOL/L (ref 3.5–5.5)
SODIUM BLD-SCNC: 143 MMOL/L (ref 133–145)

## 2023-05-24 ENCOUNTER — OFFICE VISIT (OUTPATIENT)
Facility: CLINIC | Age: 78
End: 2023-05-24

## 2023-05-24 VITALS
RESPIRATION RATE: 18 BRPM | DIASTOLIC BLOOD PRESSURE: 66 MMHG | WEIGHT: 181 LBS | SYSTOLIC BLOOD PRESSURE: 135 MMHG | TEMPERATURE: 97.7 F | HEIGHT: 64 IN | BODY MASS INDEX: 30.9 KG/M2 | HEART RATE: 57 BPM | OXYGEN SATURATION: 98 %

## 2023-05-24 DIAGNOSIS — I10 ESSENTIAL HYPERTENSION: Primary | ICD-10-CM

## 2023-05-24 RX ORDER — KETOROLAC TROMETHAMINE 5 MG/ML
SOLUTION OPHTHALMIC
COMMUNITY
Start: 2023-04-27

## 2023-05-24 ASSESSMENT — PATIENT HEALTH QUESTIONNAIRE - PHQ9
1. LITTLE INTEREST OR PLEASURE IN DOING THINGS: 0
9. THOUGHTS THAT YOU WOULD BE BETTER OFF DEAD, OR OF HURTING YOURSELF: 0
SUM OF ALL RESPONSES TO PHQ QUESTIONS 1-9: 0
7. TROUBLE CONCENTRATING ON THINGS, SUCH AS READING THE NEWSPAPER OR WATCHING TELEVISION: 0
SUM OF ALL RESPONSES TO PHQ QUESTIONS 1-9: 0
SUM OF ALL RESPONSES TO PHQ QUESTIONS 1-9: 0
6. FEELING BAD ABOUT YOURSELF - OR THAT YOU ARE A FAILURE OR HAVE LET YOURSELF OR YOUR FAMILY DOWN: 0
2. FEELING DOWN, DEPRESSED OR HOPELESS: 0
3. TROUBLE FALLING OR STAYING ASLEEP: 0
SUM OF ALL RESPONSES TO PHQ QUESTIONS 1-9: 0
SUM OF ALL RESPONSES TO PHQ9 QUESTIONS 1 & 2: 0
4. FEELING TIRED OR HAVING LITTLE ENERGY: 0
8. MOVING OR SPEAKING SO SLOWLY THAT OTHER PEOPLE COULD HAVE NOTICED. OR THE OPPOSITE, BEING SO FIGETY OR RESTLESS THAT YOU HAVE BEEN MOVING AROUND A LOT MORE THAN USUAL: 0
5. POOR APPETITE OR OVEREATING: 0
10. IF YOU CHECKED OFF ANY PROBLEMS, HOW DIFFICULT HAVE THESE PROBLEMS MADE IT FOR YOU TO DO YOUR WORK, TAKE CARE OF THINGS AT HOME, OR GET ALONG WITH OTHER PEOPLE: 0

## 2023-05-24 NOTE — PROGRESS NOTES
JOSE Jackson advised me of normal bp; states patient requested lab results for recent lab; BMP reviewed with patient; continue current meds; kidney function improving but still shows CKD; continue to avoid NSAIDs; he was concerned given he was on Acular through [place] [facility]; advised him that bp is ok today, limited absorption from medication to cause worsening kidney function; he can continue his eye medications

## 2023-05-24 NOTE — PROGRESS NOTES
Per Dr. Pranav Gonzales patient presents today for Blood Pressure check. Patient seated and resting 15 min with both feet flat on the floor. Blood pressure taking and documented . Reported blood pressure to BURTON Morton.       Vitals:    05/24/23 1113   BP: 135/66   Pulse: 57   Resp: 18   Temp: 97.7 °F (36.5 °C)   SpO2: 98%

## 2023-05-25 ENCOUNTER — HOSPITAL ENCOUNTER (OUTPATIENT)
Facility: HOSPITAL | Age: 78
Discharge: HOME | End: 2023-05-28

## 2023-05-25 DIAGNOSIS — R74.01 ELEVATED ALT MEASUREMENT: ICD-10-CM

## 2023-06-02 ENCOUNTER — HOSPITAL ENCOUNTER (OUTPATIENT)
Facility: HOSPITAL | Age: 78
Discharge: HOME | End: 2023-06-02
Attending: FAMILY MEDICINE
Payer: MEDICARE

## 2023-06-02 ENCOUNTER — TELEPHONE (OUTPATIENT)
Facility: CLINIC | Age: 78
End: 2023-06-02

## 2023-06-02 DIAGNOSIS — I10 ESSENTIAL HYPERTENSION: ICD-10-CM

## 2023-06-02 PROCEDURE — 76705 ECHO EXAM OF ABDOMEN: CPT

## 2023-06-02 RX ORDER — LOSARTAN POTASSIUM 100 MG/1
100 TABLET ORAL DAILY
Qty: 90 TABLET | Refills: 2 | Status: SHIPPED | OUTPATIENT
Start: 2023-06-02

## 2023-06-02 RX ORDER — HYDROCHLOROTHIAZIDE 25 MG/1
25 TABLET ORAL DAILY
Qty: 90 TABLET | Refills: 2 | Status: SHIPPED | OUTPATIENT
Start: 2023-06-02

## 2023-06-02 NOTE — TELEPHONE ENCOUNTER
Pt spouse called states if they could have ACPZ [facility] as well as Lozartan 100MG. Please advise. If any further questions please call Mrs. Castillo back

## 2023-06-08 ENCOUNTER — TELEPHONE (OUTPATIENT)
Facility: CLINIC | Age: 78
End: 2023-06-08

## 2023-06-08 PROBLEM — K75.81 NASH (NONALCOHOLIC STEATOHEPATITIS): Chronic | Status: ACTIVE | Noted: 2023-06-08

## 2023-06-08 NOTE — TELEPHONE ENCOUNTER
Reviewed dx with patient and wife; wife is requesting referral to weight loss program to give patient more guidance; referral ordered to [facility] location [address] [facility] [address] [facility]; f/u with me in July as scheduled

## 2023-06-20 LAB — NONINV COLON CA DNA+OCC BLD SCRN STL QL: NEGATIVE

## 2023-07-15 PROBLEM — K76.0 FATTY LIVER: Status: ACTIVE | Noted: 2023-07-15

## 2023-07-17 ENCOUNTER — OFFICE VISIT (OUTPATIENT)
Facility: CLINIC | Age: 78
End: 2023-07-17
Payer: MEDICARE

## 2023-07-17 VITALS
HEIGHT: 64 IN | RESPIRATION RATE: 16 BRPM | SYSTOLIC BLOOD PRESSURE: 134 MMHG | WEIGHT: 180 LBS | OXYGEN SATURATION: 94 % | DIASTOLIC BLOOD PRESSURE: 83 MMHG | HEART RATE: 70 BPM | TEMPERATURE: 97.6 F | BODY MASS INDEX: 30.73 KG/M2

## 2023-07-17 DIAGNOSIS — E11.8 CONTROLLED TYPE 2 DIABETES MELLITUS WITH COMPLICATION, WITHOUT LONG-TERM CURRENT USE OF INSULIN (HCC): ICD-10-CM

## 2023-07-17 DIAGNOSIS — F33.0 MILD EPISODE OF RECURRENT MAJOR DEPRESSIVE DISORDER (HCC): ICD-10-CM

## 2023-07-17 DIAGNOSIS — E66.09 CLASS 1 OBESITY DUE TO EXCESS CALORIES WITH SERIOUS COMORBIDITY AND BODY MASS INDEX (BMI) OF 31.0 TO 31.9 IN ADULT: ICD-10-CM

## 2023-07-17 DIAGNOSIS — K76.0 FATTY LIVER: ICD-10-CM

## 2023-07-17 DIAGNOSIS — I10 ESSENTIAL HYPERTENSION: Primary | ICD-10-CM

## 2023-07-17 DIAGNOSIS — Z23 NEED FOR HEPATITIS A AND B VACCINATION: ICD-10-CM

## 2023-07-17 PROCEDURE — 90471 IMMUNIZATION ADMIN: CPT | Performed by: FAMILY MEDICINE

## 2023-07-17 PROCEDURE — 90632 HEPA VACCINE ADULT IM: CPT | Performed by: FAMILY MEDICINE

## 2023-07-17 PROCEDURE — 1123F ACP DISCUSS/DSCN MKR DOCD: CPT | Performed by: FAMILY MEDICINE

## 2023-07-17 PROCEDURE — G0010 ADMIN HEPATITIS B VACCINE: HCPCS | Performed by: FAMILY MEDICINE

## 2023-07-17 PROCEDURE — 3051F HG A1C>EQUAL 7.0%<8.0%: CPT | Performed by: FAMILY MEDICINE

## 2023-07-17 PROCEDURE — 3075F SYST BP GE 130 - 139MM HG: CPT | Performed by: FAMILY MEDICINE

## 2023-07-17 PROCEDURE — 90746 HEPB VACCINE 3 DOSE ADULT IM: CPT | Performed by: FAMILY MEDICINE

## 2023-07-17 PROCEDURE — 3079F DIAST BP 80-89 MM HG: CPT | Performed by: FAMILY MEDICINE

## 2023-07-17 PROCEDURE — 99214 OFFICE O/P EST MOD 30 MIN: CPT | Performed by: FAMILY MEDICINE

## 2023-07-17 SDOH — ECONOMIC STABILITY: FOOD INSECURITY: WITHIN THE PAST 12 MONTHS, YOU WORRIED THAT YOUR FOOD WOULD RUN OUT BEFORE YOU GOT MONEY TO BUY MORE.: NEVER TRUE

## 2023-07-17 SDOH — ECONOMIC STABILITY: INCOME INSECURITY: HOW HARD IS IT FOR YOU TO PAY FOR THE VERY BASICS LIKE FOOD, HOUSING, MEDICAL CARE, AND HEATING?: NOT HARD AT ALL

## 2023-07-17 SDOH — ECONOMIC STABILITY: FOOD INSECURITY: WITHIN THE PAST 12 MONTHS, THE FOOD YOU BOUGHT JUST DIDN'T LAST AND YOU DIDN'T HAVE MONEY TO GET MORE.: NEVER TRUE

## 2023-07-17 ASSESSMENT — PATIENT HEALTH QUESTIONNAIRE - PHQ9
2. FEELING DOWN, DEPRESSED OR HOPELESS: 1
SUM OF ALL RESPONSES TO PHQ QUESTIONS 1-9: 10
7. TROUBLE CONCENTRATING ON THINGS, SUCH AS READING THE NEWSPAPER OR WATCHING TELEVISION: 1
SUM OF ALL RESPONSES TO PHQ QUESTIONS 1-9: 10
4. FEELING TIRED OR HAVING LITTLE ENERGY: 2
10. IF YOU CHECKED OFF ANY PROBLEMS, HOW DIFFICULT HAVE THESE PROBLEMS MADE IT FOR YOU TO DO YOUR WORK, TAKE CARE OF THINGS AT HOME, OR GET ALONG WITH OTHER PEOPLE: 1
3. TROUBLE FALLING OR STAYING ASLEEP: 3
SUM OF ALL RESPONSES TO PHQ9 QUESTIONS 1 & 2: 2
8. MOVING OR SPEAKING SO SLOWLY THAT OTHER PEOPLE COULD HAVE NOTICED. OR THE OPPOSITE, BEING SO FIGETY OR RESTLESS THAT YOU HAVE BEEN MOVING AROUND A LOT MORE THAN USUAL: 0
1. LITTLE INTEREST OR PLEASURE IN DOING THINGS: 1
6. FEELING BAD ABOUT YOURSELF - OR THAT YOU ARE A FAILURE OR HAVE LET YOURSELF OR YOUR FAMILY DOWN: 2
SUM OF ALL RESPONSES TO PHQ QUESTIONS 1-9: 10
9. THOUGHTS THAT YOU WOULD BE BETTER OFF DEAD, OR OF HURTING YOURSELF: 0
SUM OF ALL RESPONSES TO PHQ QUESTIONS 1-9: 10

## 2023-07-17 ASSESSMENT — ENCOUNTER SYMPTOMS
VOMITING: 0
ABDOMINAL PAIN: 0
DIARRHEA: 0
SORE THROAT: 0
NAUSEA: 0
COUGH: 0
CONSTIPATION: 0

## 2023-07-17 NOTE — PATIENT INSTRUCTIONS
Talk with [address]anna PCP regarding increasing your sertraline    Call weight loss clinic to schedule appointment

## 2023-07-17 NOTE — PROGRESS NOTES
Jon Linton presents today for   Chief Complaint   Patient presents with    Hypertension    Diabetes    Chronic Kidney Disease       Is someone accompanying this pt? Yes    Is the patient using any DME equipment during OV? Yes    Depression Screening:  PHQ-9  5/24/2023   Little interest or pleasure in doing things 0   Little interest or pleasure in doing things -   Feeling down, depressed, or hopeless 0   Trouble falling or staying asleep, or sleeping too much 0   Feeling tired or having little energy 0   Poor appetite or overeating 0   Feeling bad about yourself - or that you are a failure or have let yourself or your family down 0   Trouble concentrating on things, such as reading the newspaper or watching television 0   Moving or speaking so slowly that other people could have noticed. Or the opposite - being so fidgety or restless that you have been moving around a lot more than usual 0   Thoughts that you would be better off dead, or of hurting yourself in some way 0   PHQ-2 Score 0   Total Score PHQ 2 -   PHQ-9 Total Score 0   If you checked off any problems, how difficult have these problems made it for you to do your work, take care of things at home, or get along with other people? 0               Health Maintenance reviewed and discussed and ordered per Provider. Health Maintenance Due   Topic Date Due    Diabetic retinal exam  03/03/2015    Diabetic foot exam  06/12/2018    COVID-19 Vaccine (5 - Booster for Moderna series) 06/03/2022   .        1. \"Have you been to the ER, urgent care clinic since your last visit? Hospitalized since your last visit? \" No    2. \"Have you seen or consulted any other health care providers outside of the [address] [place] [address] since your last visit? \" No    3. For patients over 45: Has the patient had a colonoscopy? Yes - no Care Gap present     If the patient is female:    4. For patients over 40: Has the patient had a mammogram? No    5. For patients over 21:  Has

## 2023-08-17 ENCOUNTER — OFFICE VISIT (OUTPATIENT)
Facility: CLINIC | Age: 78
End: 2023-08-17
Payer: MEDICARE

## 2023-08-17 DIAGNOSIS — R74.01 ELEVATED ALT MEASUREMENT: Primary | ICD-10-CM

## 2023-08-17 PROCEDURE — G0010 ADMIN HEPATITIS B VACCINE: HCPCS | Performed by: FAMILY MEDICINE

## 2023-08-17 PROCEDURE — 90746 HEPB VACCINE 3 DOSE ADULT IM: CPT | Performed by: FAMILY MEDICINE

## 2023-11-17 ENCOUNTER — OFFICE VISIT (OUTPATIENT)
Facility: CLINIC | Age: 78
End: 2023-11-17
Payer: MEDICARE

## 2023-11-17 VITALS
HEIGHT: 64 IN | TEMPERATURE: 97.9 F | RESPIRATION RATE: 20 BRPM | OXYGEN SATURATION: 97 % | WEIGHT: 186 LBS | DIASTOLIC BLOOD PRESSURE: 75 MMHG | BODY MASS INDEX: 31.76 KG/M2 | SYSTOLIC BLOOD PRESSURE: 123 MMHG | HEART RATE: 81 BPM

## 2023-11-17 DIAGNOSIS — E78.2 MIXED HYPERLIPIDEMIA: ICD-10-CM

## 2023-11-17 DIAGNOSIS — F33.1 MODERATE EPISODE OF RECURRENT MAJOR DEPRESSIVE DISORDER (HCC): ICD-10-CM

## 2023-11-17 DIAGNOSIS — E11.22 CONTROLLED TYPE 2 DIABETES MELLITUS WITH STAGE 3 CHRONIC KIDNEY DISEASE, WITHOUT LONG-TERM CURRENT USE OF INSULIN (HCC): Primary | ICD-10-CM

## 2023-11-17 DIAGNOSIS — Z12.5 PROSTATE CANCER SCREENING: ICD-10-CM

## 2023-11-17 DIAGNOSIS — N18.30 CONTROLLED TYPE 2 DIABETES MELLITUS WITH STAGE 3 CHRONIC KIDNEY DISEASE, WITHOUT LONG-TERM CURRENT USE OF INSULIN (HCC): Primary | ICD-10-CM

## 2023-11-17 DIAGNOSIS — R06.09 DOE (DYSPNEA ON EXERTION): ICD-10-CM

## 2023-11-17 PROCEDURE — 3074F SYST BP LT 130 MM HG: CPT | Performed by: FAMILY MEDICINE

## 2023-11-17 PROCEDURE — 99214 OFFICE O/P EST MOD 30 MIN: CPT | Performed by: FAMILY MEDICINE

## 2023-11-17 PROCEDURE — 3051F HG A1C>EQUAL 7.0%<8.0%: CPT | Performed by: FAMILY MEDICINE

## 2023-11-17 PROCEDURE — 1123F ACP DISCUSS/DSCN MKR DOCD: CPT | Performed by: FAMILY MEDICINE

## 2023-11-17 PROCEDURE — 3078F DIAST BP <80 MM HG: CPT | Performed by: FAMILY MEDICINE

## 2023-11-17 RX ORDER — SERTRALINE HYDROCHLORIDE 100 MG/1
100 TABLET, FILM COATED ORAL DAILY
Qty: 30 TABLET | Refills: 2 | Status: SHIPPED | OUTPATIENT
Start: 2023-11-17

## 2023-11-17 RX ORDER — LANOLIN ALCOHOL/MO/W.PET/CERES
1000 CREAM (GRAM) TOPICAL DAILY
COMMUNITY

## 2023-11-17 RX ORDER — EZETIMIBE 10 MG/1
10 TABLET ORAL DAILY
COMMUNITY

## 2023-11-17 RX ORDER — ASPIRIN 81 MG/1
81 TABLET ORAL DAILY
COMMUNITY

## 2023-11-17 RX ORDER — DORZOLAMIDE HCL 20 MG/ML
2 SOLUTION/ DROPS OPHTHALMIC DAILY
COMMUNITY

## 2023-11-17 SDOH — ECONOMIC STABILITY: FOOD INSECURITY: WITHIN THE PAST 12 MONTHS, YOU WORRIED THAT YOUR FOOD WOULD RUN OUT BEFORE YOU GOT MONEY TO BUY MORE.: NEVER TRUE

## 2023-11-17 SDOH — ECONOMIC STABILITY: INCOME INSECURITY: HOW HARD IS IT FOR YOU TO PAY FOR THE VERY BASICS LIKE FOOD, HOUSING, MEDICAL CARE, AND HEATING?: NOT HARD AT ALL

## 2023-11-17 SDOH — ECONOMIC STABILITY: FOOD INSECURITY: WITHIN THE PAST 12 MONTHS, THE FOOD YOU BOUGHT JUST DIDN'T LAST AND YOU DIDN'T HAVE MONEY TO GET MORE.: NEVER TRUE

## 2023-11-17 ASSESSMENT — PATIENT HEALTH QUESTIONNAIRE - PHQ9
SUM OF ALL RESPONSES TO PHQ9 QUESTIONS 1 & 2: 0
7. TROUBLE CONCENTRATING ON THINGS, SUCH AS READING THE NEWSPAPER OR WATCHING TELEVISION: 1
6. FEELING BAD ABOUT YOURSELF - OR THAT YOU ARE A FAILURE OR HAVE LET YOURSELF OR YOUR FAMILY DOWN: 0
SUM OF ALL RESPONSES TO PHQ QUESTIONS 1-9: 12
8. MOVING OR SPEAKING SO SLOWLY THAT OTHER PEOPLE COULD HAVE NOTICED. OR THE OPPOSITE, BEING SO FIGETY OR RESTLESS THAT YOU HAVE BEEN MOVING AROUND A LOT MORE THAN USUAL: 0
3. TROUBLE FALLING OR STAYING ASLEEP: 0
SUM OF ALL RESPONSES TO PHQ QUESTIONS 1-9: 12
6. FEELING BAD ABOUT YOURSELF - OR THAT YOU ARE A FAILURE OR HAVE LET YOURSELF OR YOUR FAMILY DOWN: 2
SUM OF ALL RESPONSES TO PHQ QUESTIONS 1-9: 12
SUM OF ALL RESPONSES TO PHQ QUESTIONS 1-9: 12
5. POOR APPETITE OR OVEREATING: 0
10. IF YOU CHECKED OFF ANY PROBLEMS, HOW DIFFICULT HAVE THESE PROBLEMS MADE IT FOR YOU TO DO YOUR WORK, TAKE CARE OF THINGS AT HOME, OR GET ALONG WITH OTHER PEOPLE: 0
1. LITTLE INTEREST OR PLEASURE IN DOING THINGS: 3
SUM OF ALL RESPONSES TO PHQ QUESTIONS 1-9: 0
7. TROUBLE CONCENTRATING ON THINGS, SUCH AS READING THE NEWSPAPER OR WATCHING TELEVISION: 0
10. IF YOU CHECKED OFF ANY PROBLEMS, HOW DIFFICULT HAVE THESE PROBLEMS MADE IT FOR YOU TO DO YOUR WORK, TAKE CARE OF THINGS AT HOME, OR GET ALONG WITH OTHER PEOPLE: 1
8. MOVING OR SPEAKING SO SLOWLY THAT OTHER PEOPLE COULD HAVE NOTICED. OR THE OPPOSITE, BEING SO FIGETY OR RESTLESS THAT YOU HAVE BEEN MOVING AROUND A LOT MORE THAN USUAL: 0
3. TROUBLE FALLING OR STAYING ASLEEP: 0
SUM OF ALL RESPONSES TO PHQ9 QUESTIONS 1 & 2: 4
SUM OF ALL RESPONSES TO PHQ QUESTIONS 1-9: 0
1. LITTLE INTEREST OR PLEASURE IN DOING THINGS: 0
5. POOR APPETITE OR OVEREATING: 2
2. FEELING DOWN, DEPRESSED OR HOPELESS: 0
9. THOUGHTS THAT YOU WOULD BE BETTER OFF DEAD, OR OF HURTING YOURSELF: 0
2. FEELING DOWN, DEPRESSED OR HOPELESS: 1
4. FEELING TIRED OR HAVING LITTLE ENERGY: 3
4. FEELING TIRED OR HAVING LITTLE ENERGY: 0

## 2023-11-17 ASSESSMENT — COLUMBIA-SUICIDE SEVERITY RATING SCALE - C-SSRS
5. HAVE YOU STARTED TO WORK OUT OR WORKED OUT THE DETAILS OF HOW TO KILL YOURSELF? DO YOU INTEND TO CARRY OUT THIS PLAN?: NO
4. HAVE YOU HAD THESE THOUGHTS AND HAD SOME INTENTION OF ACTING ON THEM?: NO
7. DID THIS OCCUR IN THE LAST THREE MONTHS: NO
3. HAVE YOU BEEN THINKING ABOUT HOW YOU MIGHT KILL YOURSELF?: NO

## 2023-11-17 ASSESSMENT — ANXIETY QUESTIONNAIRES
3. WORRYING TOO MUCH ABOUT DIFFERENT THINGS: 0
5. BEING SO RESTLESS THAT IT IS HARD TO SIT STILL: 0
6. BECOMING EASILY ANNOYED OR IRRITABLE: 0
4. TROUBLE RELAXING: 0
GAD7 TOTAL SCORE: 0
2. NOT BEING ABLE TO STOP OR CONTROL WORRYING: 0
IF YOU CHECKED OFF ANY PROBLEMS ON THIS QUESTIONNAIRE, HOW DIFFICULT HAVE THESE PROBLEMS MADE IT FOR YOU TO DO YOUR WORK, TAKE CARE OF THINGS AT HOME, OR GET ALONG WITH OTHER PEOPLE: NOT DIFFICULT AT ALL
7. FEELING AFRAID AS IF SOMETHING AWFUL MIGHT HAPPEN: 0
1. FEELING NERVOUS, ANXIOUS, OR ON EDGE: 0

## 2023-11-17 NOTE — PROGRESS NOTES
(ACWY) vaccine  Aged Out    Diabetic Alb to Cr ratio (uACR) test  Discontinued    Depression Screen  Discontinued        Subjective     69 y/o male here for follow up    Depression: encouraged to discuss [facility]; states spoke with nurse practitioner regarding other things but didn't address mood; seeing counselor every 2 weeks but missed the last one and has to reschedule it    Encouraged dietary changes for weight loss; last weight 180 lbs; up to 186 lbs    Increased PRITCHARD with bending/rushing per Cardiology note; planing for stress test in 3 months but advised to increase physical activity;    11/10/2023 Labs  Normal thyroid  Normal urine protein  TC 92, HDL 42, LDL 38  A1c 7.5%  CMP: Cr 1.7, BUN 29, up from 1.3 baseline in May 2023; eGFR down from 54.9 to 40.5  Normal blood count      Labs obtained prior to visit? Yes  Reviewed with patient? yes      ROS:     Review of Systems   Constitutional:  Negative for activity change, chills and fatigue. HENT:  Negative for congestion and sore throat. Respiratory:  Positive for shortness of breath. Negative for cough. Cardiovascular:  Negative for chest pain and palpitations. Gastrointestinal:  Negative for abdominal pain, constipation, diarrhea, nausea and vomiting. Endocrine: Negative for polyuria. Genitourinary:  Negative for dysuria and hematuria. Musculoskeletal:  Negative for arthralgias. Skin:  Negative for rash. Neurological:  Negative for light-headedness. Psychiatric/Behavioral:  Positive for dysphoric mood. Negative for suicidal ideas. The problem list was updated as a part of today's visit. Patient Active Problem List   Diagnosis    Controlled type 2 diabetes mellitus with stage 3 chronic kidney disease, without long-term current use of insulin ([facility])    Obesity (BMI 30.0-34. 9)    Heartburn    Primary hypertension    Shoulder pain, right    TIO on CPAP    Hypercholesterolemia

## 2023-11-20 ASSESSMENT — ENCOUNTER SYMPTOMS
NAUSEA: 0
VOMITING: 0
DIARRHEA: 0
SHORTNESS OF BREATH: 1
ABDOMINAL PAIN: 0
CONSTIPATION: 0
SORE THROAT: 0
COUGH: 0

## 2023-12-04 ENCOUNTER — TELEPHONE (OUTPATIENT)
Facility: CLINIC | Age: 78
End: 2023-12-04

## 2023-12-04 RX ORDER — SERTRALINE HYDROCHLORIDE 100 MG/1
100 TABLET, FILM COATED ORAL DAILY
Qty: 30 TABLET | Refills: 0 | Status: SHIPPED | OUTPATIENT
Start: 2023-12-04 | End: 2023-12-05 | Stop reason: SDUPTHER

## 2023-12-04 NOTE — TELEPHONE ENCOUNTER
Patient's Rx for Sertraline 100 mg that went to the [place] [facility] is still process and the patient is now out of his medication. Please send a 30 day supply to [facility] at [facility]. Patient's wife would like to be contacted when medication has been sent.

## 2023-12-05 DIAGNOSIS — F33.1 MODERATE EPISODE OF RECURRENT MAJOR DEPRESSIVE DISORDER (HCC): ICD-10-CM

## 2023-12-05 NOTE — TELEPHONE ENCOUNTER
Phone call from patients wife requesting patients sertraline. Advised wife it was sent yesterday to [facility] and she requested [facility].

## 2023-12-06 RX ORDER — SERTRALINE HYDROCHLORIDE 100 MG/1
100 TABLET, FILM COATED ORAL DAILY
Qty: 30 TABLET | Refills: 0 | Status: SHIPPED | OUTPATIENT
Start: 2023-12-06

## 2024-01-11 ENCOUNTER — OFFICE VISIT (OUTPATIENT)
Facility: CLINIC | Age: 79
End: 2024-01-11
Payer: MEDICARE

## 2024-01-11 VITALS
SYSTOLIC BLOOD PRESSURE: 132 MMHG | HEART RATE: 69 BPM | BODY MASS INDEX: 31.41 KG/M2 | WEIGHT: 184 LBS | RESPIRATION RATE: 16 BRPM | DIASTOLIC BLOOD PRESSURE: 84 MMHG | TEMPERATURE: 97.7 F | HEIGHT: 64 IN | OXYGEN SATURATION: 96 %

## 2024-01-11 DIAGNOSIS — F33.1 MODERATE EPISODE OF RECURRENT MAJOR DEPRESSIVE DISORDER (HCC): Primary | ICD-10-CM

## 2024-01-11 DIAGNOSIS — R06.02 SHORTNESS OF BREATH: ICD-10-CM

## 2024-01-11 DIAGNOSIS — N18.30 STAGE 3 CHRONIC KIDNEY DISEASE, UNSPECIFIED WHETHER STAGE 3A OR 3B CKD (HCC): ICD-10-CM

## 2024-01-11 PROCEDURE — 99214 OFFICE O/P EST MOD 30 MIN: CPT | Performed by: FAMILY MEDICINE

## 2024-01-11 PROCEDURE — 1123F ACP DISCUSS/DSCN MKR DOCD: CPT | Performed by: FAMILY MEDICINE

## 2024-01-11 PROCEDURE — 3075F SYST BP GE 130 - 139MM HG: CPT | Performed by: FAMILY MEDICINE

## 2024-01-11 PROCEDURE — 3079F DIAST BP 80-89 MM HG: CPT | Performed by: FAMILY MEDICINE

## 2024-01-11 RX ORDER — SERTRALINE HYDROCHLORIDE 100 MG/1
100 TABLET, FILM COATED ORAL DAILY
Qty: 30 TABLET | Refills: 2 | Status: SHIPPED | OUTPATIENT
Start: 2024-01-11

## 2024-01-11 ASSESSMENT — PATIENT HEALTH QUESTIONNAIRE - PHQ9
SUM OF ALL RESPONSES TO PHQ QUESTIONS 1-9: 11
SUM OF ALL RESPONSES TO PHQ QUESTIONS 1-9: 9
6. FEELING BAD ABOUT YOURSELF - OR THAT YOU ARE A FAILURE OR HAVE LET YOURSELF OR YOUR FAMILY DOWN: 1
SUM OF ALL RESPONSES TO PHQ9 QUESTIONS 1 & 2: 0
7. TROUBLE CONCENTRATING ON THINGS, SUCH AS READING THE NEWSPAPER OR WATCHING TELEVISION: 3
2. FEELING DOWN, DEPRESSED OR HOPELESS: 0
3. TROUBLE FALLING OR STAYING ASLEEP: 3
10. IF YOU CHECKED OFF ANY PROBLEMS, HOW DIFFICULT HAVE THESE PROBLEMS MADE IT FOR YOU TO DO YOUR WORK, TAKE CARE OF THINGS AT HOME, OR GET ALONG WITH OTHER PEOPLE: 0
4. FEELING TIRED OR HAVING LITTLE ENERGY: 2
1. LITTLE INTEREST OR PLEASURE IN DOING THINGS: 0
5. POOR APPETITE OR OVEREATING: 0
9. THOUGHTS THAT YOU WOULD BE BETTER OFF DEAD, OR OF HURTING YOURSELF: 2
SUM OF ALL RESPONSES TO PHQ QUESTIONS 1-9: 11
8. MOVING OR SPEAKING SO SLOWLY THAT OTHER PEOPLE COULD HAVE NOTICED. OR THE OPPOSITE, BEING SO FIGETY OR RESTLESS THAT YOU HAVE BEEN MOVING AROUND A LOT MORE THAN USUAL: 0
SUM OF ALL RESPONSES TO PHQ QUESTIONS 1-9: 11

## 2024-01-11 ASSESSMENT — COLUMBIA-SUICIDE SEVERITY RATING SCALE - C-SSRS: 1. WITHIN THE PAST MONTH, HAVE YOU WISHED YOU WERE DEAD OR WISHED YOU COULD GO TO SLEEP AND NOT WAKE UP?: NO

## 2024-01-11 ASSESSMENT — ENCOUNTER SYMPTOMS: SHORTNESS OF BREATH: 1

## 2024-01-11 NOTE — PROGRESS NOTES
stroke    Class 1 obesity due to excess calories with serious comorbidity and body mass index (BMI) of 31.0 to 31.9 in adult    Chronic gout without tophus    Erectile dysfunction    Diabetic retinopathy associated with type 2 diabetes mellitus (HCC)    Mild episode of recurrent major depressive disorder (HCC)    Chronic left shoulder pain    Elevated ALT measurement    Stage 3a chronic kidney disease (HCC)    Atherosclerotic heart disease of native coronary artery without angina pectoris    CAD in native artery    Combined forms of age-related cataract, bilateral    Diabetic macular edema (HCC)    History of prostate cancer    S/P prostatectomy    Posttraumatic stress disorder    Type 2 diabetes mellitus with moderate nonproliferative diabetic retinopathy with macular edema, right eye (HCC)    ZUÑIGA (nonalcoholic steatohepatitis)    Fatty liver    PRITCHARD (dyspnea on exertion)       The PS,  were reviewed.     Past Surgical History:   Procedure Laterality Date    CATARACT EXTRACTION      COLONOSCOPY FLX DX W/COLLJ SPEC WHEN PFRMD  4-30-01    papules/ Temitope    COLONOSCOPY FLX DX W/COLLJ SPEC WHEN PFRMD      normal . Dr. Linn    KNEE ARTHROSCOPY      PROSTATECTOMY      PROSTATECTOMY  11/16/2016    Robotic-assisted laparoscopic radical prostatectomy.Robotic-assisted laparoscopic bilateral pelvic lymph node dissection  .Dissection was very difficult, bladder neck repair/anastamosis complex and time for this was >50% of time ordinarily required for this procedure.    TRABECULECTOMY         Family History   Problem Relation Age of Onset    Cancer Mother         leukemia    Other Father         aspiration/passed away in hospital    Heart Disease Brother     Heart Attack Brother     Lung Cancer Brother         lung CA    Other Brother         vocal cord polyps    Diabetes Maternal Grandfather     Diabetes Paternal Grandmother           SH:  Social History     Tobacco Use    Smoking status: Never    Smokeless

## 2024-03-13 ENCOUNTER — TELEPHONE (OUTPATIENT)
Facility: CLINIC | Age: 79
End: 2024-03-13

## 2024-03-13 NOTE — TELEPHONE ENCOUNTER
Ms. Gerri Johnson with [facility] stated that the [facility].    She needs to know if Dr. Grimes will be signing off on his Home Health.  Patient only have nursing for 4 weeks.

## 2024-03-27 ENCOUNTER — OFFICE VISIT (OUTPATIENT)
Facility: CLINIC | Age: 79
End: 2024-03-27

## 2024-03-27 VITALS
DIASTOLIC BLOOD PRESSURE: 63 MMHG | WEIGHT: 184 LBS | HEIGHT: 64 IN | SYSTOLIC BLOOD PRESSURE: 111 MMHG | TEMPERATURE: 97.8 F | HEART RATE: 84 BPM | RESPIRATION RATE: 16 BRPM | OXYGEN SATURATION: 98 % | BODY MASS INDEX: 31.41 KG/M2

## 2024-03-27 DIAGNOSIS — N18.30 CONTROLLED TYPE 2 DIABETES MELLITUS WITH STAGE 3 CHRONIC KIDNEY DISEASE, WITHOUT LONG-TERM CURRENT USE OF INSULIN (HCC): ICD-10-CM

## 2024-03-27 DIAGNOSIS — R60.9 PERIPHERAL EDEMA: ICD-10-CM

## 2024-03-27 DIAGNOSIS — I10 ESSENTIAL HYPERTENSION: Primary | ICD-10-CM

## 2024-03-27 DIAGNOSIS — G47.33 OSA (OBSTRUCTIVE SLEEP APNEA): ICD-10-CM

## 2024-03-27 DIAGNOSIS — I25.42 CORONARY ARTERY DISSECTION: ICD-10-CM

## 2024-03-27 DIAGNOSIS — E11.22 CONTROLLED TYPE 2 DIABETES MELLITUS WITH STAGE 3 CHRONIC KIDNEY DISEASE, WITHOUT LONG-TERM CURRENT USE OF INSULIN (HCC): ICD-10-CM

## 2024-03-27 SDOH — ECONOMIC STABILITY: FOOD INSECURITY: WITHIN THE PAST 12 MONTHS, THE FOOD YOU BOUGHT JUST DIDN'T LAST AND YOU DIDN'T HAVE MONEY TO GET MORE.: NEVER TRUE

## 2024-03-27 SDOH — ECONOMIC STABILITY: FOOD INSECURITY: WITHIN THE PAST 12 MONTHS, YOU WORRIED THAT YOUR FOOD WOULD RUN OUT BEFORE YOU GOT MONEY TO BUY MORE.: NEVER TRUE

## 2024-03-27 SDOH — ECONOMIC STABILITY: INCOME INSECURITY: HOW HARD IS IT FOR YOU TO PAY FOR THE VERY BASICS LIKE FOOD, HOUSING, MEDICAL CARE, AND HEATING?: NOT HARD AT ALL

## 2024-03-27 ASSESSMENT — PATIENT HEALTH QUESTIONNAIRE - PHQ9
SUM OF ALL RESPONSES TO PHQ QUESTIONS 1-9: 8
3. TROUBLE FALLING OR STAYING ASLEEP: SEVERAL DAYS
10. IF YOU CHECKED OFF ANY PROBLEMS, HOW DIFFICULT HAVE THESE PROBLEMS MADE IT FOR YOU TO DO YOUR WORK, TAKE CARE OF THINGS AT HOME, OR GET ALONG WITH OTHER PEOPLE: SOMEWHAT DIFFICULT
8. MOVING OR SPEAKING SO SLOWLY THAT OTHER PEOPLE COULD HAVE NOTICED. OR THE OPPOSITE, BEING SO FIGETY OR RESTLESS THAT YOU HAVE BEEN MOVING AROUND A LOT MORE THAN USUAL: SEVERAL DAYS
SUM OF ALL RESPONSES TO PHQ QUESTIONS 1-9: 8
4. FEELING TIRED OR HAVING LITTLE ENERGY: MORE THAN HALF THE DAYS
2. FEELING DOWN, DEPRESSED OR HOPELESS: SEVERAL DAYS
1. LITTLE INTEREST OR PLEASURE IN DOING THINGS: SEVERAL DAYS
9. THOUGHTS THAT YOU WOULD BE BETTER OFF DEAD, OR OF HURTING YOURSELF: NOT AT ALL
5. POOR APPETITE OR OVEREATING: NOT AT ALL
SUM OF ALL RESPONSES TO PHQ9 QUESTIONS 1 & 2: 2
SUM OF ALL RESPONSES TO PHQ QUESTIONS 1-9: 8
6. FEELING BAD ABOUT YOURSELF - OR THAT YOU ARE A FAILURE OR HAVE LET YOURSELF OR YOUR FAMILY DOWN: SEVERAL DAYS
SUM OF ALL RESPONSES TO PHQ QUESTIONS 1-9: 8
7. TROUBLE CONCENTRATING ON THINGS, SUCH AS READING THE NEWSPAPER OR WATCHING TELEVISION: SEVERAL DAYS

## 2024-03-27 ASSESSMENT — ENCOUNTER SYMPTOMS
VOMITING: 0
SORE THROAT: 0
COUGH: 0
ABDOMINAL PAIN: 0
DIARRHEA: 0
NAUSEA: 0
CONSTIPATION: 0

## 2024-03-27 NOTE — PROGRESS NOTES
Kurt PERRI Jonathan presents today for   Chief Complaint   Patient presents with    Follow-Up from Hospital     3/4/24 [facility]. cardiomyopathy       Is someone accompanying this pt? Yes    Is the patient using any DME equipment during OV? Yes    Depression Screening:      3/27/2024    12:13 PM   PHQ-9    Little interest or pleasure in doing things 1   Feeling down, depressed, or hopeless 1   Trouble falling or staying asleep, or sleeping too much 1   Feeling tired or having little energy 2   Poor appetite or overeating 0   Feeling bad about yourself - or that you are a failure or have let yourself or your family down 1   Trouble concentrating on things, such as reading the newspaper or watching television 1   Moving or speaking so slowly that other people could have noticed. Or the opposite - being so fidgety or restless that you have been moving around a lot more than usual 1   Thoughts that you would be better off dead, or of hurting yourself in some way 0   PHQ-2 Score 2   PHQ-9 Total Score 8   If you checked off any problems, how difficult have these problems made it for you to do your work, take care of things at home, or get along with other people? 1               Health Maintenance reviewed and discussed and ordered per Provider.    Health Maintenance Due   Topic Date Due    Respiratory Syncytial Virus (RSV) Pregnant or age 60 yrs+ (1 - 1-dose 60+ series) Never done    Diabetic retinal exam  03/03/2015    Diabetic foot exam  06/12/2018    Annual Wellness Visit (Medicare Advantage)  01/01/2024    DTaP/Tdap/Td vaccine (2 - Td or Tdap) 01/14/2024    Hepatitis B vaccine (3 of 3 - 19+ 3-dose series) 01/17/2024   .        1. \"Have you been to the ER, urgent care clinic since your last visit?  Hospitalized since your last visit?\" No    2. \"Have you seen or consulted any other health care providers outside of the [facility] since your last visit?\" No    3. For patients over 45: Has the patient 
ALT measurement    Stage 3a chronic kidney disease (HCC)    Atherosclerotic heart disease of native coronary artery without angina pectoris    CAD in native artery    Combined forms of age-related cataract, bilateral    Diabetic macular edema (HCC)    History of prostate cancer    S/P prostatectomy    Posttraumatic stress disorder    Type 2 diabetes mellitus with moderate nonproliferative diabetic retinopathy with macular edema, right eye (HCC)    ZUÑIGA (nonalcoholic steatohepatitis)    Fatty liver    PRITCHARD (dyspnea on exertion)       The PSH,  were reviewed.     Past Surgical History:   Procedure Laterality Date    CATARACT EXTRACTION      COLONOSCOPY FLX DX W/COLLJ SPEC WHEN PFRMD  4-30-01    papules/ Temitope    COLONOSCOPY FLX DX W/COLLJ SPEC WHEN PFRMD      normal . Dr. Linn    KNEE ARTHROSCOPY      PROSTATECTOMY      PROSTATECTOMY  11/16/2016    Robotic-assisted laparoscopic radical prostatectomy.Robotic-assisted laparoscopic bilateral pelvic lymph node dissection  .Dissection was very difficult, bladder neck repair/anastamosis complex and time for this was >50% of time ordinarily required for this procedure.    TRABECULECTOMY         Family History   Problem Relation Age of Onset    Cancer Mother         leukemia    Other Father         aspiration/passed away in hospital    Heart Disease Brother     Heart Attack Brother     Lung Cancer Brother         lung CA    Other Brother         vocal cord polyps    Diabetes Maternal Grandfather     Diabetes Paternal Grandmother           SH:  Social History     Tobacco Use    Smoking status: Never    Smokeless tobacco: Never   Vaping Use    Vaping Use: Never used   Substance Use Topics    Alcohol use: Yes     Alcohol/week: 0.8 - 1.7 standard drinks of alcohol     Types: 1 - 2 Standard drinks or equivalent per week     Comment: 1-2 drinks/week    Drug use: Yes     Types: Marijuana (Weed)     Comment: last used 2-3 months       Medications/Allergies:  Current

## 2024-04-12 ENCOUNTER — TELEPHONE (OUTPATIENT)
Facility: CLINIC | Age: 79
End: 2024-04-12

## 2024-04-12 NOTE — TELEPHONE ENCOUNTER
Dyan from [facility] says home health provider was supposed to discharge him this week, but he declined the visit (Dyan is notifying PCP about a missed visit due to another doctor's appt) so [facility] is set to discharge him next week.

## 2024-06-05 ENCOUNTER — OFFICE VISIT (OUTPATIENT)
Facility: CLINIC | Age: 79
End: 2024-06-05

## 2024-06-05 VITALS
WEIGHT: 181 LBS | HEIGHT: 64 IN | DIASTOLIC BLOOD PRESSURE: 82 MMHG | RESPIRATION RATE: 16 BRPM | BODY MASS INDEX: 30.9 KG/M2 | OXYGEN SATURATION: 97 % | TEMPERATURE: 98 F | SYSTOLIC BLOOD PRESSURE: 132 MMHG | HEART RATE: 77 BPM

## 2024-06-05 DIAGNOSIS — N18.31 STAGE 3A CHRONIC KIDNEY DISEASE (HCC): ICD-10-CM

## 2024-06-05 DIAGNOSIS — E11.69 HYPERLIPIDEMIA ASSOCIATED WITH TYPE 2 DIABETES MELLITUS (HCC): ICD-10-CM

## 2024-06-05 DIAGNOSIS — E78.5 HYPERLIPIDEMIA ASSOCIATED WITH TYPE 2 DIABETES MELLITUS (HCC): ICD-10-CM

## 2024-06-05 DIAGNOSIS — E11.9 ENCOUNTER FOR DIABETIC FOOT EXAM (HCC): ICD-10-CM

## 2024-06-05 DIAGNOSIS — K75.81 NASH (NONALCOHOLIC STEATOHEPATITIS): Chronic | ICD-10-CM

## 2024-06-05 DIAGNOSIS — F33.0 MILD EPISODE OF RECURRENT MAJOR DEPRESSIVE DISORDER (HCC): ICD-10-CM

## 2024-06-05 DIAGNOSIS — I25.10 CAD IN NATIVE ARTERY: ICD-10-CM

## 2024-06-05 DIAGNOSIS — I10 PRIMARY HYPERTENSION: ICD-10-CM

## 2024-06-05 DIAGNOSIS — Z00.00 MEDICARE ANNUAL WELLNESS VISIT, SUBSEQUENT: Primary | ICD-10-CM

## 2024-06-05 ASSESSMENT — PATIENT HEALTH QUESTIONNAIRE - PHQ9
SUM OF ALL RESPONSES TO PHQ QUESTIONS 1-9: 11
4. FEELING TIRED OR HAVING LITTLE ENERGY: SEVERAL DAYS
6. FEELING BAD ABOUT YOURSELF - OR THAT YOU ARE A FAILURE OR HAVE LET YOURSELF OR YOUR FAMILY DOWN: SEVERAL DAYS
10. IF YOU CHECKED OFF ANY PROBLEMS, HOW DIFFICULT HAVE THESE PROBLEMS MADE IT FOR YOU TO DO YOUR WORK, TAKE CARE OF THINGS AT HOME, OR GET ALONG WITH OTHER PEOPLE: SOMEWHAT DIFFICULT
9. THOUGHTS THAT YOU WOULD BE BETTER OFF DEAD, OR OF HURTING YOURSELF: SEVERAL DAYS
SUM OF ALL RESPONSES TO PHQ9 QUESTIONS 1 & 2: 2
SUM OF ALL RESPONSES TO PHQ QUESTIONS 1-9: 10
5. POOR APPETITE OR OVEREATING: MORE THAN HALF THE DAYS
8. MOVING OR SPEAKING SO SLOWLY THAT OTHER PEOPLE COULD HAVE NOTICED. OR THE OPPOSITE, BEING SO FIGETY OR RESTLESS THAT YOU HAVE BEEN MOVING AROUND A LOT MORE THAN USUAL: NOT AT ALL
SUM OF ALL RESPONSES TO PHQ QUESTIONS 1-9: 11
7. TROUBLE CONCENTRATING ON THINGS, SUCH AS READING THE NEWSPAPER OR WATCHING TELEVISION: SEVERAL DAYS
1. LITTLE INTEREST OR PLEASURE IN DOING THINGS: SEVERAL DAYS
2. FEELING DOWN, DEPRESSED OR HOPELESS: SEVERAL DAYS
3. TROUBLE FALLING OR STAYING ASLEEP: NEARLY EVERY DAY
SUM OF ALL RESPONSES TO PHQ QUESTIONS 1-9: 11

## 2024-06-05 ASSESSMENT — COLUMBIA-SUICIDE SEVERITY RATING SCALE - C-SSRS
6. HAVE YOU EVER DONE ANYTHING, STARTED TO DO ANYTHING, OR PREPARED TO DO ANYTHING TO END YOUR LIFE?: NO
2. HAVE YOU ACTUALLY HAD ANY THOUGHTS OF KILLING YOURSELF?: NO
1. WITHIN THE PAST MONTH, HAVE YOU WISHED YOU WERE DEAD OR WISHED YOU COULD GO TO SLEEP AND NOT WAKE UP?: NO

## 2024-06-05 ASSESSMENT — LIFESTYLE VARIABLES
HOW MANY STANDARD DRINKS CONTAINING ALCOHOL DO YOU HAVE ON A TYPICAL DAY: 1 OR 2
HOW OFTEN DO YOU HAVE A DRINK CONTAINING ALCOHOL: 2-4 TIMES A MONTH

## 2024-06-05 ASSESSMENT — ENCOUNTER SYMPTOMS
ABDOMINAL PAIN: 0
CONSTIPATION: 0
SORE THROAT: 0
NAUSEA: 0
DIARRHEA: 0
VOMITING: 0
COUGH: 0

## 2024-08-28 LAB — HBA1C MFR BLD HPLC: 8.9 %

## 2024-09-05 ENCOUNTER — OFFICE VISIT (OUTPATIENT)
Facility: CLINIC | Age: 79
End: 2024-09-05
Payer: MEDICARE

## 2024-09-05 VITALS
HEART RATE: 77 BPM | DIASTOLIC BLOOD PRESSURE: 74 MMHG | TEMPERATURE: 97 F | RESPIRATION RATE: 16 BRPM | BODY MASS INDEX: 30.05 KG/M2 | HEIGHT: 64 IN | SYSTOLIC BLOOD PRESSURE: 130 MMHG | OXYGEN SATURATION: 92 % | WEIGHT: 176 LBS

## 2024-09-05 DIAGNOSIS — E66.09 CLASS 1 OBESITY DUE TO EXCESS CALORIES WITH SERIOUS COMORBIDITY AND BODY MASS INDEX (BMI) OF 31.0 TO 31.9 IN ADULT: ICD-10-CM

## 2024-09-05 DIAGNOSIS — K59.00 CONSTIPATION, UNSPECIFIED CONSTIPATION TYPE: ICD-10-CM

## 2024-09-05 DIAGNOSIS — F43.10 POSTTRAUMATIC STRESS DISORDER: Primary | ICD-10-CM

## 2024-09-05 DIAGNOSIS — R00.2 PALPITATIONS: ICD-10-CM

## 2024-09-05 DIAGNOSIS — E11.65 UNCONTROLLED TYPE 2 DIABETES MELLITUS WITH HYPERGLYCEMIA (HCC): ICD-10-CM

## 2024-09-05 DIAGNOSIS — K76.0 FATTY LIVER: ICD-10-CM

## 2024-09-05 DIAGNOSIS — R79.89 ELEVATED LFTS: ICD-10-CM

## 2024-09-05 PROCEDURE — 99214 OFFICE O/P EST MOD 30 MIN: CPT | Performed by: FAMILY MEDICINE

## 2024-09-05 PROCEDURE — 3078F DIAST BP <80 MM HG: CPT | Performed by: FAMILY MEDICINE

## 2024-09-05 PROCEDURE — 3075F SYST BP GE 130 - 139MM HG: CPT | Performed by: FAMILY MEDICINE

## 2024-09-05 PROCEDURE — 1123F ACP DISCUSS/DSCN MKR DOCD: CPT | Performed by: FAMILY MEDICINE

## 2024-09-05 RX ORDER — POLYETHYLENE GLYCOL 3350 17 G/17G
POWDER, FOR SOLUTION ORAL
Qty: 1530 G | Refills: 1 | Status: SHIPPED | OUTPATIENT
Start: 2024-09-05

## 2024-09-24 ENCOUNTER — TELEPHONE (OUTPATIENT)
Facility: CLINIC | Age: 79
End: 2024-09-24

## 2025-01-07 ENCOUNTER — TELEMEDICINE (OUTPATIENT)
Facility: CLINIC | Age: 80
End: 2025-01-07
Payer: MEDICARE

## 2025-01-07 ENCOUNTER — TELEPHONE (OUTPATIENT)
Facility: CLINIC | Age: 80
End: 2025-01-07

## 2025-01-07 DIAGNOSIS — E11.3311 TYPE 2 DIABETES MELLITUS WITH RIGHT EYE AFFECTED BY MODERATE NONPROLIFERATIVE RETINOPATHY AND MACULAR EDEMA, WITHOUT LONG-TERM CURRENT USE OF INSULIN (HCC): ICD-10-CM

## 2025-01-07 DIAGNOSIS — I10 PRIMARY HYPERTENSION: Primary | ICD-10-CM

## 2025-01-07 DIAGNOSIS — R06.02 SHORTNESS OF BREATH: ICD-10-CM

## 2025-01-07 DIAGNOSIS — R06.09 DOE (DYSPNEA ON EXERTION): ICD-10-CM

## 2025-01-07 DIAGNOSIS — E78.5 HYPERLIPIDEMIA ASSOCIATED WITH TYPE 2 DIABETES MELLITUS (HCC): ICD-10-CM

## 2025-01-07 DIAGNOSIS — E11.69 HYPERLIPIDEMIA ASSOCIATED WITH TYPE 2 DIABETES MELLITUS (HCC): ICD-10-CM

## 2025-01-07 DIAGNOSIS — N18.31 STAGE 3A CHRONIC KIDNEY DISEASE (HCC): ICD-10-CM

## 2025-01-07 DIAGNOSIS — F33.1 MODERATE EPISODE OF RECURRENT MAJOR DEPRESSIVE DISORDER (HCC): ICD-10-CM

## 2025-01-07 DIAGNOSIS — C61 PROSTATE CANCER (HCC): ICD-10-CM

## 2025-01-07 PROCEDURE — 1159F MED LIST DOCD IN RCRD: CPT | Performed by: FAMILY MEDICINE

## 2025-01-07 PROCEDURE — 99214 OFFICE O/P EST MOD 30 MIN: CPT | Performed by: FAMILY MEDICINE

## 2025-01-07 PROCEDURE — 1160F RVW MEDS BY RX/DR IN RCRD: CPT | Performed by: FAMILY MEDICINE

## 2025-01-07 PROCEDURE — 1123F ACP DISCUSS/DSCN MKR DOCD: CPT | Performed by: FAMILY MEDICINE

## 2025-01-07 SDOH — ECONOMIC STABILITY: FOOD INSECURITY: WITHIN THE PAST 12 MONTHS, THE FOOD YOU BOUGHT JUST DIDN'T LAST AND YOU DIDN'T HAVE MONEY TO GET MORE.: NEVER TRUE

## 2025-01-07 SDOH — ECONOMIC STABILITY: FOOD INSECURITY: WITHIN THE PAST 12 MONTHS, YOU WORRIED THAT YOUR FOOD WOULD RUN OUT BEFORE YOU GOT MONEY TO BUY MORE.: NEVER TRUE

## 2025-01-07 SDOH — ECONOMIC STABILITY: INCOME INSECURITY: HOW HARD IS IT FOR YOU TO PAY FOR THE VERY BASICS LIKE FOOD, HOUSING, MEDICAL CARE, AND HEATING?: NOT VERY HARD

## 2025-01-07 ASSESSMENT — PATIENT HEALTH QUESTIONNAIRE - PHQ9
6. FEELING BAD ABOUT YOURSELF - OR THAT YOU ARE A FAILURE OR HAVE LET YOURSELF OR YOUR FAMILY DOWN: NOT AT ALL
2. FEELING DOWN, DEPRESSED OR HOPELESS: NOT AT ALL
7. TROUBLE CONCENTRATING ON THINGS, SUCH AS READING THE NEWSPAPER OR WATCHING TELEVISION: NOT AT ALL
3. TROUBLE FALLING OR STAYING ASLEEP: NOT AT ALL
SUM OF ALL RESPONSES TO PHQ QUESTIONS 1-9: 1
5. POOR APPETITE OR OVEREATING: NOT AT ALL
SUM OF ALL RESPONSES TO PHQ QUESTIONS 1-9: 1
SUM OF ALL RESPONSES TO PHQ QUESTIONS 1-9: 1
1. LITTLE INTEREST OR PLEASURE IN DOING THINGS: SEVERAL DAYS
SUM OF ALL RESPONSES TO PHQ QUESTIONS 1-9: 1
10. IF YOU CHECKED OFF ANY PROBLEMS, HOW DIFFICULT HAVE THESE PROBLEMS MADE IT FOR YOU TO DO YOUR WORK, TAKE CARE OF THINGS AT HOME, OR GET ALONG WITH OTHER PEOPLE: NOT DIFFICULT AT ALL
SUM OF ALL RESPONSES TO PHQ9 QUESTIONS 1 & 2: 1
8. MOVING OR SPEAKING SO SLOWLY THAT OTHER PEOPLE COULD HAVE NOTICED. OR THE OPPOSITE, BEING SO FIGETY OR RESTLESS THAT YOU HAVE BEEN MOVING AROUND A LOT MORE THAN USUAL: NOT AT ALL
4. FEELING TIRED OR HAVING LITTLE ENERGY: NOT AT ALL
9. THOUGHTS THAT YOU WOULD BE BETTER OFF DEAD, OR OF HURTING YOURSELF: NOT AT ALL

## 2025-01-07 ASSESSMENT — ENCOUNTER SYMPTOMS: SHORTNESS OF BREATH: 1

## 2025-01-07 NOTE — PROGRESS NOTES
Kurt Castillo had concerns including Follow-up. for today's visit .     1. \"Have you been to the ER, urgent care clinic since your last visit?  Hospitalized since your last visit?\" .NO    2. \"Have you seen or consulted any other health care providers outside of the [facility] since your last visit?\" No    3. For patients aged 45-75: Has the patient had a colonoscopy / FIT/ Cologuard? N/A      If the patient is female:    4. For patients aged 40-74: Has the patient had a mammogram within the past 2 years? N/A      5. For patients aged 21-65: Has the patient had a pap smear? {Cancer Care Gap present? N/A              1/7/2025    11:21 AM   PHQ-9    Little interest or pleasure in doing things 1   Feeling down, depressed, or hopeless 0   Trouble falling or staying asleep, or sleeping too much 0   Feeling tired or having little energy 0   Poor appetite or overeating 0   Feeling bad about yourself - or that you are a failure or have let yourself or your family down 0   Trouble concentrating on things, such as reading the newspaper or watching television 0   Moving or speaking so slowly that other people could have noticed. Or the opposite - being so fidgety or restless that you have been moving around a lot more than usual 0   Thoughts that you would be better off dead, or of hurting yourself in some way 0   PHQ-2 Score 1   PHQ-9 Total Score 1   If you checked off any problems, how difficult have these problems made it for you to do your work, take care of things at home, or get along with other people? 0          Health Maintenance Due   Topic Date Due    Diabetic retinal exam  03/03/2015    Diabetic foot exam  06/12/2018    Hepatitis B vaccine (3 of 3 - 19+ 3-dose series) 01/17/2024    Diabetic Alb to Cr ratio (uACR) test  11/10/2024    Lipids  11/10/2024    GFR test (Diabetes, CKD 3-4, OR last GFR 15-59)  11/10/2024    Prostate Specific Antigen (PSA) Screening or Monitoring  12/13/2024             
Cancer Mother         leukemia    Other Father         aspiration/passed away in hospital    Heart Disease Brother     Heart Attack Brother     Lung Cancer Brother         lung CA    Other Brother         vocal cord polyps    Diabetes Maternal Grandfather     Diabetes Paternal Grandmother           SH:  Social History     Tobacco Use    Smoking status: Never    Smokeless tobacco: Never   Vaping Use    Vaping status: Never Used   Substance Use Topics    Alcohol use: Yes     Alcohol/week: 0.8 - 1.7 standard drinks of alcohol     Types: 1 - 2 Standard drinks or equivalent per week     Comment: 1-2 drinks/week    Drug use: Yes     Types: Marijuana (Weed)     Comment: last used 2-3 months       Medications/Allergies:  Current Outpatient Medications   Medication Sig Dispense Refill    Semaglutide,0.25 or 0.5MG/DOS, 2 MG/3ML SOPN Inject into the skin      Semaglutide-Weight Management (WEGOVY) 0.5 MG/0.5ML SOAJ SC injection Inject 0.5 mg into the skin every 7 days      metFORMIN (GLUCOPHAGE) 500 MG tablet Take 2 tablets by mouth daily (with breakfast)      polyethylene glycol (GLYCOLAX) 17 GM/SCOOP powder Mix 17 gm in 8 oz of liquid every 8 hours until large BM then use daily prn constipation 1530 g 1    Vitamin D3 125 MCG (5000 UT) TABS tablet Take 1 tablet by mouth daily      sertraline (ZOLOFT) 100 MG tablet Take 1 tablet by mouth daily 30 tablet 2    ezetimibe (ZETIA) 10 MG tablet Take 1 tablet by mouth daily      aspirin 81 MG EC tablet Take 1 tablet by mouth daily      Coenzyme Q10 (COQ10 PO) Take by mouth      vitamin B-12 (CYANOCOBALAMIN) 1000 MCG tablet Take 1 tablet by mouth daily      dorzolamide (TRUSOPT) 2 % ophthalmic solution 2 drops daily      Carboxymeth-Glycerin-Polysorb (REFRESH DIGITAL OP) Apply to eye      hydroCHLOROthiazide (HYDRODIURIL) 25 MG tablet Take 1 tablet by mouth daily 90 tablet 2    losartan (COZAAR) 100 MG tablet Take 1 tablet by mouth daily 90 tablet 2    ketorolac (ACULAR) 0.5 %

## 2025-01-15 ENCOUNTER — HOSPITAL ENCOUNTER (OUTPATIENT)
Facility: HOSPITAL | Age: 80
Discharge: HOME | End: 2025-01-18
Payer: MEDICARE

## 2025-01-15 VITALS — BODY MASS INDEX: 28.73 KG/M2 | WEIGHT: 167.4 LBS

## 2025-01-15 DIAGNOSIS — R06.02 SHORTNESS OF BREATH: ICD-10-CM

## 2025-01-15 DIAGNOSIS — R06.09 DOE (DYSPNEA ON EXERTION): ICD-10-CM

## 2025-01-15 PROCEDURE — 94060 EVALUATION OF WHEEZING: CPT

## 2025-01-15 PROCEDURE — 94729 DIFFUSING CAPACITY: CPT

## 2025-01-15 PROCEDURE — 94726 PLETHYSMOGRAPHY LUNG VOLUMES: CPT

## 2025-02-17 DIAGNOSIS — R06.09 DOE (DYSPNEA ON EXERTION): ICD-10-CM

## 2025-02-17 DIAGNOSIS — J98.4 RESTRICTIVE LUNG DISEASE: Primary | ICD-10-CM

## 2025-03-06 ENCOUNTER — HOSPITAL ENCOUNTER (OUTPATIENT)
Facility: HOSPITAL | Age: 80
Discharge: HOME | End: 2025-03-08
Attending: FAMILY MEDICINE
Payer: MEDICARE

## 2025-03-06 VITALS
SYSTOLIC BLOOD PRESSURE: 130 MMHG | WEIGHT: 167 LBS | DIASTOLIC BLOOD PRESSURE: 74 MMHG | HEIGHT: 64 IN | BODY MASS INDEX: 28.51 KG/M2

## 2025-03-06 DIAGNOSIS — R06.02 SHORTNESS OF BREATH: ICD-10-CM

## 2025-03-06 LAB
ECHO AO ASC DIAM: 3.1 CM
ECHO AO ASCENDING AORTA INDEX: 1.71 CM/M2
ECHO AO ROOT DIAM: 3.1 CM
ECHO AO ROOT INDEX: 1.71 CM/M2
ECHO AV AREA PEAK VELOCITY: 2.4 CM2
ECHO AV AREA VTI: 2.4 CM2
ECHO AV AREA/BSA PEAK VELOCITY: 1.3 CM2/M2
ECHO AV AREA/BSA VTI: 1.3 CM2/M2
ECHO AV MEAN GRADIENT: 3 MMHG
ECHO AV MEAN VELOCITY: 0.9 M/S
ECHO AV PEAK GRADIENT: 5 MMHG
ECHO AV PEAK VELOCITY: 1.2 M/S
ECHO AV VELOCITY RATIO: 0.83
ECHO AV VTI: 18.9 CM
ECHO BSA: 1.85 M2
ECHO EST RA PRESSURE: 3 MMHG
ECHO LA VOL A-L A2C: 31 ML (ref 18–58)
ECHO LA VOL A-L A4C: 35 ML (ref 18–58)
ECHO LA VOL BP: 30 ML (ref 18–58)
ECHO LA VOL MOD A2C: 27 ML (ref 18–58)
ECHO LA VOL MOD A4C: 31 ML (ref 18–58)
ECHO LA VOL/BSA BIPLANE: 17 ML/M2 (ref 16–34)
ECHO LA VOLUME AREA LENGTH: 35 ML
ECHO LA VOLUME INDEX A-L A2C: 17 ML/M2 (ref 16–34)
ECHO LA VOLUME INDEX A-L A4C: 19 ML/M2 (ref 16–34)
ECHO LA VOLUME INDEX AREA LENGTH: 19 ML/M2 (ref 16–34)
ECHO LA VOLUME INDEX MOD A2C: 15 ML/M2 (ref 16–34)
ECHO LA VOLUME INDEX MOD A4C: 17 ML/M2 (ref 16–34)
ECHO LV E' LATERAL VELOCITY: 3.83 CM/S
ECHO LV E' SEPTAL VELOCITY: 4.75 CM/S
ECHO LV EDV A2C: 23 ML
ECHO LV EDV A4C: 43 ML
ECHO LV EDV BP: 33 ML (ref 67–155)
ECHO LV EDV INDEX A4C: 24 ML/M2
ECHO LV EDV INDEX BP: 18 ML/M2
ECHO LV EDV NDEX A2C: 13 ML/M2
ECHO LV EF PHYSICIAN: 65 %
ECHO LV EJECTION FRACTION A2C: 76 %
ECHO LV EJECTION FRACTION A4C: 53 %
ECHO LV EJECTION FRACTION BIPLANE: 65 % (ref 55–100)
ECHO LV ESV A2C: 6 ML
ECHO LV ESV A4C: 20 ML
ECHO LV ESV BP: 12 ML (ref 22–58)
ECHO LV ESV INDEX A2C: 3 ML/M2
ECHO LV ESV INDEX A4C: 11 ML/M2
ECHO LV ESV INDEX BP: 7 ML/M2
ECHO LV FRACTIONAL SHORTENING: 34 % (ref 28–44)
ECHO LV INTERNAL DIMENSION DIASTOLE INDEX: 1.93 CM/M2
ECHO LV INTERNAL DIMENSION DIASTOLIC: 3.5 CM (ref 4.2–5.9)
ECHO LV INTERNAL DIMENSION SYSTOLIC INDEX: 1.27 CM/M2
ECHO LV INTERNAL DIMENSION SYSTOLIC: 2.3 CM
ECHO LV IVSD: 1.1 CM (ref 0.6–1)
ECHO LV MASS 2D: 88.8 G (ref 88–224)
ECHO LV MASS INDEX 2D: 49.1 G/M2 (ref 49–115)
ECHO LV POSTERIOR WALL DIASTOLIC: 0.7 CM (ref 0.6–1)
ECHO LV RELATIVE WALL THICKNESS RATIO: 0.4
ECHO LVOT AREA: 2.8 CM2
ECHO LVOT AV VTI INDEX: 0.89
ECHO LVOT DIAM: 1.9 CM
ECHO LVOT MEAN GRADIENT: 2 MMHG
ECHO LVOT PEAK GRADIENT: 4 MMHG
ECHO LVOT PEAK VELOCITY: 1 M/S
ECHO LVOT STROKE VOLUME INDEX: 26.5 ML/M2
ECHO LVOT SV: 47.9 ML
ECHO LVOT VTI: 16.9 CM
ECHO MV A VELOCITY: 1.06 M/S
ECHO MV E DECELERATION TIME (DT): 153.1 MS
ECHO MV E VELOCITY: 0.47 M/S
ECHO MV E/A RATIO: 0.44
ECHO MV E/E' LATERAL: 12.27
ECHO MV E/E' RATIO (AVERAGED): 11.08
ECHO MV E/E' SEPTAL: 9.89
ECHO RIGHT VENTRICULAR SYSTOLIC PRESSURE (RVSP): 29 MMHG
ECHO RV FREE WALL PEAK S': 9.7 CM/S
ECHO RV TAPSE: 2.1 CM (ref 1.7–?)
ECHO TV REGURGITANT MAX VELOCITY: 2.53 M/S
ECHO TV REGURGITANT PEAK GRADIENT: 26 MMHG

## 2025-03-06 PROCEDURE — 93306 TTE W/DOPPLER COMPLETE: CPT

## 2025-03-25 ENCOUNTER — OFFICE VISIT (OUTPATIENT)
Facility: CLINIC | Age: 80
End: 2025-03-25
Payer: MEDICARE

## 2025-03-25 VITALS
HEART RATE: 87 BPM | RESPIRATION RATE: 16 BRPM | DIASTOLIC BLOOD PRESSURE: 65 MMHG | SYSTOLIC BLOOD PRESSURE: 114 MMHG | WEIGHT: 168 LBS | BODY MASS INDEX: 28.68 KG/M2 | OXYGEN SATURATION: 96 % | HEIGHT: 64 IN | TEMPERATURE: 97 F

## 2025-03-25 DIAGNOSIS — F43.12 CHRONIC POST-TRAUMATIC STRESS DISORDER (PTSD): ICD-10-CM

## 2025-03-25 DIAGNOSIS — I10 ESSENTIAL HYPERTENSION: Primary | ICD-10-CM

## 2025-03-25 DIAGNOSIS — E11.69 HYPERLIPIDEMIA ASSOCIATED WITH TYPE 2 DIABETES MELLITUS: ICD-10-CM

## 2025-03-25 DIAGNOSIS — R79.89 ELEVATED LFTS: ICD-10-CM

## 2025-03-25 DIAGNOSIS — J98.4 RESTRICTIVE LUNG DISEASE: ICD-10-CM

## 2025-03-25 DIAGNOSIS — E78.5 HYPERLIPIDEMIA ASSOCIATED WITH TYPE 2 DIABETES MELLITUS: ICD-10-CM

## 2025-03-25 DIAGNOSIS — N18.2 CONTROLLED TYPE 2 DIABETES MELLITUS WITH STAGE 2 CHRONIC KIDNEY DISEASE, WITHOUT LONG-TERM CURRENT USE OF INSULIN (HCC): ICD-10-CM

## 2025-03-25 DIAGNOSIS — I25.10 CAD IN NATIVE ARTERY: ICD-10-CM

## 2025-03-25 DIAGNOSIS — N18.2 STAGE 2 CHRONIC KIDNEY DISEASE DUE TO TYPE 2 DIABETES MELLITUS (HCC): ICD-10-CM

## 2025-03-25 DIAGNOSIS — E11.22 CONTROLLED TYPE 2 DIABETES MELLITUS WITH STAGE 2 CHRONIC KIDNEY DISEASE, WITHOUT LONG-TERM CURRENT USE OF INSULIN (HCC): ICD-10-CM

## 2025-03-25 DIAGNOSIS — K76.0 FATTY LIVER: ICD-10-CM

## 2025-03-25 DIAGNOSIS — E11.22 STAGE 2 CHRONIC KIDNEY DISEASE DUE TO TYPE 2 DIABETES MELLITUS (HCC): ICD-10-CM

## 2025-03-25 PROCEDURE — 3074F SYST BP LT 130 MM HG: CPT | Performed by: FAMILY MEDICINE

## 2025-03-25 PROCEDURE — 1123F ACP DISCUSS/DSCN MKR DOCD: CPT | Performed by: FAMILY MEDICINE

## 2025-03-25 PROCEDURE — 3078F DIAST BP <80 MM HG: CPT | Performed by: FAMILY MEDICINE

## 2025-03-25 PROCEDURE — 99214 OFFICE O/P EST MOD 30 MIN: CPT | Performed by: FAMILY MEDICINE

## 2025-03-25 SDOH — ECONOMIC STABILITY: FOOD INSECURITY: WITHIN THE PAST 12 MONTHS, THE FOOD YOU BOUGHT JUST DIDN'T LAST AND YOU DIDN'T HAVE MONEY TO GET MORE.: NEVER TRUE

## 2025-03-25 SDOH — ECONOMIC STABILITY: FOOD INSECURITY: WITHIN THE PAST 12 MONTHS, YOU WORRIED THAT YOUR FOOD WOULD RUN OUT BEFORE YOU GOT MONEY TO BUY MORE.: NEVER TRUE

## 2025-03-25 ASSESSMENT — ENCOUNTER SYMPTOMS: SHORTNESS OF BREATH: 0

## 2025-03-25 ASSESSMENT — PATIENT HEALTH QUESTIONNAIRE - PHQ9
SUM OF ALL RESPONSES TO PHQ QUESTIONS 1-9: 6
6. FEELING BAD ABOUT YOURSELF - OR THAT YOU ARE A FAILURE OR HAVE LET YOURSELF OR YOUR FAMILY DOWN: SEVERAL DAYS
8. MOVING OR SPEAKING SO SLOWLY THAT OTHER PEOPLE COULD HAVE NOTICED. OR THE OPPOSITE, BEING SO FIGETY OR RESTLESS THAT YOU HAVE BEEN MOVING AROUND A LOT MORE THAN USUAL: SEVERAL DAYS
10. IF YOU CHECKED OFF ANY PROBLEMS, HOW DIFFICULT HAVE THESE PROBLEMS MADE IT FOR YOU TO DO YOUR WORK, TAKE CARE OF THINGS AT HOME, OR GET ALONG WITH OTHER PEOPLE: SOMEWHAT DIFFICULT
1. LITTLE INTEREST OR PLEASURE IN DOING THINGS: SEVERAL DAYS
3. TROUBLE FALLING OR STAYING ASLEEP: NOT AT ALL
7. TROUBLE CONCENTRATING ON THINGS, SUCH AS READING THE NEWSPAPER OR WATCHING TELEVISION: SEVERAL DAYS
9. THOUGHTS THAT YOU WOULD BE BETTER OFF DEAD, OR OF HURTING YOURSELF: NOT AT ALL
2. FEELING DOWN, DEPRESSED OR HOPELESS: SEVERAL DAYS
5. POOR APPETITE OR OVEREATING: NOT AT ALL
SUM OF ALL RESPONSES TO PHQ QUESTIONS 1-9: 6
4. FEELING TIRED OR HAVING LITTLE ENERGY: SEVERAL DAYS

## 2025-03-25 NOTE — PROGRESS NOTES
An After Visit Summary was printed and given to the patient.    All diagnoses have been discussed with the patient and all of the patient's questions have been answered.           Ayana Grimes MD  [facility]  [address] [address]

## 2025-03-26 ENCOUNTER — RESULTS FOLLOW-UP (OUTPATIENT)
Facility: CLINIC | Age: 80
End: 2025-03-26

## 2025-03-26 LAB
A/G RATIO: 1.4 RATIO (ref 1.1–2.6)
ALBUMIN: 4.1 G/DL (ref 3.5–5)
ALP BLD-CCNC: 72 U/L (ref 40–125)
ALT SERPL-CCNC: 36 U/L (ref 5–40)
ANION GAP SERPL CALCULATED.3IONS-SCNC: 12 MMOL/L (ref 3–15)
AST SERPL-CCNC: 23 U/L (ref 10–37)
BILIRUB SERPL-MCNC: 0.2 MG/DL (ref 0.2–1.2)
BUN BLDV-MCNC: 21 MG/DL (ref 6–22)
CALCIUM SERPL-MCNC: 9.8 MG/DL (ref 8.4–10.5)
CHLORIDE BLD-SCNC: 104 MMOL/L (ref 98–110)
CO2: 29 MMOL/L (ref 20–32)
CREAT SERPL-MCNC: 1.3 MG/DL (ref 0.8–1.6)
CREATININE, URINE  MG/DL: 146 MG/DL
ESTIMATED AVERAGE GLUCOSE: 145 MG/DL (ref 91–123)
GFR, ESTIMATED: 54.7 ML/MIN/1.73 SQ.M.
GLOBULIN: 2.9 G/DL (ref 2–4)
GLUCOSE: 167 MG/DL (ref 70–99)
HBA1C MFR BLD: 6.7 % (ref 4.8–5.6)
MICROALBUMIN/CREAT 24H UR: 24.9 MG/L (ref 0.1–17)
MICROALBUMIN/CREAT UR-RTO: 17.1 (ref 0–30)
POTASSIUM SERPL-SCNC: 4.1 MMOL/L (ref 3.5–5.5)
SODIUM BLD-SCNC: 145 MMOL/L (ref 133–145)
TOTAL PROTEIN: 7 G/DL (ref 6.2–8.1)

## 2025-08-19 ENCOUNTER — OFFICE VISIT (OUTPATIENT)
Facility: CLINIC | Age: 80
End: 2025-08-19
Payer: MEDICARE

## 2025-08-19 ENCOUNTER — HOSPITAL ENCOUNTER (OUTPATIENT)
Facility: HOSPITAL | Age: 80
Setting detail: SPECIMEN
Discharge: HOME | End: 2025-08-22

## 2025-08-19 VITALS
HEART RATE: 81 BPM | RESPIRATION RATE: 18 BRPM | OXYGEN SATURATION: 95 % | HEIGHT: 64 IN | SYSTOLIC BLOOD PRESSURE: 109 MMHG | BODY MASS INDEX: 28.84 KG/M2 | DIASTOLIC BLOOD PRESSURE: 65 MMHG

## 2025-08-19 DIAGNOSIS — E11.22 CONTROLLED TYPE 2 DIABETES MELLITUS WITH STAGE 2 CHRONIC KIDNEY DISEASE, WITHOUT LONG-TERM CURRENT USE OF INSULIN (HCC): ICD-10-CM

## 2025-08-19 DIAGNOSIS — Z00.00 MEDICARE ANNUAL WELLNESS VISIT, SUBSEQUENT: Primary | ICD-10-CM

## 2025-08-19 DIAGNOSIS — I10 PRIMARY HYPERTENSION: ICD-10-CM

## 2025-08-19 DIAGNOSIS — F33.0 MILD EPISODE OF RECURRENT MAJOR DEPRESSIVE DISORDER: ICD-10-CM

## 2025-08-19 DIAGNOSIS — H91.90 HEARING LOSS, UNSPECIFIED HEARING LOSS TYPE, UNSPECIFIED LATERALITY: ICD-10-CM

## 2025-08-19 DIAGNOSIS — F43.9 STRESS: ICD-10-CM

## 2025-08-19 DIAGNOSIS — N18.2 CONTROLLED TYPE 2 DIABETES MELLITUS WITH STAGE 2 CHRONIC KIDNEY DISEASE, WITHOUT LONG-TERM CURRENT USE OF INSULIN (HCC): ICD-10-CM

## 2025-08-19 DIAGNOSIS — E11.9 ENCOUNTER FOR DIABETIC FOOT EXAM (HCC): ICD-10-CM

## 2025-08-19 LAB
BACTERIA, URINE: NEGATIVE
BASOPHILS # BLD: 1 % (ref 0–2)
BASOPHILS ABSOLUTE: 0 K/UL (ref 0–0.2)
BILIRUBIN, URINE: NEGATIVE
CLARITY, UA: CLEAR
COLOR, UA: YELLOW
EOSINOPHIL # BLD: 1 % (ref 0–6)
EOSINOPHILS ABSOLUTE: 0 K/UL (ref 0–0.5)
ESTIMATED AVERAGE GLUCOSE: 143 MG/DL (ref 91–123)
GLUCOSE URINE: ABNORMAL MG/DL
HBA1C MFR BLD: 6.6 % (ref 4.8–5.6)
HCT VFR BLD CALC: 46.9 % (ref 37.8–52.2)
HEMOGLOBIN: 14.8 G/DL (ref 12.6–17.1)
HYALINE CASTS: ABNORMAL /LPF (ref 0–2)
KETONES, URINE: NEGATIVE MG/DL
LEUKOCYTE ESTERASE, URINE: NEGATIVE
LYMPHOCYTES # BLD: 28 % (ref 20–45)
LYMPHOCYTES ABSOLUTE: 1.4 K/UL (ref 1–4.8)
MCH RBC QN AUTO: 29 PG (ref 26–34)
MCHC RBC AUTO-ENTMCNC: 32 G/DL (ref 31–36)
MCV RBC AUTO: 93 FL (ref 80–95)
MONOCYTES ABSOLUTE: 0.5 K/UL (ref 0.1–1)
MONOCYTES: 10 % (ref 3–12)
NEUTROPHILS ABSOLUTE: 3 K/UL (ref 1.8–7.7)
NEUTROPHILS SEGMENTED: 60 % (ref 40–75)
NITRITE, URINE: NEGATIVE
OCCULT BLOOD,URINE: NEGATIVE
PDW BLD-RTO: 14.5 % (ref 10–15.5)
PH, URINE: 5 PH (ref 5–8)
PLATELET # BLD: 232 K/UL (ref 140–440)
PMV BLD AUTO: 11 FL (ref 9–13)
PROTEIN, URINE: NEGATIVE MG/DL
RBC # BLD: 5.04 M/UL (ref 3.8–5.8)
RBC URINE: ABNORMAL /HPF
SPECIFIC GRAVITY UA: 1.03 (ref 1–1.03)
SQUAMOUS EPITHELIAL CELLS: ABNORMAL /HPF
UROBILINOGEN, URINE: 0.2 MG/DL
WBC # BLD: 5 K/UL (ref 4–11)
WBC URINE: ABNORMAL /HPF (ref 0–2)

## 2025-08-19 PROCEDURE — 3074F SYST BP LT 130 MM HG: CPT | Performed by: FAMILY MEDICINE

## 2025-08-19 PROCEDURE — 1123F ACP DISCUSS/DSCN MKR DOCD: CPT | Performed by: FAMILY MEDICINE

## 2025-08-19 PROCEDURE — 99214 OFFICE O/P EST MOD 30 MIN: CPT | Performed by: FAMILY MEDICINE

## 2025-08-19 PROCEDURE — G0439 PPPS, SUBSEQ VISIT: HCPCS | Performed by: FAMILY MEDICINE

## 2025-08-19 PROCEDURE — 3044F HG A1C LEVEL LT 7.0%: CPT | Performed by: FAMILY MEDICINE

## 2025-08-19 PROCEDURE — 99001 SPECIMEN HANDLING PT-LAB: CPT

## 2025-08-19 PROCEDURE — 3078F DIAST BP <80 MM HG: CPT | Performed by: FAMILY MEDICINE

## 2025-08-19 RX ORDER — SERTRALINE HYDROCHLORIDE 100 MG/1
150 TABLET, FILM COATED ORAL DAILY
COMMUNITY

## 2025-08-19 ASSESSMENT — LIFESTYLE VARIABLES
HOW OFTEN DO YOU HAVE A DRINK CONTAINING ALCOHOL: NEVER
HOW MANY STANDARD DRINKS CONTAINING ALCOHOL DO YOU HAVE ON A TYPICAL DAY: PATIENT DOES NOT DRINK

## 2025-08-19 ASSESSMENT — PATIENT HEALTH QUESTIONNAIRE - PHQ9
7. TROUBLE CONCENTRATING ON THINGS, SUCH AS READING THE NEWSPAPER OR WATCHING TELEVISION: SEVERAL DAYS
3. TROUBLE FALLING OR STAYING ASLEEP: NOT AT ALL
10. IF YOU CHECKED OFF ANY PROBLEMS, HOW DIFFICULT HAVE THESE PROBLEMS MADE IT FOR YOU TO DO YOUR WORK, TAKE CARE OF THINGS AT HOME, OR GET ALONG WITH OTHER PEOPLE: SOMEWHAT DIFFICULT
SUM OF ALL RESPONSES TO PHQ QUESTIONS 1-9: 4
1. LITTLE INTEREST OR PLEASURE IN DOING THINGS: SEVERAL DAYS
4. FEELING TIRED OR HAVING LITTLE ENERGY: MORE THAN HALF THE DAYS
SUM OF ALL RESPONSES TO PHQ QUESTIONS 1-9: 4
5. POOR APPETITE OR OVEREATING: NOT AT ALL
SUM OF ALL RESPONSES TO PHQ QUESTIONS 1-9: 4
2. FEELING DOWN, DEPRESSED OR HOPELESS: NOT AT ALL
8. MOVING OR SPEAKING SO SLOWLY THAT OTHER PEOPLE COULD HAVE NOTICED. OR THE OPPOSITE, BEING SO FIGETY OR RESTLESS THAT YOU HAVE BEEN MOVING AROUND A LOT MORE THAN USUAL: NOT AT ALL
SUM OF ALL RESPONSES TO PHQ QUESTIONS 1-9: 4
9. THOUGHTS THAT YOU WOULD BE BETTER OFF DEAD, OR OF HURTING YOURSELF: NOT AT ALL
6. FEELING BAD ABOUT YOURSELF - OR THAT YOU ARE A FAILURE OR HAVE LET YOURSELF OR YOUR FAMILY DOWN: NOT AT ALL

## 2025-08-19 ASSESSMENT — ENCOUNTER SYMPTOMS: SHORTNESS OF BREATH: 0

## 2025-08-20 LAB
A/G RATIO: 1.5 RATIO (ref 1.1–2.6)
ALBUMIN: 4.1 G/DL (ref 3.5–5)
ALP BLD-CCNC: 68 U/L (ref 40–125)
ALT SERPL-CCNC: 91 U/L (ref 5–40)
ANION GAP SERPL CALCULATED.3IONS-SCNC: 11 MMOL/L (ref 3–15)
AST SERPL-CCNC: 46 U/L (ref 10–37)
BILIRUB SERPL-MCNC: 0.4 MG/DL (ref 0.2–1.2)
BUN BLDV-MCNC: 23 MG/DL (ref 6–22)
CALCIUM SERPL-MCNC: 9.6 MG/DL (ref 8.4–10.5)
CHLORIDE BLD-SCNC: 102 MMOL/L (ref 98–110)
CHOLESTEROL, TOTAL: 113 MG/DL (ref 110–200)
CHOLESTEROL/HDL RATIO: 2.8 (ref 0–5)
CO2: 28 MMOL/L (ref 20–32)
CREAT SERPL-MCNC: 1.4 MG/DL (ref 0.8–1.6)
CREATININE, URINE  MG/DL: 172 MG/DL
GFR, ESTIMATED: 50 ML/MIN/1.73 SQ.M.
GLOBULIN: 2.7 G/DL (ref 2–4)
GLUCOSE: 109 MG/DL (ref 70–99)
HDLC SERPL-MCNC: 41 MG/DL
LDL CHOLESTEROL: 55 MG/DL (ref 50–99)
LDL/HDL RATIO: 1.4
MICROALBUMIN/CREAT 24H UR: 29.6 MG/L (ref 0.1–17)
MICROALBUMIN/CREAT UR-RTO: 17.2 (ref 0–30)
NON-HDL CHOLESTEROL: 72 MG/DL
POTASSIUM SERPL-SCNC: 4.1 MMOL/L (ref 3.5–5.5)
SENTARA SPECIMEN COLLECTION: NORMAL
SODIUM BLD-SCNC: 141 MMOL/L (ref 133–145)
TOTAL PROTEIN: 6.8 G/DL (ref 6.2–8.1)
TRIGL SERPL-MCNC: 82 MG/DL (ref 40–149)
TSH SERPL DL<=0.05 MIU/L-ACNC: 1.66 MCU/ML (ref 0.27–4.2)
VLDLC SERPL CALC-MCNC: 16 MG/DL (ref 8–30)